# Patient Record
Sex: MALE | Race: WHITE | Employment: OTHER | URBAN - METROPOLITAN AREA
[De-identification: names, ages, dates, MRNs, and addresses within clinical notes are randomized per-mention and may not be internally consistent; named-entity substitution may affect disease eponyms.]

---

## 2018-05-08 ENCOUNTER — HOSPITAL ENCOUNTER (OUTPATIENT)
Dept: CT IMAGING | Age: 68
Discharge: HOME | End: 2018-05-08
Attending: INTERNAL MEDICINE
Payer: MEDICARE

## 2018-05-08 DIAGNOSIS — R04.2 HEMOPTYSIS: ICD-10-CM

## 2018-05-08 PROCEDURE — 74011000258 HC RX REV CODE- 258: Performed by: INTERNAL MEDICINE

## 2018-05-08 PROCEDURE — 74011636320 HC RX REV CODE- 636/320: Performed by: INTERNAL MEDICINE

## 2018-05-08 PROCEDURE — 71260 CT THORAX DX C+: CPT

## 2018-05-08 PROCEDURE — 82565 ASSAY OF CREATININE: CPT

## 2018-05-08 RX ORDER — SODIUM CHLORIDE 0.9 % (FLUSH) 0.9 %
10 SYRINGE (ML) INJECTION
Status: COMPLETED | OUTPATIENT
Start: 2018-05-08 | End: 2018-05-08

## 2018-05-08 RX ADMIN — Medication 10 ML: at 11:08

## 2018-05-08 RX ADMIN — IOPAMIDOL 100 ML: 612 INJECTION, SOLUTION INTRAVENOUS at 11:08

## 2018-05-08 RX ADMIN — SODIUM CHLORIDE 100 ML: 900 INJECTION, SOLUTION INTRAVENOUS at 11:08

## 2018-05-09 LAB — CREAT BLD-MCNC: 1.2 MG/DL (ref 0.6–1.3)

## 2018-09-20 ENCOUNTER — ANESTHESIA EVENT (OUTPATIENT)
Dept: ENDOSCOPY | Age: 68
End: 2018-09-20
Payer: MEDICARE

## 2018-09-20 RX ORDER — PRAVASTATIN SODIUM 40 MG/1
40 TABLET ORAL DAILY
COMMUNITY

## 2018-09-20 RX ORDER — ASPIRIN 81 MG/1
81 TABLET ORAL DAILY
COMMUNITY

## 2018-09-20 RX ORDER — CARVEDILOL 6.25 MG/1
6.25 TABLET ORAL 2 TIMES DAILY
Status: ON HOLD | COMMUNITY
End: 2021-10-09 | Stop reason: SDUPTHER

## 2018-09-20 RX ORDER — CLOPIDOGREL BISULFATE 75 MG/1
75 TABLET ORAL DAILY
COMMUNITY

## 2018-09-20 RX ORDER — FUROSEMIDE 80 MG/1
80 TABLET ORAL DAILY
Status: ON HOLD | COMMUNITY
End: 2021-08-24 | Stop reason: SDUPTHER

## 2018-09-20 RX ORDER — GABAPENTIN 300 MG/1
600 CAPSULE ORAL 2 TIMES DAILY
COMMUNITY

## 2018-09-20 RX ORDER — FLUTICASONE PROPIONATE AND SALMETEROL 250; 50 UG/1; UG/1
1 POWDER RESPIRATORY (INHALATION) 2 TIMES DAILY
COMMUNITY

## 2018-09-20 NOTE — ANESTHESIA PREPROCEDURE EVALUATION
Anesthetic History No history of anesthetic complications Review of Systems / Medical History Patient summary reviewed, nursing notes reviewed and pertinent labs reviewed Pulmonary COPD: mild Sleep apnea: CPAP Neuro/Psych Within defined limits Cardiovascular Hypertension: well controlled CHF Dysrhythmias : atrial fibrillation CAD 
 
 
  
GI/Hepatic/Renal 
  
GERD Endo/Other Obesity and cancer Other Findings Physical Exam 
 
Airway Mallampati: III 
TM Distance: > 6 cm Neck ROM: normal range of motion Mouth opening: Normal 
 
 Cardiovascular Regular rate and rhythm,  S1 and S2 normal,  no murmur, click, rub, or gallop Dental 
 
Dentition: Poor dentition Pulmonary Breath sounds clear to auscultation Abdominal 
GI exam deferred Other Findings Anesthetic Plan ASA: 3 Anesthesia type: MAC Induction: Intravenous Anesthetic plan and risks discussed with: Patient

## 2018-09-21 ENCOUNTER — APPOINTMENT (OUTPATIENT)
Dept: GENERAL RADIOLOGY | Age: 68
End: 2018-09-21
Attending: COLON & RECTAL SURGERY
Payer: MEDICARE

## 2018-09-21 ENCOUNTER — ANESTHESIA (OUTPATIENT)
Dept: ENDOSCOPY | Age: 68
End: 2018-09-21
Payer: MEDICARE

## 2018-09-21 ENCOUNTER — HOSPITAL ENCOUNTER (OUTPATIENT)
Age: 68
Setting detail: OUTPATIENT SURGERY
Discharge: HOME | End: 2018-09-21
Attending: COLON & RECTAL SURGERY | Admitting: COLON & RECTAL SURGERY
Payer: MEDICARE

## 2018-09-21 VITALS
RESPIRATION RATE: 15 BRPM | WEIGHT: 216 LBS | TEMPERATURE: 97.8 F | BODY MASS INDEX: 31.9 KG/M2 | HEART RATE: 70 BPM | DIASTOLIC BLOOD PRESSURE: 80 MMHG | SYSTOLIC BLOOD PRESSURE: 139 MMHG | OXYGEN SATURATION: 98 %

## 2018-09-21 PROCEDURE — 74270 X-RAY XM COLON 1CNTRST STD: CPT

## 2018-09-21 PROCEDURE — 76040000019: Performed by: COLON & RECTAL SURGERY

## 2018-09-21 PROCEDURE — 76060000031 HC ANESTHESIA FIRST 0.5 HR: Performed by: COLON & RECTAL SURGERY

## 2018-09-21 PROCEDURE — 77030027957 HC TBNG IRR ENDOGTR BUSS -B: Performed by: COLON & RECTAL SURGERY

## 2018-09-21 PROCEDURE — 74011636320 HC RX REV CODE- 636/320: Performed by: RADIOLOGY

## 2018-09-21 RX ORDER — SODIUM CHLORIDE 9 MG/ML
INJECTION, SOLUTION INTRAVENOUS
Status: DISCONTINUED | OUTPATIENT
Start: 2018-09-21 | End: 2018-09-21 | Stop reason: HOSPADM

## 2018-09-21 RX ORDER — SODIUM CHLORIDE 0.9 % (FLUSH) 0.9 %
5-10 SYRINGE (ML) INJECTION EVERY 8 HOURS
Status: DISCONTINUED | OUTPATIENT
Start: 2018-09-21 | End: 2018-09-21 | Stop reason: HOSPADM

## 2018-09-21 RX ORDER — NALOXONE HYDROCHLORIDE 0.4 MG/ML
0.4 INJECTION, SOLUTION INTRAMUSCULAR; INTRAVENOUS; SUBCUTANEOUS
Status: DISCONTINUED | OUTPATIENT
Start: 2018-09-21 | End: 2018-09-21 | Stop reason: HOSPADM

## 2018-09-21 RX ORDER — ATROPINE SULFATE 0.1 MG/ML
0.5 INJECTION INTRAVENOUS
Status: DISCONTINUED | OUTPATIENT
Start: 2018-09-21 | End: 2018-09-21 | Stop reason: HOSPADM

## 2018-09-21 RX ORDER — FLUMAZENIL 0.1 MG/ML
0.2 INJECTION INTRAVENOUS
Status: DISCONTINUED | OUTPATIENT
Start: 2018-09-21 | End: 2018-09-21 | Stop reason: HOSPADM

## 2018-09-21 RX ORDER — PROPOFOL 10 MG/ML
INJECTION, EMULSION INTRAVENOUS AS NEEDED
Status: DISCONTINUED | OUTPATIENT
Start: 2018-09-21 | End: 2018-09-21 | Stop reason: HOSPADM

## 2018-09-21 RX ORDER — SODIUM CHLORIDE 0.9 % (FLUSH) 0.9 %
5-10 SYRINGE (ML) INJECTION AS NEEDED
Status: DISCONTINUED | OUTPATIENT
Start: 2018-09-21 | End: 2018-09-21 | Stop reason: HOSPADM

## 2018-09-21 RX ORDER — EPINEPHRINE 0.1 MG/ML
1 INJECTION INTRACARDIAC; INTRAVENOUS
Status: DISCONTINUED | OUTPATIENT
Start: 2018-09-21 | End: 2018-09-21 | Stop reason: HOSPADM

## 2018-09-21 RX ORDER — LIDOCAINE HYDROCHLORIDE 20 MG/ML
INJECTION, SOLUTION EPIDURAL; INFILTRATION; INTRACAUDAL; PERINEURAL AS NEEDED
Status: DISCONTINUED | OUTPATIENT
Start: 2018-09-21 | End: 2018-09-21 | Stop reason: HOSPADM

## 2018-09-21 RX ORDER — DEXTROMETHORPHAN/PSEUDOEPHED 2.5-7.5/.8
1.2 DROPS ORAL
Status: DISCONTINUED | OUTPATIENT
Start: 2018-09-21 | End: 2018-09-21 | Stop reason: HOSPADM

## 2018-09-21 RX ORDER — SODIUM CHLORIDE 9 MG/ML
50 INJECTION, SOLUTION INTRAVENOUS CONTINUOUS
Status: DISCONTINUED | OUTPATIENT
Start: 2018-09-21 | End: 2018-09-21 | Stop reason: HOSPADM

## 2018-09-21 RX ADMIN — PROPOFOL 50 MG: 10 INJECTION, EMULSION INTRAVENOUS at 11:20

## 2018-09-21 RX ADMIN — LIDOCAINE HYDROCHLORIDE 40 MG: 20 INJECTION, SOLUTION EPIDURAL; INFILTRATION; INTRACAUDAL; PERINEURAL at 11:18

## 2018-09-21 RX ADMIN — PROPOFOL 50 MG: 10 INJECTION, EMULSION INTRAVENOUS at 11:18

## 2018-09-21 RX ADMIN — DIATRIZOATE MEGLUMINE AND DIATRIZOATE SODIUM 240 ML: 660; 100 LIQUID ORAL; RECTAL at 13:32

## 2018-09-21 RX ADMIN — SODIUM CHLORIDE: 9 INJECTION, SOLUTION INTRAVENOUS at 10:45

## 2018-09-21 RX ADMIN — PROPOFOL 50 MG: 10 INJECTION, EMULSION INTRAVENOUS at 11:19

## 2018-09-21 RX ADMIN — IOTHALAMATE MEGLUMINE 500 ML: 172 INJECTION URETERAL at 13:31

## 2018-09-21 RX ADMIN — PROPOFOL 50 MG: 10 INJECTION, EMULSION INTRAVENOUS at 11:22

## 2018-09-21 RX ADMIN — SODIUM CHLORIDE: 9 INJECTION, SOLUTION INTRAVENOUS at 11:22

## 2018-09-21 NOTE — BRIEF OP NOTE
BRIEF OPERATIVE NOTE    Date of Procedure: 9/21/2018   Preoperative Diagnosis: HISTORY OF POLYPS, ANAL SQUAMOUS CELL CARCINOMA  Postoperative Diagnosis: 1. incomplete, barium enema required    Procedure(s):  COLONOSCOPY  Surgeon(s) and Role:     * Paulo Hernandez MD - Primary         Surgical Assistant:     Surgical Staff:  Endoscopy Technician-1: Juan M Tomlinson  Endoscopy RN-1: Billie Madison RN  No case tracking events are documented in the log.   Anesthesia: MAC   Estimated Blood Loss: none  Specimens: * No specimens in log *   Findings: sigmoid stricture likely due to angulation/adhesions/scarring   Complications: none apparent  Implants: * No implants in log *

## 2018-09-21 NOTE — DISCHARGE INSTRUCTIONS
Kyler Buckner  [id number]  1950    COLON DISCHARGE INSTRUCTIONS  Discomfort:  Redness at IV site- apply warm compress to area; if redness or soreness persist- contact your physician  There may be a slight amount of blood passed from the rectum  Gaseous discomfort- walking, belching will help relieve any discomfort  You may not operate a vehicle for 12 hours  You may not engage in an occupation involving machinery or appliances for rest of today  You may not drink alcoholic beverages for at least 12 hours  Avoid making any critical decisions for at least 24 hour  DIET:   High fiber diet. - however -  remember your colon is empty and a heavy meal will produce gas. Avoid these foods:  vegetables, fried / greasy foods, carbonated drinks for today    MEDICATIONS:  resume       ACTIVITY:  You may resume your normal daily activities it is recommended that you spend the remainder of the day resting -  avoid any strenuous activity. CALL M.D. ANY SIGN OF:   Increasing pain, nausea, vomiting  Abdominal distension (swelling)  New increased bleeding (oral or rectal)  Fever (chills)  Pain in chest area  Bloody discharge from nose or mouth  Shortness of breath     Follow-up Instructions:   Call Ina Jimenez MD if any questions or problems. Telephone # [phone]  Biopsy results will be available in  7 to10 days  Should have a repeat colonoscopy in 5 years. COLONOSCOPY FINDINGS:  Your colonoscopy showed: sigmoid stricture making colonoscopy difficult.

## 2018-09-21 NOTE — PROGRESS NOTES

## 2018-09-21 NOTE — OP NOTES
Colonoscopy Procedure Note    Indications: Previous adenomatous polyp. Hx anal scc    Anesthesia/Sedation: MAC anesthesia Propofol    Pre-Procedure Exam:  Airway: clear   Heart: normal S1and S2    Lungs: clear bilateral  Abdomen: soft, nontender, bowel sounds present and normal in all quadrants   Mental Status: awake, alert, and oriented to person, place, and time      Procedure in Detail:  Informed consent was obtained for the procedure, including sedation. Risks of perforation, hemorrhage, adverse drug reaction, and aspiration were discussed. The patient was placed in the left lateral decubitus position. Based on the pre-procedure assessment, including review of the patient's medical history, medications, allergies, and review of systems, he had been deemed to be an appropriate candidate for moderate sedation; he was therefore sedated with the medications listed above. The patient was monitored continuously with ECG tracing, pulse oximetry, blood pressure monitoring, and direct observations. A rectal examination was performed. The [id number] was inserted into the rectum and advanced under direct vision to the sigmoid colon. The quality of the colonic preparation was excellent. A careful inspection was made as the colonoscope was withdrawn, including a retroflexed view of the rectum; findings and interventions are described below. Appropriate photodocumentation was obtained. Findings:   Rectum: Normal  Sigmoid:   - Excavated lesions:     - Diverticulosis   Stricture/scarring prevented passage of scope    Specimens: No specimens were collected. EBL: None    Complications: None; patient tolerated the procedure well. Attending Attestation: I performed the procedure. Recommendations:   - Repeat colonoscopy in 5 years.    Barium enema today    Signed By: Sunny Peralta MD                        September 21, 2018

## 2018-09-21 NOTE — IP AVS SNAPSHOT
[address] 
[phone] Patient: Yokasta Hou MRN: [id number] :1950 About your hospitalization You were admitted on:  2018 You last received care in the:  [facility] ENDOSCOPY You were discharged on:  2018 Why you were hospitalized Your primary diagnosis was:  Not on File Follow-up Information Follow up With Details Comments Contact Info Letha Boswell MD   [address] [place] [address] [address] [phone] Discharge Orders None A check alex indicates which time of day the medication should be taken. My Medications CONTINUE taking these medications Instructions Each Dose to Equal  
 Morning Noon Evening Bedtime ADVAIR DISKUS 250-50 mcg/dose diskus inhaler Generic drug:  fluticasone-salmeterol Your last dose was: Your next dose is: Take 1 Puff by inhalation two (2) times a day. 1 Puff ASPIRIN PO Your last dose was: Your next dose is: Take 81 mg by mouth daily. 81 mg  
    
   
   
   
  
 carvedilol 6.25 mg tablet Commonly known as:  Laura Olmosas Your last dose was: Your next dose is: Take 6.25 mg by mouth two (2) times a day. 6.25 mg  
    
   
   
   
  
 digoxin 0.125 mg tablet Commonly known as:  LANOXIN Your last dose was: Your next dose is: Take 0.125 mg by mouth daily. 0.125 mg  
    
   
   
   
  
 folic acid 1 mg tablet Commonly known as:  Robles Sender Your last dose was: Your next dose is: Take 1 Tab by mouth daily. 1 mg  
    
   
   
   
  
 furosemide 80 mg tablet Commonly known as:  LASIX Your last dose was: Your next dose is: Take 80 mg by mouth daily. 80 mg  
    
   
   
   
  
 gabapentin 300 mg capsule Commonly known as:  NEURONTIN Your last dose was: Your next dose is: Take 600 mg by mouth two (2) times a day. 600 mg PLAVIX 75 mg Tab Generic drug:  clopidogrel Your last dose was: Your next dose is: Take 75 mg by mouth daily. 75 mg  
    
   
   
   
  
 potassium chloride SR 10 mEq tablet Commonly known as:  KLOR-CON 10 Your last dose was: Your next dose is: Take 2 Tabs by mouth two (2) times a day. 20 mEq  
    
   
   
   
  
 pravastatin 40 mg tablet Commonly known as:  PRAVACHOL Your last dose was: Your next dose is: Take 40 mg by mouth daily. 40 mg  
    
   
   
   
  
 SPIRIVA WITH HANDIHALER 18 mcg inhalation capsule Generic drug:  tiotropium Your last dose was: Your next dose is: Take 1 Cap by inhalation daily. 1 Cap Discharge Instructions Francisco Javier Sharma [id number] 
1950 COLON DISCHARGE INSTRUCTIONS Discomfort: 
Redness at IV site- apply warm compress to area; if redness or soreness persist- contact your physician There may be a slight amount of blood passed from the rectum Gaseous discomfort- walking, belching will help relieve any discomfort You may not operate a vehicle for 12 hours You may not engage in an occupation involving machinery or appliances for rest of today You may not drink alcoholic beverages for at least 12 hours Avoid making any critical decisions for at least 24 hour DIET: 
 High fiber diet.  however -  remember your colon is empty and a heavy meal will produce gas. Avoid these foods:  vegetables, fried / greasy foods, carbonated drinks for today MEDICATIONS: 
resume ACTIVITY: 
You may resume your normal daily activities it is recommended that you spend the remainder of the day resting -  avoid any strenuous activity. CALL M.D. ANY SIGN OF: Increasing pain, nausea, vomiting Abdominal distension (swelling) New increased bleeding (oral or rectal) Fever (chills) Pain in chest area Bloody discharge from nose or mouth Shortness of breath Follow-up Instructions: 
 Call Naa Fink MD if any questions or problems. Telephone # [phone] Biopsy results will be available in  7 to10 days Should have a repeat colonoscopy in 5 years. COLONOSCOPY FINDINGS: 
Your colonoscopy showed: sigmoid stricture making colonoscopy difficult. Introducing [facility]! Cindy Tsang introduces [employer] patient portal. Now you can access parts of your medical record, email your doctor's office, and request medication refills online. 1. In your internet browser, go to [email or web address] 2. Click on the First Time User? Click Here link in the Sign In box. You will see the New Member Sign Up page. 3. Enter your [employer] Access Code exactly as it appears below. You will not need to use this code after youve completed the sign-up process. If you do not sign up before the expiration date, you must request a new code. · [employer] Access Code: [email or web address] Expires: 11/4/2018  1:48 PM 
 
4. Enter the last four digits of your Social Security Number (xxxx) and Date of Birth (mm/dd/yyyy) as indicated and click Submit. You will be taken to the next sign-up page. 5. Create a [employer] ID. This will be your [employer] login ID and cannot be changed, so think of one that is secure and easy to remember. 6. Create a [employer] password. You can change your password at any time. 7. Enter your Password Reset Question and Answer. This can be used at a later time if you forget your password. 8. Enter your e-mail address. You will receive e-mail notification when new information is available in [address] 9. Click Sign Up. You can now view and download portions of your medical record. 10. Click the Download Summary menu link to download a portable copy of your medical information. If you have questions, please visit the Frequently Asked Questions section of the [email or web address] website. Remember, [employer] is NOT to be used for urgent needs. For medical emergencies, dial 911. Now available from your iPhone and Android! Introducing Angel Rodriguez As a [facility] patient, I wanted to make you aware of our electronic visit tool called Angel Rodriguez. [employer] allows you to connect within minutes with a medical provider 24 hours a day, seven days a week via a mobile device or tablet or logging into a secure website from your computer. You can access Angel Rodriguez from anywhere in the [place]. A virtual visit might be right for you when you have a simple condition and feel like you just dont want to get out of bed, or cant get away from work for an appointment, when your regular [facility] provider is not available (evenings, weekends or holidays), or when youre out of town and need minor care. Electronic visits cost only $49 and if the [employer]Life[employer] provider determines a prescription is needed to treat your condition, one can be electronically transmitted to a nearby pharmacy*. Please take a moment to enroll today if you have not already done so. The enrollment process is free and takes just a few minutes. To enroll, please download the Bimb[employer] reza to your tablet or phone, or visit [email or web address] to enroll on your computer. And, as an [address] [place] [address] patient with a [employer] account, the results of your visits will be scanned into your electronic medical record and your primary care provider will be able to view the scanned results. We urge you to continue to see your regular [facility] provider for your ongoing medical care.   And while your primary care provider may not be the one available when you seek a Angel Rodriguez virtual visit, the peace of mind you get from getting a real diagnosis real time can be priceless. For more information on Angel Rodriguez, view our Frequently Asked Questions (FAQs) at [email or web address]. Sincerely, 
 
Cass Thomas MD 
Chief Medical Officer Odette Luz *:  certain medications cannot be prescribed via Angel Rodriguez Providers Seen During Your Hospitalization Provider Specialty Primary office phone Socorro Alvarado MD Colon and Rectal Surgery [phone] Your Primary Care Physician (PCP) Primary Care Physician Office Phone Office Fax Nain Bojorquez, [address] [phone] You are allergic to the following No active allergies Recent Documentation Weight BMI Smoking Status 98 kg 31.9 kg/m2 Former Smoker Emergency Contacts Name Discharge Info Relation Home Work Mobile Louann Smith DISCHARGE CAREGIVER [3] Mother [14] [phone] AdamJanice louie  Child [2] [phone] Patient Belongings The following personal items are in your possession at time of discharge: 
  Dental Appliances: None  Visual Aid: None Please provide this summary of care documentation to your next provider. Signatures-by signing, you are acknowledging that this After Visit Summary has been reviewed with you and you have received a copy. Patient Signature:  ____________________________________________________________ Date:  ____________________________________________________________  
  
Vero Fenton Provider Signature:  ____________________________________________________________ Date:  ____________________________________________________________

## 2018-09-21 NOTE — H&P
Colon and Rectal Surgery History and Physical    Subjective:      Chely Sagastume is a 75 y.o. male who has hx anal scc and hx colon jm    Patient Active Problem List    Diagnosis Date Noted    PAD (peripheral artery disease) ([facility] 10/29/2014    Acute on chronic systolic heart failure ([address] 01/06/2014    Atrial flutter ([address] 10/30/2010    COPD (chronic obstructive pulmonary disease) ([address] 10/30/2010    Dilated cardiomyopathy ([address] 10/30/2010    ETOH abuse 10/30/2010     Past Medical History:   Diagnosis Date    Atrial fibrillation ([address]     CAD (coronary artery disease)     Cancer ([facility] [address]     Colon CA 2014 - polyp removed/chemo/radiation    Chronic obstructive pulmonary disease (HCC)     GERD (gastroesophageal reflux disease)     Heart failure (HCC)     Hypertension     Sleep apnea     uses CPAP      Past Surgical History:   Procedure Laterality Date    HX AFIB ABLATION      HX GI      cancerous polyps removed  - pt usnure if done in OR or during colonoscopy    HX GI      colonoscopy x 2 - polyps both times    HX ORTHOPAEDIC      killed nerves in feet    VASCULAR SURGERY PROCEDURE UNLIST      stents legs bilaterally 2014/2015      Social History   Substance Use Topics    Smoking status: Former Smoker     Quit date: 9/28/2012    Smokeless tobacco: Never Used    Alcohol use No      Family History   Problem Relation Age of Onset    Alcohol abuse Father     Cancer Father      lung    Cancer Brother      throat    Stroke Brother       Prior to Admission medications    Medication Sig Start Date End Date Taking? Authorizing Provider   tiotropium (SPIRIVA WITH HANDIHALER) 18 mcg inhalation capsule Take 1 Cap by inhalation daily. Yes Historical Provider   fluticasone-salmeterol (ADVAIR DISKUS) 250-50 mcg/dose diskus inhaler Take 1 Puff by inhalation two (2) times a day. Yes Historical Provider   gabapentin (NEURONTIN) 300 mg capsule Take 600 mg by mouth two (2) times a day.    Yes Historical Provider ASPIRIN PO Take 81 mg by mouth daily. Yes Historical Provider   pravastatin (PRAVACHOL) 40 mg tablet Take 40 mg by mouth daily. Yes Historical Provider   furosemide (LASIX) 80 mg tablet Take 80 mg by mouth daily. Yes Historical Provider   carvedilol (COREG) 6.25 mg tablet Take 6.25 mg by mouth two (2) times a day. Yes Historical Provider   digoxin (LANOXIN) 0.125 mg tablet Take 0.125 mg by mouth daily. Yes Historical Provider   folic acid (FOLVITE) 1 mg tablet Take 1 Tab by mouth daily. 1/24/14  Yes Blayne Padilla MD   potassium chloride SR (KLOR-CON 10) 10 mEq tablet Take 2 Tabs by mouth two (2) times a day. 1/24/14  Yes Blayne Padilla MD   clopidogrel (PLAVIX) 75 mg tab Take 75 mg by mouth daily. Historical Provider     No Known Allergies     Review of Systems:    A comprehensive review of systems was negative except for that written in the History of Present Illness. Objective:     Visit Vitals    /54    Pulse 66    Temp 97.7 °F (36.5 °C)    Resp (!) 7    Wt 98 kg (216 lb)    SpO2 94%    BMI 31.9 kg/m2        Physical Exam:   nad  Chest clear  Heart reg  abd soft    Imaging:  images and reports reviewed    Lab Review:  No results found for this or any previous visit (from the past 24 hour(s)). Labs and radiology: images and reports reviewed      Assessment:   Anal scc and hx jm    Plan:     1. I recommend proceeding with colonoscopy. Treatment alternatives were discussed. 2. Discussed aspects of surgical intervention, methods, risks (including by not limited to infection, bleeding, hematoma, and perforation of the intestines or solid organs), and the risks of general anesthetic. The patient understands the risks; any and all questions were answered to the patient's satisfaction.     Signed By: Calvin Camargo MD     September 21, 2018

## 2018-09-21 NOTE — PROGRESS NOTES
Ana Goode  1950  [id number]    Situation:  Verbal report received from: Etiennejulianne Sanchez  Procedure: Procedure(s):  COLONOSCOPY    Background:    Preoperative diagnosis: HISTORY OF POLYPS, ANAL SQUAMOUS CELL CARCINOMA  Postoperative diagnosis: 1. incomplete, barium enema required    :  Dr. Shahram Salmeron  Assistant(s): Endoscopy Technician-1: Maria Alejandra Molina  Endoscopy RN-1: Tricia Dugan RN    Specimens: * No specimens in log *  H. Pylori  no    Assessment:  Intra-procedure medications     Anesthesia gave intra-procedure sedation and medications, see anesthesia flow sheet yes    Intravenous fluids: NS@ KVO     Vital signs stable     Abdominal assessment: round and soft     Recommendation:  Discharge patient per MD order  . Return to floor  Family or Friend   MD ordered [facility] radiology notified. Will pick patient up and transport to unit. Vital signs remain stable denies pain awaiting . Procedure explained to patient.

## 2019-03-01 ENCOUNTER — HOSPITAL ENCOUNTER (OUTPATIENT)
Dept: CARDIAC REHAB | Age: 69
End: 2019-03-01

## 2019-03-28 ENCOUNTER — HOSPITAL ENCOUNTER (OUTPATIENT)
Dept: CARDIAC REHAB | Age: 69
Discharge: HOME | End: 2019-03-28
Payer: MEDICARE

## 2019-03-28 VITALS — HEIGHT: 69 IN | WEIGHT: 232.4 LBS | BODY MASS INDEX: 34.42 KG/M2

## 2019-03-28 PROCEDURE — G0424 PULMONARY REHAB W EXER: HCPCS

## 2019-03-28 NOTE — CARDIO/PULMONARY
Cardiopulmonary Rehab Intake Note: 
Met with Mr. Jonna Godwin for initial pulmonary visit. Mr. Marsha Neville is a 70year old  patient of Dr. James Zapata who presents with COPD. History of A-fib/Flutter, cardiomyopathy, heart failure, PAD, alcohol abuse and rectal cancer. [id number]% 
  
Limitations to exercise are primarily related to deconditioning  
   
He currently is not exercising at home.   
   
He competed the 6 minute walk test on the track, walking 243 meters, mets 2.2, RPE 12 and RPD 3.  He did have some shortness of breath during his 6 min walk test but did not require oxygen. 
   
Psychosocial: Pt scored 1 on his PHQ9. He is retired and has two children that live near him. 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 He used to sing and play guitar but can not sing due to his breathing. He also can not play guitar due to arthritis in his hands. 
   
Patient was given an overview of cardiac rehab program, placement of electrode/leads, and rationale for program. 
   
Heart rhythm on the monitor was A fib. Oxygen saturation was 97% on room air. BMI 34.3 Patient's identified goals are 1. To lose 15 lbs 2. To gain strength in his legs.

## 2019-03-28 NOTE — CARDIO/PULMONARY
COPD Assessment Tool (CAT)     0-5 I never cough/I cough all the time       Score:2 I have no phlegm in my chest/ My chest is completely full of phlegm  Score:2 My chest does not feel tight at all/ My chest feels very tight   Score:0 When I walk up a hill or stairs I am bot breathless/ When I walk up a hill or stairs I am very breathless    Score3: 
 
 
I am not limited doing any activities at home/ 
 I am very limited doing activities at home     Score:1 I am confident leaving my home despite my lung condition/ 
I am not at all confident leaving my home because of my lung condition Score:0 I sleep soundly/ I don't sleep because of my lung condition   Score:1 I have lots of energy/ I have no energy at all     Score:3            Total:12

## 2019-04-02 ENCOUNTER — HOSPITAL ENCOUNTER (OUTPATIENT)
Dept: CARDIAC REHAB | Age: 69
Discharge: HOME | End: 2019-04-02
Payer: MEDICARE

## 2019-04-02 VITALS — WEIGHT: 231 LBS | BODY MASS INDEX: 34.11 KG/M2

## 2019-04-02 PROCEDURE — 93798 PHYS/QHP OP CAR RHAB W/ECG: CPT

## 2019-04-02 PROCEDURE — G0424 PULMONARY REHAB W EXER: HCPCS

## 2019-04-04 ENCOUNTER — HOSPITAL ENCOUNTER (OUTPATIENT)
Dept: CARDIAC REHAB | Age: 69
Discharge: HOME | End: 2019-04-04
Payer: MEDICARE

## 2019-04-04 VITALS — BODY MASS INDEX: 34.41 KG/M2 | WEIGHT: 233 LBS

## 2019-04-04 PROCEDURE — G0424 PULMONARY REHAB W EXER: HCPCS

## 2019-04-09 ENCOUNTER — HOSPITAL ENCOUNTER (OUTPATIENT)
Dept: CARDIAC REHAB | Age: 69
Discharge: HOME | End: 2019-04-09
Payer: MEDICARE

## 2019-04-09 VITALS — BODY MASS INDEX: 34.26 KG/M2 | WEIGHT: 232 LBS

## 2019-04-09 PROCEDURE — G0424 PULMONARY REHAB W EXER: HCPCS

## 2019-04-11 ENCOUNTER — HOSPITAL ENCOUNTER (OUTPATIENT)
Dept: CARDIAC REHAB | Age: 69
Discharge: HOME | End: 2019-04-11
Payer: MEDICARE

## 2019-04-11 VITALS — BODY MASS INDEX: 34.41 KG/M2 | WEIGHT: 233 LBS

## 2019-04-11 PROCEDURE — G0424 PULMONARY REHAB W EXER: HCPCS

## 2019-04-23 ENCOUNTER — HOSPITAL ENCOUNTER (OUTPATIENT)
Dept: CARDIAC REHAB | Age: 69
Discharge: HOME | End: 2019-04-23
Payer: MEDICARE

## 2019-04-23 VITALS — WEIGHT: 224 LBS | BODY MASS INDEX: 33.08 KG/M2

## 2019-04-23 PROCEDURE — G0424 PULMONARY REHAB W EXER: HCPCS

## 2019-04-25 ENCOUNTER — HOSPITAL ENCOUNTER (OUTPATIENT)
Dept: CARDIAC REHAB | Age: 69
Discharge: HOME | End: 2019-04-25
Payer: MEDICARE

## 2019-04-25 VITALS — BODY MASS INDEX: 33.52 KG/M2 | WEIGHT: 227 LBS

## 2019-04-25 PROCEDURE — G0424 PULMONARY REHAB W EXER: HCPCS

## 2019-05-02 ENCOUNTER — HOSPITAL ENCOUNTER (OUTPATIENT)
Dept: CARDIAC REHAB | Age: 69
Discharge: HOME | End: 2019-05-02
Payer: MEDICARE

## 2019-05-02 VITALS — WEIGHT: 224 LBS | BODY MASS INDEX: 33.08 KG/M2

## 2019-05-02 PROCEDURE — G0424 PULMONARY REHAB W EXER: HCPCS

## 2019-05-07 ENCOUNTER — HOSPITAL ENCOUNTER (OUTPATIENT)
Dept: CARDIAC REHAB | Age: 69
Discharge: HOME | End: 2019-05-07
Payer: MEDICARE

## 2019-05-07 VITALS — BODY MASS INDEX: 33.52 KG/M2 | WEIGHT: 227 LBS

## 2019-05-07 PROCEDURE — G0424 PULMONARY REHAB W EXER: HCPCS

## 2019-09-11 ENCOUNTER — HOSPITAL ENCOUNTER (OUTPATIENT)
Dept: GENERAL RADIOLOGY | Age: 69
Discharge: HOME | End: 2019-09-11
Payer: MEDICARE

## 2019-09-11 DIAGNOSIS — C02.2 MALIGNANT NEOPLASM OF VENTRAL SURFACE OF TONGUE (HCC): ICD-10-CM

## 2019-09-11 DIAGNOSIS — I48.91 ATRIAL FIBRILLATION (HCC): ICD-10-CM

## 2019-09-11 DIAGNOSIS — Z00.8 OTHER SPECIFIED GENERAL MEDICAL EXAMINATION: ICD-10-CM

## 2019-09-11 DIAGNOSIS — I73.9 PERIPHERAL VASCULAR DISEASE (HCC): ICD-10-CM

## 2019-09-11 DIAGNOSIS — Z78.9 NON-SMOKER: ICD-10-CM

## 2019-09-11 PROCEDURE — 71046 X-RAY EXAM CHEST 2 VIEWS: CPT

## 2019-09-13 ENCOUNTER — HOSPITAL ENCOUNTER (OUTPATIENT)
Dept: CT IMAGING | Age: 69
Discharge: HOME | End: 2019-09-13
Attending: OTOLARYNGOLOGY
Payer: MEDICARE

## 2019-09-13 DIAGNOSIS — I73.9 PERIPHERAL VASCULAR DISEASE (HCC): ICD-10-CM

## 2019-09-13 DIAGNOSIS — C02.2 MALIGNANT NEOPLASM OF VENTRAL SURFACE OF TONGUE (HCC): ICD-10-CM

## 2019-09-13 DIAGNOSIS — Z78.9 NON-SMOKER: ICD-10-CM

## 2019-09-13 DIAGNOSIS — I48.91 ATRIAL FIBRILLATION (HCC): ICD-10-CM

## 2019-09-13 DIAGNOSIS — Z00.8 OTHER SPECIFIED GENERAL MEDICAL EXAMINATION: ICD-10-CM

## 2019-09-13 LAB — CREAT BLD-MCNC: 1.1 MG/DL (ref 0.6–1.3)

## 2019-09-13 PROCEDURE — 82565 ASSAY OF CREATININE: CPT

## 2019-09-13 PROCEDURE — 74011636320 HC RX REV CODE- 636/320: Performed by: OTOLARYNGOLOGY

## 2019-09-13 PROCEDURE — 70491 CT SOFT TISSUE NECK W/DYE: CPT

## 2019-09-13 RX ORDER — SODIUM CHLORIDE 0.9 % (FLUSH) 0.9 %
10 SYRINGE (ML) INJECTION
Status: COMPLETED | OUTPATIENT
Start: 2019-09-13 | End: 2019-09-13

## 2019-09-13 RX ADMIN — Medication 10 ML: at 13:30

## 2019-09-13 RX ADMIN — IOPAMIDOL 100 ML: 755 INJECTION, SOLUTION INTRAVENOUS at 13:30

## 2019-12-10 ENCOUNTER — HOSPITAL ENCOUNTER (OUTPATIENT)
Dept: LAB | Age: 69
Discharge: HOME | End: 2019-12-10

## 2020-01-03 ENCOUNTER — ANESTHESIA EVENT (OUTPATIENT)
Dept: ENDOSCOPY | Age: 70
End: 2020-01-03
Payer: MEDICARE

## 2020-01-03 ENCOUNTER — ANESTHESIA (OUTPATIENT)
Dept: ENDOSCOPY | Age: 70
End: 2020-01-03
Payer: MEDICARE

## 2020-01-03 ENCOUNTER — HOSPITAL ENCOUNTER (OUTPATIENT)
Age: 70
Setting detail: OUTPATIENT SURGERY
Discharge: HOME | End: 2020-01-03
Attending: COLON & RECTAL SURGERY | Admitting: COLON & RECTAL SURGERY
Payer: MEDICARE

## 2020-01-03 VITALS
SYSTOLIC BLOOD PRESSURE: 146 MMHG | TEMPERATURE: 97.7 F | HEART RATE: 73 BPM | OXYGEN SATURATION: 96 % | DIASTOLIC BLOOD PRESSURE: 94 MMHG

## 2020-01-03 PROCEDURE — 77030037041 HC FCPS HOT BIOP ENDOSC RAD JAW DISP BSC -B: Performed by: COLON & RECTAL SURGERY

## 2020-01-03 PROCEDURE — 74011250636 HC RX REV CODE- 250/636: Performed by: NURSE ANESTHETIST, CERTIFIED REGISTERED

## 2020-01-03 PROCEDURE — 76040000019: Performed by: COLON & RECTAL SURGERY

## 2020-01-03 PROCEDURE — 88305 TISSUE EXAM BY PATHOLOGIST: CPT

## 2020-01-03 PROCEDURE — 76060000031 HC ANESTHESIA FIRST 0.5 HR: Performed by: COLON & RECTAL SURGERY

## 2020-01-03 PROCEDURE — 74011000250 HC RX REV CODE- 250: Performed by: NURSE ANESTHETIST, CERTIFIED REGISTERED

## 2020-01-03 RX ORDER — IBUPROFEN 200 MG
TABLET ORAL
COMMUNITY
End: 2021-08-19

## 2020-01-03 RX ORDER — SODIUM CHLORIDE 9 MG/ML
50 INJECTION, SOLUTION INTRAVENOUS CONTINUOUS
Status: DISCONTINUED | OUTPATIENT
Start: 2020-01-03 | End: 2020-01-03 | Stop reason: HOSPADM

## 2020-01-03 RX ORDER — LIDOCAINE HYDROCHLORIDE 20 MG/ML
INJECTION, SOLUTION EPIDURAL; INFILTRATION; INTRACAUDAL; PERINEURAL AS NEEDED
Status: DISCONTINUED | OUTPATIENT
Start: 2020-01-03 | End: 2020-01-03 | Stop reason: HOSPADM

## 2020-01-03 RX ORDER — PROPOFOL 10 MG/ML
INJECTION, EMULSION INTRAVENOUS AS NEEDED
Status: DISCONTINUED | OUTPATIENT
Start: 2020-01-03 | End: 2020-01-03 | Stop reason: HOSPADM

## 2020-01-03 RX ORDER — NALOXONE HYDROCHLORIDE 0.4 MG/ML
0.4 INJECTION, SOLUTION INTRAMUSCULAR; INTRAVENOUS; SUBCUTANEOUS
Status: DISCONTINUED | OUTPATIENT
Start: 2020-01-03 | End: 2020-01-03 | Stop reason: HOSPADM

## 2020-01-03 RX ORDER — SODIUM CHLORIDE 9 MG/ML
INJECTION, SOLUTION INTRAVENOUS
Status: DISCONTINUED | OUTPATIENT
Start: 2020-01-03 | End: 2020-01-03 | Stop reason: HOSPADM

## 2020-01-03 RX ORDER — SODIUM CHLORIDE 0.9 % (FLUSH) 0.9 %
5-40 SYRINGE (ML) INJECTION EVERY 8 HOURS
Status: DISCONTINUED | OUTPATIENT
Start: 2020-01-03 | End: 2020-01-03 | Stop reason: HOSPADM

## 2020-01-03 RX ORDER — FLUMAZENIL 0.1 MG/ML
0.2 INJECTION INTRAVENOUS
Status: DISCONTINUED | OUTPATIENT
Start: 2020-01-03 | End: 2020-01-03 | Stop reason: HOSPADM

## 2020-01-03 RX ORDER — DEXTROMETHORPHAN/PSEUDOEPHED 2.5-7.5/.8
1.2 DROPS ORAL
Status: DISCONTINUED | OUTPATIENT
Start: 2020-01-03 | End: 2020-01-03 | Stop reason: HOSPADM

## 2020-01-03 RX ORDER — ATROPINE SULFATE 0.1 MG/ML
0.5 INJECTION INTRAVENOUS
Status: DISCONTINUED | OUTPATIENT
Start: 2020-01-03 | End: 2020-01-03 | Stop reason: HOSPADM

## 2020-01-03 RX ORDER — EPINEPHRINE 0.1 MG/ML
1 INJECTION INTRACARDIAC; INTRAVENOUS
Status: DISCONTINUED | OUTPATIENT
Start: 2020-01-03 | End: 2020-01-03 | Stop reason: HOSPADM

## 2020-01-03 RX ORDER — SODIUM CHLORIDE 0.9 % (FLUSH) 0.9 %
5-40 SYRINGE (ML) INJECTION AS NEEDED
Status: DISCONTINUED | OUTPATIENT
Start: 2020-01-03 | End: 2020-01-03 | Stop reason: HOSPADM

## 2020-01-03 RX ADMIN — PROPOFOL 25 MG: 10 INJECTION, EMULSION INTRAVENOUS at 12:21

## 2020-01-03 RX ADMIN — SODIUM CHLORIDE: 900 INJECTION, SOLUTION INTRAVENOUS at 12:05

## 2020-01-03 RX ADMIN — PROPOFOL 25 MG: 10 INJECTION, EMULSION INTRAVENOUS at 12:15

## 2020-01-03 RX ADMIN — PROPOFOL 25 MG: 10 INJECTION, EMULSION INTRAVENOUS at 12:14

## 2020-01-03 RX ADMIN — PROPOFOL 25 MG: 10 INJECTION, EMULSION INTRAVENOUS at 12:22

## 2020-01-03 RX ADMIN — PROPOFOL 20 MG: 10 INJECTION, EMULSION INTRAVENOUS at 12:10

## 2020-01-03 RX ADMIN — PROPOFOL 25 MG: 10 INJECTION, EMULSION INTRAVENOUS at 12:19

## 2020-01-03 RX ADMIN — PROPOFOL 25 MG: 10 INJECTION, EMULSION INTRAVENOUS at 12:11

## 2020-01-03 RX ADMIN — LIDOCAINE HYDROCHLORIDE 100 MG: 20 INJECTION, SOLUTION EPIDURAL; INFILTRATION; INTRACAUDAL; PERINEURAL at 12:09

## 2020-01-03 RX ADMIN — PROPOFOL 30 MG: 10 INJECTION, EMULSION INTRAVENOUS at 12:09

## 2020-01-03 RX ADMIN — PROPOFOL 25 MG: 10 INJECTION, EMULSION INTRAVENOUS at 12:17

## 2020-01-03 NOTE — OP NOTES
Colonoscopy Procedure Note    Indications: Rectal bleeding    Anesthesia/Sedation: MAC anesthesia Propofol    Pre-Procedure Exam:  Airway: clear   Heart: normal S1and S2    Lungs: clear bilateral  Abdomen: soft, nontender, bowel sounds present and normal in all quadrants   Mental Status: awake, alert, and oriented to person, place, and time      Procedure in Detail:  Informed consent was obtained for the procedure, including sedation. Risks of perforation, hemorrhage, adverse drug reaction, and aspiration were discussed. The patient was placed in the left lateral decubitus position. Based on the pre-procedure assessment, including review of the patient's medical history, medications, allergies, and review of systems, he had been deemed to be an appropriate candidate for moderate sedation; he was therefore sedated with the medications listed above. The patient was monitored continuously with ECG tracing, pulse oximetry, blood pressure monitoring, and direct observations. A rectal examination was performed. The [id number] was inserted into the rectum and advanced under direct vision to the cecum, which was identified by the ileocecal valve and appendiceal orifice. The quality of the colonic preparation was excellent. A careful inspection was made as the colonoscope was withdrawn, including a retroflexed view of the rectum; findings and interventions are described below. Appropriate photodocumentation was obtained. 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        EBL: Minimal    Complications: None; patient tolerated the procedure well.    Attending Attestation: I performed the procedure.     Recommendations:    - repeat colonoscopy in 1-2 months if he can get off of anticoagulation to remove the polyp

## 2020-01-03 NOTE — H&P
Colon and Rectal Surgery History and Physical    Subjective:      Yokasta Hou is a 78 y.o. male who has hx rectal bleeding    Patient Active Problem List    Diagnosis Date Noted    PAD (peripheral artery disease) ([facility] 10/29/2014    Acute on chronic systolic heart failure ([address] 2014    Atrial flutter ([address] 10/30/2010    COPD (chronic obstructive pulmonary disease) ([address] 10/30/2010    Dilated cardiomyopathy ([address] 10/30/2010    ETOH abuse 10/30/2010     Past Medical History:   Diagnosis Date    Atrial fibrillation ([address]     CAD (coronary artery disease)     Cancer ([facility]     Colon CA  - polyp removed/chemo/radiation    Chronic obstructive pulmonary disease (HCC)     GERD (gastroesophageal reflux disease)     Heart failure ([address]     Hypertension     Sleep apnea     uses CPAP      Past Surgical History:   Procedure Laterality Date    COLONOSCOPY N/A 2018    COL[facility] performed by Calvin [facility]argo [facility] at [facility] ENDOSCOPY    HX AFIB ABLATION      HX GI      cancerous polyps removed  - pt usnure if done in OR or during colonoscopy    HX GI      colonoscopy x 2 - polyps both times    HX ORTHOPAEDIC      killed nerves in feet    VASCULAR SURGERY PROCEDURE UNLIST      stents legs bilaterally       Social History     Tobacco Use    Smoking status: Former Smoker     Last attempt to quit: 3/1/2011     Years since quittin.8    Smokeless tobacco: Never Used   Substance Use Topics    Alcohol use: No      Family History   Problem Relation Age of Onset    Alcohol abuse Father     Cancer Father         lung    Cancer Brother         throat    Stroke Brother       Prior to Admission medications    Medication Sig Start Date End Date Taking? Authorizing Provider   ibuprofen (ADVIL) 200 mg tablet Take  by mouth. Yes Provider, Historical   tiotropium (SPIRIVA WITH HANDIHALER) 18 mcg inhalation capsule Take 1 Cap by inhalation daily.    Yes Provider, Historical   fluticasone-salmeterol (ADVAIR DISKUS) 250-50 mcg/dose diskus inhaler Take 1 Puff by inhalation two (2) times a day. Yes Provider, Historical   gabapentin (NEURONTIN) 300 mg capsule Take 600 mg by mouth two (2) times a day. Yes Provider, Historical   ASPIRIN PO Take 81 mg by mouth daily. Yes Provider, Historical   clopidogrel (PLAVIX) 75 mg tab Take 75 mg by mouth daily. Yes Provider, Historical   pravastatin (PRAVACHOL) 40 mg tablet Take 40 mg by mouth daily. Yes Provider, Historical   furosemide (LASIX) 80 mg tablet Take 80 mg by mouth daily. Yes Provider, Historical   carvedilol (COREG) 6.25 mg tablet Take 6.25 mg by mouth two (2) times a day. Yes Provider, Historical   digoxin (LANOXIN) 0.125 mg tablet Take 0.125 mg by mouth daily. Yes Provider, Historical   folic acid (FOLVITE) 1 mg tablet Take 1 Tab by mouth daily. 1/24/14  Yes Emilie Curiel MD   potassium chloride SR (KLOR-CON 10) 10 mEq tablet Take 2 Tabs by mouth two (2) times a day. 1/24/14  Yes Emilie Curiel MD     No Known Allergies     Review of Systems:    A comprehensive review of systems was negative except for that written in the History of Present Illness. Objective:     Visit Vitals  /59   Pulse 72   Resp 18   SpO2 93%        Physical Exam:   General:  Alert, cooperative, no distress, appears stated age. Head:  Normocephalic, without obvious abnormality, atraumatic. Eyes:  Conjunctivae/corneas clear. PERRL, EOMs intact. Ears:  Normal external ear canals both ears. Nose: Nares normal. Septum midline. No drainage. Throat: Lips, mucosa, and tongue normal. Teeth and gums normal.   Neck: Supple, symmetrical, trachea midline, no adenopathy   Back:   Symmetric, no curvature. ROM normal.   Lungs:   Clear   Chest wall:  No tenderness or deformity. Heart:  Regular rate and rhythm       Abdomen:   Soft, non-tender. Bowel sounds normal. No masses,  No organomegaly.    Genitalia:  deferred       Extremities: Extremities normal, atraumatic, no cyanosis or edema. Skin: Skin color, texture, turgor normal. No rashes or lesions. Neurologic: CNII-XII intact. Normal strength, sensation and reflexes throughout. Imaging:  images and reports reviewed    Lab Review:  No results found for this or any previous visit (from the past 24 hour(s)). Labs and radiology: images and reports reviewed      Assessment:     Rectal bleeding    Plan:     1. I recommend proceeding with colonoscopy. Treatment alternatives were discussed. 2. Discussed aspects of surgical intervention, methods, risks (including by not limited to infection, bleeding, hematoma, and perforation of the intestines or solid organs), and the risks of general anesthetic. The patient understands the risks; any and all questions were answered to the patient's satisfaction.     Signed By: Ina Jimenez MD     January 3, 2020

## 2020-01-03 NOTE — ROUTINE PROCESS
Benito Shin  1950  [id number]    Situation:  Verbal report received from: Mary Becerra RN  Procedure: Procedure(s):  COLONOSCOPY  COLON BIOPSY    Background:    Preoperative diagnosis: RADIATION PROCTITIS  Postoperative diagnosis: sigmoid diverticulosis, sigmoid polyp, not removed, radiation proctitis    :  Dr. Meagan Oliveros  Assistant(s): Endoscopy Technician-1: Shanna Yeboah  Endoscopy Technician-Relief: Reji ROWELL  Endoscopy RN-1: Abida Chavez RN    Specimens:   ID Type Source Tests Collected by Time Destination   1 : rectal bx, please R/O radiation proctitis Preservative   Stephie Jose MD 1/3/2020 1223 Pathology     H. Pylori  no    Assessment:  Intra-procedure medications   Anesthesia gave intra-procedure sedation and medications, see anesthesia flow sheet yes    Intravenous fluids: NS@ KVO     Vital signs stable     Abdominal assessment: round and soft     Recommendation:  Discharge patient per MD order.   Family or Friend   Permission to share finding with family or friend yes

## 2020-01-03 NOTE — ANESTHESIA POSTPROCEDURE EVALUATION
Procedure(s):  COLONOSCOPY  COLON BIOPSY. MAC    Anesthesia Post Evaluation      Multimodal analgesia: multimodal analgesia used between 6 hours prior to anesthesia start to PACU discharge  Patient location during evaluation: bedside  Patient participation: waiting for patient participation  Level of consciousness: awake  Pain management: adequate  Airway patency: patent  Anesthetic complications: no  Cardiovascular status: acceptable  Respiratory status: unassisted  Hydration status: acceptable  Comments: Post-Anesthesia Evaluation and Assessment    I have evaluated the patient and they are ready for PACU discharge. Patient: Cecille Martínez MRN: [id number]  SSN: [id number]   YOB: 1950  Age: 78 y.o. Sex: male      Cardiovascular Function/Vital Signs  BP (!) 146/94   Pulse 73   Temp 36.5 °C (97.7 °F)   Resp (!) 0   SpO2 96%     Patient is status post MAC anesthesia for Procedure(s):  COLONOSCOPY  COLON BIOPSY. Nausea/Vomiting: None    Postoperative hydration reviewed and adequate. Pain:  Pain Scale 1: Numeric (0 - 10) (01/03/20 1256)  Pain Intensity 1: 0 (01/03/20 1256)   Managed    Neurological Status: At baseline    Mental Status, Level of Consciousness: Alert and  oriented to person, place, and time    Pulmonary Status:   O2 Device: Room air (01/03/20 1256)   Adequate oxygenation and airway patent    Complications related to anesthesia: None    Post-anesthesia assessment completed. No concerns    Signed By: Alec Joyce MD    January 3, 2020                   Vitals Value Taken Time   BP 0/0 1/3/2020  1:08 PM   Temp 36.5 °C (97.7 °F) 1/3/2020 12:36 PM   Pulse 0 1/3/2020  1:08 PM   Resp 0 1/3/2020  1:09 PM   SpO2 89 % 1/3/2020  1:05 PM   Vitals shown include unvalidated device data.

## 2020-01-03 NOTE — PERIOP NOTES
Patient has been evaluated by anesthesia pre-procedure. Patient alert and oriented. Vital signs will not be charted by the Endoscopy nurse. All vitals, airway, and loc are monitored by anesthesia staff throughout procedure.        .Endoscope was pre-cleaned at bedside immediately following procedure by TOAN

## 2020-01-03 NOTE — DISCHARGE INSTRUCTIONS
Farhana Age  [id number]  1950    COLON DISCHARGE INSTRUCTIONS  Discomfort:  Redness at IV site- apply warm compress to area; if redness or soreness persist- contact your physician  There may be a slight amount of blood passed from the rectum  Gaseous discomfort- walking, belching will help relieve any discomfort  You may not operate a vehicle for 12 hours  You may not engage in an occupation involving machinery or appliances for rest of today  You may not drink alcoholic beverages for at least 12 hours  Avoid making any critical decisions for at least 24 hour  DIET:   High fiber diet. - however -  remember your colon is empty and a heavy meal will produce gas. Avoid these foods:  vegetables, fried / greasy foods, carbonated drinks for today    MEDICATIONS:  resume       ACTIVITY:  You may resume your normal daily activities it is recommended that you spend the remainder of the day resting -  avoid any strenuous activity. CALL M.D. ANY SIGN OF:   Increasing pain, nausea, vomiting  Abdominal distension (swelling)  New increased bleeding (oral or rectal)  Fever (chills)  Pain in chest area  Bloody discharge from nose or mouth  Shortness of breath     Follow-up Instructions:   Call Hang Henry MD if any questions or problems. Telephone # [phone]  Biopsy results will be available in  7 to10 days  Should have a repeat colonoscopy in 1-2 months if we can get you off of blood thinners to go remove the sigmoid polyp. COLONOSCOPY FINDINGS:  Your colonoscopy showed: sigmoid colon polyp, sigmoid diverticulosis, radiation proctitis.

## 2020-01-03 NOTE — BRIEF OP NOTE
BRIEF OPERATIVE NOTE    Date of Procedure: 1/3/2020   Preoperative Diagnosis: RADIATION PROCTITIS  Postoperative Diagnosis: sigmoid diverticulosis, sigmoid polyp, not removed, radiation proctitis    Procedure(s):  COLONOSCOPY  COLON BIOPSY  Surgeon(s) and Role:     * Stephie Jose MD - Primary         Surgical Assistant:     Surgical Staff:  Endoscopy Technician-1: Shanna Yeboah  Endoscopy Technician-Relief: Reji ROWELL  Endoscopy RN-1: Abida Chavez RN  No case tracking events are documented in the log. Anesthesia: MAC   Estimated Blood Loss: 1cc  Specimens:   ID Type Source Tests Collected by Time Destination   1 : rectal bx, please R/O radiation proctitis Preservative   Stephie Jose MD 1/3/2020 1223 Pathology      Findings: sigmoid diverticulosis, small sigmoid polyp, radiation proctitis.  No evidence of recurrent anal scc  Complications: none apparent  Implants: * No implants in log *

## 2020-01-03 NOTE — ANESTHESIA PREPROCEDURE EVALUATION
Relevant Problems   No relevant active problems       Anesthetic History   No history of anesthetic complications            Review of Systems / Medical History  Patient summary reviewed, nursing notes reviewed and pertinent labs reviewed    Pulmonary    COPD    Sleep apnea           Neuro/Psych   Within defined limits           Cardiovascular    Hypertension        Dysrhythmias   CAD         GI/Hepatic/Renal     GERD           Endo/Other  Within defined limits           Other Findings              Physical Exam    Airway  Mallampati: II  TM Distance: > 6 cm  Neck ROM: normal range of motion   Mouth opening: Normal     Cardiovascular  Regular rate and rhythm,  S1 and S2 normal,  no murmur, click, rub, or gallop             Dental    Dentition: Poor dentition     Pulmonary  Breath sounds clear to auscultation               Abdominal  GI exam deferred       Other Findings            Anesthetic Plan    ASA: 3  Anesthesia type: MAC            Anesthetic plan and risks discussed with: Patient

## 2020-01-16 ENCOUNTER — HOSPITAL ENCOUNTER (OUTPATIENT)
Dept: CT IMAGING | Age: 70
Discharge: HOME | End: 2020-01-16
Attending: OTOLARYNGOLOGY
Payer: MEDICARE

## 2020-01-16 DIAGNOSIS — Z78.9 NON-SMOKER: ICD-10-CM

## 2020-01-16 DIAGNOSIS — C02.2 MALIGNANT NEOPLASM OF VENTRAL SURFACE OF TONGUE (HCC): ICD-10-CM

## 2020-01-16 DIAGNOSIS — Z00.8 OTHER SPECIFIED GENERAL MEDICAL EXAMINATION: ICD-10-CM

## 2020-01-16 LAB — CREAT BLD-MCNC: 1.2 MG/DL (ref 0.6–1.3)

## 2020-01-16 PROCEDURE — 70491 CT SOFT TISSUE NECK W/DYE: CPT

## 2020-01-16 PROCEDURE — 82565 ASSAY OF CREATININE: CPT

## 2020-01-16 PROCEDURE — 74011000258 HC RX REV CODE- 258: Performed by: OTOLARYNGOLOGY

## 2020-01-16 PROCEDURE — 74011636320 HC RX REV CODE- 636/320: Performed by: OTOLARYNGOLOGY

## 2020-01-16 RX ORDER — SODIUM CHLORIDE 0.9 % (FLUSH) 0.9 %
10 SYRINGE (ML) INJECTION
Status: COMPLETED | OUTPATIENT
Start: 2020-01-16 | End: 2020-01-16

## 2020-01-16 RX ADMIN — Medication 10 ML: at 12:15

## 2020-01-16 RX ADMIN — SODIUM CHLORIDE 100 ML: 900 INJECTION, SOLUTION INTRAVENOUS at 12:15

## 2020-01-16 RX ADMIN — IOPAMIDOL 100 ML: 612 INJECTION, SOLUTION INTRAVENOUS at 12:15

## 2020-02-07 ENCOUNTER — ANESTHESIA (OUTPATIENT)
Dept: ENDOSCOPY | Age: 70
End: 2020-02-07
Payer: MEDICARE

## 2020-02-07 ENCOUNTER — ANESTHESIA EVENT (OUTPATIENT)
Dept: ENDOSCOPY | Age: 70
End: 2020-02-07
Payer: MEDICARE

## 2020-02-07 ENCOUNTER — HOSPITAL ENCOUNTER (OUTPATIENT)
Age: 70
Setting detail: OUTPATIENT SURGERY
Discharge: HOME | End: 2020-02-07
Attending: COLON & RECTAL SURGERY | Admitting: COLON & RECTAL SURGERY
Payer: MEDICARE

## 2020-02-07 VITALS
DIASTOLIC BLOOD PRESSURE: 54 MMHG | BODY MASS INDEX: 31.16 KG/M2 | HEART RATE: 66 BPM | TEMPERATURE: 97.8 F | OXYGEN SATURATION: 91 % | RESPIRATION RATE: 10 BRPM | SYSTOLIC BLOOD PRESSURE: 121 MMHG | WEIGHT: 211 LBS

## 2020-02-07 PROCEDURE — 88305 TISSUE EXAM BY PATHOLOGIST: CPT

## 2020-02-07 PROCEDURE — 76040000007: Performed by: COLON & RECTAL SURGERY

## 2020-02-07 PROCEDURE — 76060000032 HC ANESTHESIA 0.5 TO 1 HR: Performed by: COLON & RECTAL SURGERY

## 2020-02-07 PROCEDURE — 74011250636 HC RX REV CODE- 250/636: Performed by: NURSE ANESTHETIST, CERTIFIED REGISTERED

## 2020-02-07 PROCEDURE — 77030009426 HC FCPS BIOP ENDOSC BSC -B: Performed by: COLON & RECTAL SURGERY

## 2020-02-07 RX ORDER — PROPOFOL 10 MG/ML
INJECTION, EMULSION INTRAVENOUS AS NEEDED
Status: DISCONTINUED | OUTPATIENT
Start: 2020-02-07 | End: 2020-02-07 | Stop reason: HOSPADM

## 2020-02-07 RX ORDER — SODIUM CHLORIDE 9 MG/ML
50 INJECTION, SOLUTION INTRAVENOUS CONTINUOUS
Status: DISCONTINUED | OUTPATIENT
Start: 2020-02-07 | End: 2020-02-07 | Stop reason: HOSPADM

## 2020-02-07 RX ORDER — EPINEPHRINE 0.1 MG/ML
1 INJECTION INTRACARDIAC; INTRAVENOUS
Status: DISCONTINUED | OUTPATIENT
Start: 2020-02-07 | End: 2020-02-07 | Stop reason: HOSPADM

## 2020-02-07 RX ORDER — DEXTROMETHORPHAN/PSEUDOEPHED 2.5-7.5/.8
1.2 DROPS ORAL
Status: DISCONTINUED | OUTPATIENT
Start: 2020-02-07 | End: 2020-02-07 | Stop reason: HOSPADM

## 2020-02-07 RX ORDER — SODIUM CHLORIDE 9 MG/ML
INJECTION, SOLUTION INTRAVENOUS
Status: DISCONTINUED | OUTPATIENT
Start: 2020-02-07 | End: 2020-02-07 | Stop reason: HOSPADM

## 2020-02-07 RX ORDER — ATROPINE SULFATE 0.1 MG/ML
0.5 INJECTION INTRAVENOUS
Status: DISCONTINUED | OUTPATIENT
Start: 2020-02-07 | End: 2020-02-07 | Stop reason: HOSPADM

## 2020-02-07 RX ORDER — FLUMAZENIL 0.1 MG/ML
0.2 INJECTION INTRAVENOUS
Status: DISCONTINUED | OUTPATIENT
Start: 2020-02-07 | End: 2020-02-07 | Stop reason: HOSPADM

## 2020-02-07 RX ORDER — SODIUM CHLORIDE 0.9 % (FLUSH) 0.9 %
5-40 SYRINGE (ML) INJECTION AS NEEDED
Status: DISCONTINUED | OUTPATIENT
Start: 2020-02-07 | End: 2020-02-07 | Stop reason: HOSPADM

## 2020-02-07 RX ORDER — SODIUM CHLORIDE 0.9 % (FLUSH) 0.9 %
5-40 SYRINGE (ML) INJECTION EVERY 8 HOURS
Status: DISCONTINUED | OUTPATIENT
Start: 2020-02-07 | End: 2020-02-07 | Stop reason: HOSPADM

## 2020-02-07 RX ORDER — NALOXONE HYDROCHLORIDE 0.4 MG/ML
0.4 INJECTION, SOLUTION INTRAMUSCULAR; INTRAVENOUS; SUBCUTANEOUS
Status: DISCONTINUED | OUTPATIENT
Start: 2020-02-07 | End: 2020-02-07 | Stop reason: HOSPADM

## 2020-02-07 RX ADMIN — PROPOFOL 50 MG: 10 INJECTION, EMULSION INTRAVENOUS at 10:03

## 2020-02-07 RX ADMIN — PROPOFOL 50 MG: 10 INJECTION, EMULSION INTRAVENOUS at 10:11

## 2020-02-07 RX ADMIN — SODIUM CHLORIDE: 900 INJECTION, SOLUTION INTRAVENOUS at 09:54

## 2020-02-07 RX ADMIN — PROPOFOL 50 MG: 10 INJECTION, EMULSION INTRAVENOUS at 10:07

## 2020-02-07 RX ADMIN — PROPOFOL 50 MG: 10 INJECTION, EMULSION INTRAVENOUS at 10:21

## 2020-02-07 RX ADMIN — PROPOFOL 50 MG: 10 INJECTION, EMULSION INTRAVENOUS at 10:00

## 2020-02-07 RX ADMIN — PROPOFOL 50 MG: 10 INJECTION, EMULSION INTRAVENOUS at 10:16

## 2020-02-07 RX ADMIN — PROPOFOL 100 MG: 10 INJECTION, EMULSION INTRAVENOUS at 09:58

## 2020-02-07 NOTE — DISCHARGE INSTRUCTIONS
Favian Saleh  [id number]  1950    COLON DISCHARGE INSTRUCTIONS  Discomfort:  Redness at IV site- apply warm compress to area; if redness or soreness persist- contact your physician  There may be a slight amount of blood passed from the rectum  Gaseous discomfort- walking, belching will help relieve any discomfort  You may not operate a vehicle for 12 hours  You may not engage in an occupation involving machinery or appliances for rest of today  You may not drink alcoholic beverages for at least 12 hours  Avoid making any critical decisions for at least 24 hour  DIET:   High fiber diet. - however -  remember your colon is empty and a heavy meal will produce gas. Avoid these foods:  vegetables, fried / greasy foods, carbonated drinks for today    MEDICATIONS:  Avoid blood thinners for one week       ACTIVITY:  You may resume your normal daily activities it is recommended that you spend the remainder of the day resting -  avoid any strenuous activity. CALL M.D. ANY SIGN OF:   Increasing pain, nausea, vomiting  Abdominal distension (swelling)  New increased bleeding (oral or rectal)  Fever (chills)  Pain in chest area  Bloody discharge from nose or mouth  Shortness of breath     Follow-up Instructions:   Call Terence Rowell MD if any questions or problems. Telephone # [phone]  Biopsy results will be available in  7 to10 days  Should have a repeat colonoscopy in 1 year. COLONOSCOPY FINDINGS:  Your colonoscopy showed: ascending colon polyp. Patient Education        Diverticulosis: Care Instructions  Your Care Instructions  In diverticulosis, pouches called diverticula form in the wall of the large intestine (colon). The pouches do not cause any pain or other symptoms. Most people who have diverticulosis do not know they have it. But the pouches sometimes bleed, and if they become infected, they can cause pain and other symptoms. When this happens, it is called diverticulitis.   Diverticula form when pressure pushes the wall of the colon outward at certain weak points. A diet that is too low in fiber can cause diverticula. Follow-up care is a key part of your treatment and safety. Be sure to make and go to all appointments, and call your doctor if you are having problems. It's also a good idea to know your test results and keep a list of the medicines you take. How can you care for yourself at home? · Include fruits, leafy green vegetables, beans, and whole grains in your diet each day. These foods are high in fiber. · Take a fiber supplement, such as Citrucel or Metamucil, every day if needed. Read and follow all instructions on the label. · Drink plenty of fluids, enough so that your urine is light yellow or clear like water. If you have kidney, heart, or liver disease and have to limit fluids, talk with your doctor before you increase the amount of fluids you drink. · Get at least 30 minutes of exercise on most days of the week. Walking is a good choice. You also may want to do other activities, such as running, swimming, cycling, or playing tennis or team sports. · Cut out foods that cause gas, pain, or other symptoms. When should you call for help? Call your doctor now or seek immediate medical care if:    · You have belly pain.     · You pass maroon or very bloody stools.     · You have a fever.     · You have nausea and vomiting.     · You have unusual changes in your bowel movements or abdominal swelling.     · You have burning pain when you urinate.     · You have abnormal vaginal discharge.     · You have shoulder pain.     · You have cramping pain that does not get better when you have a bowel movement or pass gas.     · You pass gas or stool from your urethra while urinating.    Watch closely for changes in your health, and be sure to contact your doctor if you have any problems. Where can you learn more? Go to http://gilma-mindi.info/.   Enter U536 in the search box to learn more about \"Diverticulosis: Care Instructions. \"  Current as of: November 7, 2018  Content Version: 12.2  © 5064-4535 [employer]. Care instructions adapted under license by "[employer] (which disclaims liability or warranty for this information). If you have questions about a medical condition or this instruction, always ask your healthcare professional. [address] any warranty or liability for your use of this information.

## 2020-02-07 NOTE — PROGRESS NOTES

## 2020-02-07 NOTE — OP NOTES
Colonoscopy Procedure Note    Indications: Previous adenomatous polyp    Anesthesia/Sedation: MAC anesthesia Propofol    Pre-Procedure Exam:  Airway: clear   Heart: normal S1and S2    Lungs: clear bilateral  Abdomen: soft, nontender, bowel sounds present and normal in all quadrants   Mental Status: awake, alert, and oriented to person, place, and time      Procedure in Detail:  Informed consent was obtained for the procedure, including sedation. Risks of perforation, hemorrhage, adverse drug reaction, and aspiration were discussed. The patient was placed in the left lateral decubitus position. Based on the pre-procedure assessment, including review of the patient's medical history, medications, allergies, and review of systems, he had been deemed to be an appropriate candidate for moderate sedation; he was therefore sedated with the medications listed above. The patient was monitored continuously with ECG tracing, pulse oximetry, blood pressure monitoring, and direct observations. A rectal examination was performed. The [id number] was inserted into the rectum and advanced under direct vision to the cecum, which was identified by the ileocecal valve and appendiceal orifice. The quality of the colonic preparation was excellent. A careful inspection was made as the colonoscope was withdrawn, including a retroflexed view of the rectum; findings and interventions are described below. Appropriate photodocumentation was obtained. Findings:   Rectum:   Normal  Sigmoid:     - Excavated lesions:     - Diverticulosis    - slight stricture requiring pediatric colonoscope   Exhaustive search for previous polyp was unsuccessful. Perhaps flattened out. Perhaps stuck behind a fold.    Descending Colon:   Normal  Transverse Colon:   Normal  Ascending Colon:     - Protruding lesions:     -Sessile Polyp(s) size 10 mm removed by polypectomy (hot biopsy) removed in piecemeal fashion  Cecum:   Normal          Specimens: Specimens were collected and sent to pathology. EBL: None    Complications: None; patient tolerated the procedure well. Attending Attestation: I performed the procedure.     Recommendations:    - repeat colonoscopy in 1 year

## 2020-02-07 NOTE — ANESTHESIA POSTPROCEDURE EVALUATION
Procedure(s):  COLONOSCOPY  ENDOSCOPIC POLYPECTOMY. MAC    Anesthesia Post Evaluation      Multimodal analgesia: multimodal analgesia used between 6 hours prior to anesthesia start to PACU discharge  Patient location during evaluation: bedside  Patient participation: waiting for patient participation  Level of consciousness: awake  Pain management: adequate  Airway patency: patent  Anesthetic complications: no  Cardiovascular status: acceptable  Respiratory status: unassisted  Hydration status: acceptable  Comments: Post-Anesthesia Evaluation and Assessment    I have evaluated the patient and they are ready for PACU discharge. Patient: Jennifer Lindo MRN: [id number]  SSN: [id number]   YOB: 1950  Age: 78 y.o. Sex: male      Cardiovascular Function/Vital Signs  /65   Pulse 88   Temp 36.5 °C (97.7 °F)   Resp (!) 69   Wt 95.7 kg (211 lb)   SpO2 95%   BMI 31.16 kg/m²     Patient is status post MAC anesthesia for Procedure(s):  COLONOSCOPY  ENDOSCOPIC POLYPECTOMY. Nausea/Vomiting: None    Postoperative hydration reviewed and adequate. Pain:  Pain Scale 1: Numeric (0 - 10) (02/07/20 0929)  Pain Intensity 1: 0 (02/07/20 0929)   Managed    Neurological Status: At baseline    Mental Status, Level of Consciousness: Alert and  oriented to person, place, and time    Pulmonary Status:   O2 Device: Nasal cannula (02/07/20 1040)   Adequate oxygenation and airway patent    Complications related to anesthesia: None    Post-anesthesia assessment completed. No concerns    Signed By: Manny Burden MD    February 7, 2020                   Vitals Value Taken Time   /51 2/7/2020 10:49 AM   Temp     Pulse 66 2/7/2020 10:51 AM   Resp 15 2/7/2020 10:51 AM   SpO2 94 % 2/7/2020 10:51 AM   Vitals shown include unvalidated device data.

## 2020-02-07 NOTE — BRIEF OP NOTE
BRIEF OPERATIVE NOTE    Date of Procedure: 2/7/2020   Preoperative Diagnosis: RECTAL BLEEDING  HISTORY OF COLON POLYPS  Postoperative Diagnosis: 1. - Ascending Colon Polyp  2. - Diverticulosis    Procedure(s):  COLONOSCOPY  ENDOSCOPIC POLYPECTOMY  Surgeon(s) and Role:     * Lotus Stock MD - Primary         Surgical Assistant:     Surgical Staff:  Endoscopy Technician-1: Gaby Cason  Endoscopy RN-1: Chemo Schmitt RN  No case tracking events are documented in the log.   Anesthesia: MAC   Estimated Blood Loss: none  Specimens:   ID Type Source Tests Collected by Time Destination   1 : Ascending Colon Polyp Preservative   Lotus Stock MD 2/7/2020 1016 Pathology      Findings: ascending colon polyp, severe diverticulosis   Complications: none apparent  Implants: * No implants in log *

## 2020-02-07 NOTE — ROUTINE PROCESS
Mike Estes 1950 
[id number] Situation: 
Verbal report received from: Charlie Kirkpatrick Procedure: Procedure(s): 
COLONOSCOPY 
ENDOSCOPIC POLYPECTOMY Background: 
 
Preoperative diagnosis: RECTAL BLEEDING 
HISTORY OF COLON POLYPS Postoperative diagnosis: 1. - Ascending Colon Polyp 2. - Diverticulosis :  Dr. Gayle Hernandez Assistant(s): Endoscopy Technician-1: Fredy Luis Endoscopy RN-1: Kodi Mosqueda RN Specimens:  
ID Type Source Tests Collected by Time Destination 1 : Ascending Colon Polyp Preservative   Louis Guthrie MD 2/7/2020 1016 Pathology H. Pylori Assessment: 
Intra-procedure medications Anesthesia gave intra-procedure sedation and medications, see anesthesia flow sheet yes Intravenous fluids: NS@ Merleen Innocent Vital signs stable yes Abdominal assessment: round and soft yes Recommendation: 
Discharge patient per MD order yes Return to floor Family or Friend yes Permission to share finding with family or friend yes

## 2020-02-07 NOTE — ANESTHESIA PREPROCEDURE EVALUATION
Relevant Problems   No relevant active problems       Anesthetic History   No history of anesthetic complications            Review of Systems / Medical History  Patient summary reviewed, nursing notes reviewed and pertinent labs reviewed    Pulmonary    COPD    Sleep apnea           Neuro/Psych   Within defined limits           Cardiovascular    Hypertension        Dysrhythmias   CAD         GI/Hepatic/Renal     GERD           Endo/Other  Within defined limits           Other Findings              Physical Exam    Airway  Mallampati: II  TM Distance: > 6 cm  Neck ROM: normal range of motion   Mouth opening: Normal     Cardiovascular  Regular rate and rhythm,  S1 and S2 normal,  no murmur, click, rub, or gallop             Dental  No notable dental hx       Pulmonary  Breath sounds clear to auscultation               Abdominal  GI exam deferred       Other Findings            Anesthetic Plan    ASA: 3  Anesthesia type: MAC            Anesthetic plan and risks discussed with: Patient

## 2020-02-07 NOTE — H&P
Colon and Rectal Surgery History and Physical    Subjective:      Katalina Crawford is a 78 y.o. male who has hx jm    Patient Active Problem List    Diagnosis Date Noted    PAD (peripheral artery disease) ([address] 10/29/2014    Acute on chronic systolic heart failure ([address] 2014    Atrial flutter ([address] 10/30/2010    COPD (chronic obstructive pulmonary disease) ([address] 10/30/2010    Dilated cardiomyopathy ([address] 10/30/2010    ETOH abuse 10/30/2010     Past Medical History:   Diagnosis Date    Atrial fibrillation ([address]     CAD (coronary artery disease)     Cancer ([address]     Colon CA  - polyp removed/chemo/radiation    Chronic obstructive pulmonary disease (HCC)     GERD (gastroesophageal reflux disease)     Heart failure (HCC)     Hypertension     Sleep apnea     not using machine because of surgery on tongue      Past Surgical History:   Procedure Laterality Date    COLONOSCOPY N/A 2018    COLONOSCOPY performed by Maria Elena Jeff MD at [facility] ENDOSCOPY    COLONOSCOPY N/A 1/3/2020    COLONOSCOPY performed by Mere Khalil MD at [facility] ENDOSCOPY    HX AFIB ABLATION      HX GI      cancerous polyps removed  - pt usnure if done in OR or during colonoscopy    HX GI      colonoscopy x 2 - polyps both times    HX ORTHOPAEDIC      killed nerves in feet    VASCULAR SURGERY PROCEDURE UNLIST      stents legs bilaterally       Social History     Tobacco Use    Smoking status: Former Smoker     Last attempt to quit: 3/1/2011     Years since quittin.9    Smokeless tobacco: Never Used   Substance Use Topics    Alcohol use: No      Family History   Problem Relation Age of Onset    Alcohol abuse Father     Cancer Father         lung    Cancer Brother         throat    Stroke Brother       Prior to Admission medications    Medication Sig Start Date End Date Taking? Authorizing Provider   ibuprofen (ADVIL) 200 mg tablet Take  by mouth.    Yes Provider, Historical   tiotropium (Aziza Jade WITH HANDIHALER) 18 mcg inhalation capsule Take 1 Cap by inhalation daily. Yes Provider, Historical   fluticasone-salmeterol (ADVAIR DISKUS) 250-50 mcg/dose diskus inhaler Take 1 Puff by inhalation two (2) times a day. Yes Provider, Historical   gabapentin (NEURONTIN) 300 mg capsule Take 600 mg by mouth two (2) times a day. Yes Provider, Historical   ASPIRIN PO Take 81 mg by mouth daily. Yes Provider, Historical   pravastatin (PRAVACHOL) 40 mg tablet Take 40 mg by mouth daily. Yes Provider, Historical   furosemide (LASIX) 80 mg tablet Take 80 mg by mouth daily. Yes Provider, Historical   carvedilol (COREG) 6.25 mg tablet Take 6.25 mg by mouth two (2) times a day. Yes Provider, Historical   digoxin (LANOXIN) 0.125 mg tablet Take 0.125 mg by mouth daily. Yes Provider, Historical   folic acid (FOLVITE) 1 mg tablet Take 1 Tab by mouth daily. 1/24/14  Yes Rigo Mooney MD   potassium chloride SR (KLOR-CON 10) 10 mEq tablet Take 2 Tabs by mouth two (2) times a day. 1/24/14  Yes Rigo Mooney MD   clopidogrel (PLAVIX) 75 mg tab Take 75 mg by mouth daily. Provider, Historical     No Known Allergies     Review of Systems:    A comprehensive review of systems was negative except for that written in the History of Present Illness. Objective:     Visit Vitals  /57   Pulse (!) 59   Temp 98.3 °F (36.8 °C)   Resp 17   Wt 95.7 kg (211 lb)   SpO2 92%   BMI 31.16 kg/m²        Physical Exam:   General:  Alert, cooperative, no distress, appears stated age. Head:  Normocephalic, without obvious abnormality, atraumatic. Eyes:  Conjunctivae/corneas clear. PERRL, EOMs intact. Ears:  Normal external ear canals both ears. Nose: Nares normal. Septum midline. No drainage. Throat: Lips, mucosa, and tongue normal. Teeth and gums normal.   Neck: Supple, symmetrical, trachea midline, no adenopathy   Back:   Symmetric, no curvature. ROM normal.   Lungs:   Clear   Chest wall:  No tenderness or deformity. Heart:  Regular rate and rhythm       Abdomen:   Soft, non-tender. Bowel sounds normal. No masses,  No organomegaly. Genitalia:  deferred       Extremities: Extremities normal, atraumatic, no cyanosis or edema. Skin: Skin color, texture, turgor normal. No rashes or lesions. Neurologic: CNII-XII intact. Normal strength, sensation and reflexes throughout. Imaging:  images and reports reviewed    Lab Review:  No results found for this or any previous visit (from the past 24 hour(s)). Labs and radiology: images and reports reviewed      Assessment:     Hx jm    Plan:     1. I recommend proceeding with colonoscopy. Treatment alternatives were discussed. 2. Discussed aspects of surgical intervention, methods, risks (including by not limited to infection, bleeding, hematoma, and perforation of the intestines or solid organs), and the risks of general anesthetic. The patient understands the risks; any and all questions were answered to the patient's satisfaction.     Signed By: Daryl Carrera MD     February 7, 2020

## 2020-04-01 ENCOUNTER — HOSPITAL ENCOUNTER (OUTPATIENT)
Dept: ULTRASOUND IMAGING | Age: 70
Discharge: HOME | End: 2020-04-01
Payer: MEDICARE

## 2020-04-01 ENCOUNTER — HOSPITAL ENCOUNTER (OUTPATIENT)
Dept: VASCULAR SURGERY | Age: 70
Discharge: HOME | End: 2020-04-01
Attending: FAMILY MEDICINE
Payer: MEDICARE

## 2020-04-01 DIAGNOSIS — M79.89 SWELLING OF LIMB: ICD-10-CM

## 2020-04-01 DIAGNOSIS — M79.89 LEG SWELLING: ICD-10-CM

## 2020-04-01 PROCEDURE — 93970 EXTREMITY STUDY: CPT

## 2020-05-29 ENCOUNTER — HOSPITAL ENCOUNTER (OUTPATIENT)
Age: 70
Setting detail: OUTPATIENT SURGERY
Discharge: HOME | End: 2020-05-29
Attending: COLON & RECTAL SURGERY | Admitting: COLON & RECTAL SURGERY
Payer: MEDICARE

## 2020-05-29 ENCOUNTER — ANESTHESIA (OUTPATIENT)
Dept: ENDOSCOPY | Age: 70
End: 2020-05-29
Payer: MEDICARE

## 2020-05-29 ENCOUNTER — ANESTHESIA EVENT (OUTPATIENT)
Dept: ENDOSCOPY | Age: 70
End: 2020-05-29
Payer: MEDICARE

## 2020-05-29 VITALS
BODY MASS INDEX: 31.1 KG/M2 | WEIGHT: 210 LBS | TEMPERATURE: 98.1 F | RESPIRATION RATE: 17 BRPM | DIASTOLIC BLOOD PRESSURE: 84 MMHG | HEART RATE: 88 BPM | OXYGEN SATURATION: 96 % | SYSTOLIC BLOOD PRESSURE: 140 MMHG | HEIGHT: 69 IN

## 2020-05-29 PROCEDURE — 88305 TISSUE EXAM BY PATHOLOGIST: CPT

## 2020-05-29 PROCEDURE — 76060000031 HC ANESTHESIA FIRST 0.5 HR: Performed by: COLON & RECTAL SURGERY

## 2020-05-29 PROCEDURE — 74011250636 HC RX REV CODE- 250/636: Performed by: NURSE ANESTHETIST, CERTIFIED REGISTERED

## 2020-05-29 PROCEDURE — 74011000250 HC RX REV CODE- 250: Performed by: NURSE ANESTHETIST, CERTIFIED REGISTERED

## 2020-05-29 PROCEDURE — 76040000019: Performed by: COLON & RECTAL SURGERY

## 2020-05-29 PROCEDURE — 77030013992 HC SNR POLYP ENDOSC BSC -B: Performed by: COLON & RECTAL SURGERY

## 2020-05-29 PROCEDURE — 74011250636 HC RX REV CODE- 250/636: Performed by: COLON & RECTAL SURGERY

## 2020-05-29 PROCEDURE — 77030035621 HC PRB COAG APC 360DEG ERBE -C: Performed by: COLON & RECTAL SURGERY

## 2020-05-29 PROCEDURE — 77030010936 HC CLP LIG BSC -C: Performed by: COLON & RECTAL SURGERY

## 2020-05-29 RX ORDER — EPINEPHRINE 0.1 MG/ML
1 INJECTION INTRACARDIAC; INTRAVENOUS
Status: DISCONTINUED | OUTPATIENT
Start: 2020-05-29 | End: 2020-05-29 | Stop reason: HOSPADM

## 2020-05-29 RX ORDER — LIDOCAINE HYDROCHLORIDE 20 MG/ML
INJECTION, SOLUTION EPIDURAL; INFILTRATION; INTRACAUDAL; PERINEURAL AS NEEDED
Status: DISCONTINUED | OUTPATIENT
Start: 2020-05-29 | End: 2020-05-29 | Stop reason: HOSPADM

## 2020-05-29 RX ORDER — NALOXONE HYDROCHLORIDE 0.4 MG/ML
0.4 INJECTION, SOLUTION INTRAMUSCULAR; INTRAVENOUS; SUBCUTANEOUS
Status: DISCONTINUED | OUTPATIENT
Start: 2020-05-29 | End: 2020-05-29 | Stop reason: HOSPADM

## 2020-05-29 RX ORDER — SODIUM CHLORIDE 9 MG/ML
50 INJECTION, SOLUTION INTRAVENOUS CONTINUOUS
Status: DISCONTINUED | OUTPATIENT
Start: 2020-05-29 | End: 2020-05-29 | Stop reason: HOSPADM

## 2020-05-29 RX ORDER — DEXTROMETHORPHAN/PSEUDOEPHED 2.5-7.5/.8
1.2 DROPS ORAL
Status: DISCONTINUED | OUTPATIENT
Start: 2020-05-29 | End: 2020-05-29 | Stop reason: HOSPADM

## 2020-05-29 RX ORDER — SODIUM CHLORIDE 0.9 % (FLUSH) 0.9 %
5-40 SYRINGE (ML) INJECTION AS NEEDED
Status: DISCONTINUED | OUTPATIENT
Start: 2020-05-29 | End: 2020-05-29 | Stop reason: HOSPADM

## 2020-05-29 RX ORDER — SODIUM CHLORIDE 0.9 % (FLUSH) 0.9 %
5-40 SYRINGE (ML) INJECTION EVERY 8 HOURS
Status: DISCONTINUED | OUTPATIENT
Start: 2020-05-29 | End: 2020-05-29 | Stop reason: HOSPADM

## 2020-05-29 RX ORDER — ATROPINE SULFATE 0.1 MG/ML
0.5 INJECTION INTRAVENOUS
Status: DISCONTINUED | OUTPATIENT
Start: 2020-05-29 | End: 2020-05-29 | Stop reason: HOSPADM

## 2020-05-29 RX ORDER — PROPOFOL 10 MG/ML
INJECTION, EMULSION INTRAVENOUS AS NEEDED
Status: DISCONTINUED | OUTPATIENT
Start: 2020-05-29 | End: 2020-05-29 | Stop reason: HOSPADM

## 2020-05-29 RX ORDER — FLUMAZENIL 0.1 MG/ML
0.2 INJECTION INTRAVENOUS
Status: DISCONTINUED | OUTPATIENT
Start: 2020-05-29 | End: 2020-05-29 | Stop reason: HOSPADM

## 2020-05-29 RX ORDER — SODIUM CHLORIDE 9 MG/ML
INJECTION, SOLUTION INTRAVENOUS
Status: DISCONTINUED | OUTPATIENT
Start: 2020-05-29 | End: 2020-05-29 | Stop reason: HOSPADM

## 2020-05-29 RX ADMIN — PROPOFOL 100 MG: 10 INJECTION, EMULSION INTRAVENOUS at 08:53

## 2020-05-29 RX ADMIN — PROPOFOL 30 MG: 10 INJECTION, EMULSION INTRAVENOUS at 09:16

## 2020-05-29 RX ADMIN — PROPOFOL 30 MG: 10 INJECTION, EMULSION INTRAVENOUS at 09:01

## 2020-05-29 RX ADMIN — SODIUM CHLORIDE: 900 INJECTION, SOLUTION INTRAVENOUS at 08:42

## 2020-05-29 RX ADMIN — PROPOFOL 50 MG: 10 INJECTION, EMULSION INTRAVENOUS at 09:08

## 2020-05-29 RX ADMIN — LIDOCAINE HYDROCHLORIDE 50 MG: 20 INJECTION, SOLUTION EPIDURAL; INFILTRATION; INTRACAUDAL; PERINEURAL at 08:53

## 2020-05-29 NOTE — OP NOTES
Colonoscopy Procedure Note    Indications: Previous adenomatous polyp, radiation proctitis    Anesthesia/Sedation: MAC anesthesia Propofol    Pre-Procedure Exam:  Airway: clear   Heart: normal S1and S2    Lungs: clear bilateral  Abdomen: soft, nontender, bowel sounds present and normal in all quadrants   Mental Status: awake, alert, and oriented to person, place, and time      Procedure in Detail:  Informed consent was obtained for the procedure, including sedation. Risks of perforation, hemorrhage, adverse drug reaction, and aspiration were discussed. The patient was placed in the left lateral decubitus position. Based on the pre-procedure assessment, including review of the patient's medical history, medications, allergies, and review of systems, he had been deemed to be an appropriate candidate for moderate sedation; he was therefore sedated with the medications listed above. The patient was monitored continuously with ECG tracing, pulse oximetry, blood pressure monitoring, and direct observations. A rectal examination was performed. The [id number] was inserted into the rectum and advanced under direct vision to the sigmoid colon. The quality of the colonic preparation was fair. A careful inspection was made as the colonoscope was withdrawn, including a retroflexed view of the rectum; findings and interventions are described below. Appropriate photodocumentation was obtained. Findings:   Rectum:   - friable mucosa with telangiectatic mucosa. Argon beam coagulation to burn the friable mucosa of the distal rectum. Sigmoid:   - Excavated lesions:     - Diverticulosis    - Protruding lesions:     -Sessile Polyp(s) size 10 mm removed by polypectomy (snare cautery) and placement of resolution clip    Specimens: Specimens were collected and sent to pathology. EBL: Minimal    Complications: None; patient tolerated the procedure well.     Attending Attestation: I performed the procedure.     Recommendations:    - repeat colonosocpy in 1 year

## 2020-05-29 NOTE — H&P
Colon and Rectal Surgery History and Physical    Subjective:      Zeke Correa is a 78 y.o. male who has a history of radiation proctitis    Patient Active Problem List    Diagnosis Date Noted    PAD (peripheral artery disease) ([address] 10/29/2014    Acute on chronic systolic heart failure ([address] 2014    Atrial flutter ([address] 10/30/2010    COPD (chronic obstructive pulmonary disease) ([address] 10/30/2010    Dilated cardiomyopathy ([address] 10/30/2010    ETOH abuse 10/30/2010     Past Medical History:   Diagnosis Date    Atrial fibrillation ([address]     CAD (coronary artery disease)     Cancer ([address]     Colon CA  - polyp removed/chemo/radiation    Chronic obstructive pulmonary disease (HCC)     GERD (gastroesophageal reflux disease)     Heart failure (HCC)     Hypertension     Sleep apnea     not using machine because of surgery on tongue      Past Surgical History:   Procedure Laterality Date    COLONOSCOPY N/A 2018    COLON[facility]ly Flores [facility] at [facility] ENDOSCOPY    COLONOSCOPY N/A 1/3/2020    COLONOSCOPY performed by Precious Goltz, MD at [facility] ENDOSCOPY    COLONOSCOPY N/A 2020    COLONOSCOPY performed by Precious Goltz, MD at [facility] ENDOSCOPY    HX AFIB ABLATION      HX GI      cancerous polyps removed  - pt usnure if done in OR or during colonoscopy    HX GI      colonoscopy x 2 - polyps both times    HX ORTHOPAEDIC      killed nerves in feet    VASCULAR SURGERY PROCEDURE UNLIST      stents legs bilaterally       Social History     Tobacco Use    Smoking status: Former Smoker     Last attempt to quit: 3/1/2011     Years since quittin.2    Smokeless tobacco: Never Used   Substance Use Topics    Alcohol use: No      Family History   Problem Relation Age of Onset    Alcohol abuse Father     Cancer Father         lung    Cancer Brother         throat    Stroke Brother       Prior to Admission medications    Medication Sig Start Date End Date Taking? Authorizing Provider   gabapentin (NEURONTIN) 300 mg capsule Take 600 mg by mouth two (2) times a day. Yes Provider, Historical   ASPIRIN PO Take 81 mg by mouth daily. Yes Provider, Historical   clopidogrel (PLAVIX) 75 mg tab Take 75 mg by mouth daily. Yes Provider, Historical   pravastatin (PRAVACHOL) 40 mg tablet Take 40 mg by mouth daily. Yes Provider, Historical   furosemide (LASIX) 80 mg tablet Take 80 mg by mouth daily. Yes Provider, Historical   carvedilol (COREG) 6.25 mg tablet Take 6.25 mg by mouth two (2) times a day. Yes Provider, Historical   digoxin (LANOXIN) 0.125 mg tablet Take 0.125 mg by mouth daily. Yes Provider, Historical   folic acid (FOLVITE) 1 mg tablet Take 1 Tab by mouth daily. 1/24/14  Yes Estefania Werner MD   potassium chloride SR (KLOR-CON 10) 10 mEq tablet Take 2 Tabs by mouth two (2) times a day. 1/24/14  Yes Estefania Werner MD   ibuprofen (ADVIL) 200 mg tablet Take  by mouth. Provider, Historical   tiotropium (SPIRIVA WITH HANDIHALER) 18 mcg inhalation capsule Take 1 Cap by inhalation daily. Provider, Historical   fluticasone-salmeterol (ADVAIR DISKUS) 250-50 mcg/dose diskus inhaler Take 1 Puff by inhalation two (2) times a day. Provider, Historical     No Known Allergies     Review of Systems:    A comprehensive review of systems was negative except for that written in the History of Present Illness. Objective:     Visit Vitals  /57   Pulse 80   Temp 98.1 °F (36.7 °C)   Resp 15   Ht 5' 9\" (1.753 m)   Wt 95.3 kg (210 lb)   SpO2 90%   BMI 31.01 kg/m²        Physical Exam:   General:  Alert, cooperative, no distress, appears stated age. Head:  Normocephalic, without obvious abnormality, atraumatic. Eyes:  Conjunctivae/corneas clear. PERRL, EOMs intact. Ears:  Normal external ear canals both ears. Nose: Nares normal. Septum midline. No drainage.    Throat: Lips, mucosa, and tongue normal. Teeth and gums normal.   Neck: Supple, symmetrical, trachea midline, no adenopathy   Back:   Symmetric, no curvature. ROM normal.   Lungs:   Clear   Chest wall:  No tenderness or deformity. Heart:  Regular rate and rhythm       Abdomen:   Soft, non-tender. Bowel sounds normal. No masses,  No organomegaly. Genitalia:  deferred       Extremities: Extremities normal, atraumatic, no cyanosis or edema. Skin: Skin color, texture, turgor normal. No rashes or lesions. Neurologic: CNII-XII intact. Normal strength, sensation and reflexes throughout. Imaging:  images and reports reviewed    Lab Review:  No results found for this or any previous visit (from the past 24 hour(s)). Labs and radiology: images and reports reviewed      Assessment:     Hx radiation proctitis    Plan:     1. I recommend proceeding with flex sig with argon beam coag. Treatment alternatives were discussed. 2. Discussed aspects of surgical intervention, methods, risks (including by not limited to infection, bleeding, hematoma, and perforation of the intestines or solid organs), and the risks of general anesthetic. The patient understands the risks; any and all questions were answered to the patient's satisfaction.     Signed By: Maryanne Monahan MD     May 29, 2020

## 2020-05-29 NOTE — ANESTHESIA PREPROCEDURE EVALUATION
Relevant Problems   No relevant active problems       Anesthetic History   No history of anesthetic complications            Review of Systems / Medical History  Patient summary reviewed, nursing notes reviewed and pertinent labs reviewed    Pulmonary    COPD    Sleep apnea           Neuro/Psych   Within defined limits           Cardiovascular    Hypertension        Dysrhythmias   CAD      Comments: --  Analysis of regional contractile function demonstrated moderate global  hypokinesis. --  Global left ventricular function was moderately  depressed. EF calculated by contrast ventriculography was 40 %. --  CORONARY CIRCULATION:  --  RCA: There was a diffuse 100 % stenosis in the proximal third of the  vessel segment. This lesion is a chronic total occlusion. DISPOSITION: The patient left the catheterization laboratory in stable  condition. There was written documentation of a referral for the patient  to an outpatient cardiac rehabilitation program. The patient has been  instructed to follow up with the procedural cardiologist in the near  future.    GI/Hepatic/Renal     GERD           Endo/Other  Within defined limits           Other Findings   Comments: Colon cancer         Physical Exam    Airway  Mallampati: II  TM Distance: 4 - 6 cm  Neck ROM: normal range of motion   Mouth opening: Normal     Cardiovascular  Regular rate and rhythm,  S1 and S2 normal,  no murmur, click, rub, or gallop             Dental  No notable dental hx       Pulmonary  Breath sounds clear to auscultation               Abdominal  GI exam deferred       Other Findings            Anesthetic Plan    ASA: 3  Anesthesia type: MAC            Anesthetic plan and risks discussed with: Patient

## 2020-05-29 NOTE — DISCHARGE INSTRUCTIONS
Ana Cristina Young  [id number]  1950    COLON DISCHARGE INSTRUCTIONS  Discomfort:  Redness at IV site- apply warm compress to area; if redness or soreness persist- contact your physician  There may be a slight amount of blood passed from the rectum  Gaseous discomfort- walking, belching will help relieve any discomfort  You may not operate a vehicle for 12 hours  You may not engage in an occupation involving machinery or appliances for rest of today  You may not drink alcoholic beverages for at least 12 hours  Avoid making any critical decisions for at least 24 hour  DIET:   High fiber diet. - however -  remember your colon is empty and a heavy meal will produce gas. Avoid these foods:  vegetables, fried / greasy foods, carbonated drinks for today    MEDICATIONS:  resume       ACTIVITY:  You may resume your normal daily activities it is recommended that you spend the remainder of the day resting -  avoid any strenuous activity. CALL M.D. ANY SIGN OF:   Increasing pain, nausea, vomiting  Abdominal distension (swelling)  New increased bleeding (oral or rectal)  Fever (chills)  Pain in chest area  Bloody discharge from nose or mouth  Shortness of breath     Follow-up Instructions:   Call Sarah Ybarra MD if any questions or problems. Telephone # [phone]  Biopsy results will be available in  7 to10 days  Should have a repeat colonoscopy in 1 year. COLONOSCOPY FINDINGS:  Your colonoscopy showed: 1 polyp, friable rectal mucosa.

## 2020-05-29 NOTE — PERIOP NOTES

## 2020-05-29 NOTE — ROUTINE PROCESS
[facility] 1950 
[id number] Situation: 
Verbal report received from: [facility] Procedure: Procedure(s): FLEXIBLE SIGMOIDOSCOPY WITH ARGON BEAM COAGULATION 
ENDOSCOPIC ARGON PLASMA COAGULATION 
ENDOSCOPIC POLYPECTOMY RESOLUTION CLIP Background: 
 
Preoperative diagnosis: RADIATION PROCTITIS Postoperative diagnosis: 1. Diverticulosis 2. Sigmoid Polyp with clip :  Dr. Peters Breaker Assistant(s): Endoscopy Technician-1: Pj Vincent Endoscopy RN-1: Michael Zhang Specimens:  
ID Type Source Tests Collected by Time Destination 1 : Sigmoid Polyp Preservative Sigmoid  Brenna Morin MD 5/29/2020 9531 Pathology Assessment: 
Intra-procedure medications Anesthesia gave intra-procedure sedation and medications, see anesthesia flow sheet Intravenous fluids: NS@ Luisa Asai Vital signs stable Abdominal assessment: round and soft Recommendation: 
Discharge patient per MD order Family-yes Permission to share finding with family -yes

## 2020-05-29 NOTE — BRIEF OP NOTE
Brief Postoperative Note    Patient: Gerhard Botello  YOB: 1950  MRN: [id number]    Date of Procedure: 5/29/2020     Pre-Op Diagnosis: RADIATION PROCTITIS    Post-Op Diagnosis: Same as preoperative diagnosis. and sigmoid colon polyp      Procedure(s): FLEXIBLE SIGMOIDOSCOPY WITH ARGON BEAM COAGULATION  ENDOSCOPIC ARGON PLASMA COAGULATION  ENDOSCOPIC POLYPECTOMY  RESOLUTION CLIP    Surgeon(s):  Dave Lema MD    Surgical Assistant: None    Anesthesia: MAC     Estimated Blood Loss (mL): Minimal    Complications: Bleeding    Specimens:   ID Type Source Tests Collected by Time Destination   1 : Sigmoid Polyp Preservative Sigmoid  Dave Lema MD 5/29/2020 8172 Pathology        Implants: * No implants in log *    Drains: * No LDAs found *    Findings: radiation proctitis with friable rectum. Sigmoid diverticulosis.  Sigmoid colon polyp removed with snare and placement of resolution clip    Electronically Signed by Carmela Medellin MD on 5/29/2020 at 9:20 AM

## 2020-05-29 NOTE — ANESTHESIA POSTPROCEDURE EVALUATION
Post-Anesthesia Evaluation and Assessment    Patient: Garth Harris MRN: [id number]  SSN: [id number]    YOB: 1950  Age: 78 y.o. Sex: male      I have evaluated the patient and they are stable and ready for discharge from the PACU. Cardiovascular Function/Vital Signs  Visit Vitals  /84   Pulse 88   Temp 36.7 °C (98.1 °F)   Resp 17   Ht 5' 9\" (1.753 m)   Wt 95.3 kg (210 lb)   SpO2 96%   BMI 31.01 kg/m²       Patient is status post MAC anesthesia for Procedure(s): FLEXIBLE SIGMOIDOSCOPY WITH ARGON BEAM COAGULATION  ENDOSCOPIC ARGON PLASMA COAGULATION  ENDOSCOPIC POLYPECTOMY  RESOLUTION CLIP. Nausea/Vomiting: None    Postoperative hydration reviewed and adequate. Pain:  Pain Scale 1: Numeric (0 - 10) (05/29/20 0952)  Pain Intensity 1: 0 (05/29/20 0947)   Managed    Neurological Status: At baseline    Mental Status, Level of Consciousness: Alert and  oriented to person, place, and time    Pulmonary Status:   O2 Device: Room air (05/29/20 0952)   Adequate oxygenation and airway patent    Complications related to anesthesia: None    Post-anesthesia assessment completed.  No concerns    Signed By: Marina Jackson MD     May 29, 2020

## 2021-02-24 ENCOUNTER — APPOINTMENT (OUTPATIENT)
Dept: GENERAL RADIOLOGY | Age: 71
End: 2021-02-24
Attending: EMERGENCY MEDICINE
Payer: MEDICARE

## 2021-02-24 ENCOUNTER — HOSPITAL ENCOUNTER (EMERGENCY)
Age: 71
Discharge: HOME | End: 2021-02-24
Attending: EMERGENCY MEDICINE
Payer: MEDICARE

## 2021-02-24 VITALS
OXYGEN SATURATION: 93 % | HEART RATE: 73 BPM | HEIGHT: 69 IN | TEMPERATURE: 98 F | SYSTOLIC BLOOD PRESSURE: 119 MMHG | RESPIRATION RATE: 18 BRPM | WEIGHT: 197.75 LBS | BODY MASS INDEX: 29.29 KG/M2 | DIASTOLIC BLOOD PRESSURE: 52 MMHG

## 2021-02-24 DIAGNOSIS — L03.113 CELLULITIS OF RIGHT UPPER EXTREMITY: ICD-10-CM

## 2021-02-24 DIAGNOSIS — L08.9 FINGER INFECTION: Primary | ICD-10-CM

## 2021-02-24 LAB
ALBUMIN SERPL-MCNC: 3.8 G/DL (ref 3.5–5)
ALBUMIN/GLOB SERPL: 1.1 {RATIO} (ref 1.1–2.2)
ALP SERPL-CCNC: 96 U/L (ref 45–117)
ALT SERPL-CCNC: 12 U/L (ref 12–78)
ANION GAP SERPL CALC-SCNC: 4 MMOL/L (ref 5–15)
AST SERPL-CCNC: 13 U/L (ref 15–37)
BASOPHILS # BLD: 0 K/UL (ref 0–0.1)
BASOPHILS NFR BLD: 1 % (ref 0–1)
BILIRUB SERPL-MCNC: 0.5 MG/DL (ref 0.2–1)
BUN SERPL-MCNC: 23 MG/DL (ref 6–20)
BUN/CREAT SERPL: 13 (ref 12–20)
CALCIUM SERPL-MCNC: 8.9 MG/DL (ref 8.5–10.1)
CHLORIDE SERPL-SCNC: 98 MMOL/L (ref 97–108)
CO2 SERPL-SCNC: 32 MMOL/L (ref 21–32)
CREAT SERPL-MCNC: 1.75 MG/DL (ref 0.7–1.3)
DIFFERENTIAL METHOD BLD: ABNORMAL
EOSINOPHIL # BLD: 0.3 K/UL (ref 0–0.4)
EOSINOPHIL NFR BLD: 6 % (ref 0–7)
ERYTHROCYTE [DISTWIDTH] IN BLOOD BY AUTOMATED COUNT: 19.8 % (ref 11.5–14.5)
GLOBULIN SER CALC-MCNC: 3.6 G/DL (ref 2–4)
GLUCOSE SERPL-MCNC: 83 MG/DL (ref 65–100)
HCT VFR BLD AUTO: 36.4 % (ref 36.6–50.3)
HGB BLD-MCNC: 10.7 G/DL (ref 12.1–17)
IMM GRANULOCYTES # BLD AUTO: 0 K/UL (ref 0–0.04)
IMM GRANULOCYTES NFR BLD AUTO: 0 % (ref 0–0.5)
LACTATE SERPL-SCNC: 1.1 MMOL/L (ref 0.4–2)
LYMPHOCYTES # BLD: 0.9 K/UL (ref 0.8–3.5)
LYMPHOCYTES NFR BLD: 16 % (ref 12–49)
MCH RBC QN AUTO: 21 PG (ref 26–34)
MCHC RBC AUTO-ENTMCNC: 29.4 G/DL (ref 30–36.5)
MCV RBC AUTO: 71.5 FL (ref 80–99)
MONOCYTES # BLD: 0.4 K/UL (ref 0–1)
MONOCYTES NFR BLD: 8 % (ref 5–13)
NEUTS SEG # BLD: 3.9 K/UL (ref 1.8–8)
NEUTS SEG NFR BLD: 69 % (ref 32–75)
NRBC # BLD: 0 K/UL (ref 0–0.01)
NRBC BLD-RTO: 0 PER 100 WBC
PLATELET # BLD AUTO: 186 K/UL (ref 150–400)
PMV BLD AUTO: 9.8 FL (ref 8.9–12.9)
POTASSIUM SERPL-SCNC: 4.5 MMOL/L (ref 3.5–5.1)
PROT SERPL-MCNC: 7.4 G/DL (ref 6.4–8.2)
RBC # BLD AUTO: 5.09 M/UL (ref 4.1–5.7)
SODIUM SERPL-SCNC: 134 MMOL/L (ref 136–145)
WBC # BLD AUTO: 5.6 K/UL (ref 4.1–11.1)

## 2021-02-24 PROCEDURE — 83605 ASSAY OF LACTIC ACID: CPT

## 2021-02-24 PROCEDURE — 75810000139 HC PUNCT ASPIRATION ABCESS

## 2021-02-24 PROCEDURE — 73130 X-RAY EXAM OF HAND: CPT

## 2021-02-24 PROCEDURE — 87040 BLOOD CULTURE FOR BACTERIA: CPT

## 2021-02-24 PROCEDURE — 74011000250 HC RX REV CODE- 250

## 2021-02-24 PROCEDURE — 80053 COMPREHEN METABOLIC PANEL: CPT

## 2021-02-24 PROCEDURE — 85025 COMPLETE CBC W/AUTO DIFF WBC: CPT

## 2021-02-24 PROCEDURE — 36415 COLL VENOUS BLD VENIPUNCTURE: CPT

## 2021-02-24 PROCEDURE — 99284 EMERGENCY DEPT VISIT MOD MDM: CPT

## 2021-02-24 RX ORDER — SULFAMETHOXAZOLE AND TRIMETHOPRIM 800; 160 MG/1; MG/1
1 TABLET ORAL 2 TIMES DAILY
Qty: 14 TAB | Refills: 0 | Status: SHIPPED | OUTPATIENT
Start: 2021-02-24 | End: 2021-03-03

## 2021-02-24 RX ORDER — LIDOCAINE HYDROCHLORIDE 10 MG/ML
INJECTION, SOLUTION EPIDURAL; INFILTRATION; INTRACAUDAL; PERINEURAL
Status: COMPLETED
Start: 2021-02-24 | End: 2021-02-24

## 2021-02-24 RX ORDER — LIDOCAINE HYDROCHLORIDE 10 MG/ML
10 INJECTION, SOLUTION EPIDURAL; INFILTRATION; INTRACAUDAL; PERINEURAL ONCE
Status: COMPLETED | OUTPATIENT
Start: 2021-02-24 | End: 2021-02-24

## 2021-02-24 RX ORDER — CEPHALEXIN 500 MG/1
500 CAPSULE ORAL 3 TIMES DAILY
Qty: 21 CAP | Refills: 0 | Status: SHIPPED | OUTPATIENT
Start: 2021-02-24 | End: 2021-03-03

## 2021-02-24 RX ADMIN — LIDOCAINE HYDROCHLORIDE 10 MG: 10 INJECTION, SOLUTION EPIDURAL; INFILTRATION; INTRACAUDAL; PERINEURAL at 16:27

## 2021-02-24 NOTE — ED NOTES
hypoxic; ranging low 80's-91% with deep breathing reminders; MD notified; pmh COPD but denies wearing O2 at home.

## 2021-02-24 NOTE — ED NOTES
Report given to [facility]. RN was informed of patient chief complaint, current status, orders completed (to include IV access/medications/radiology testing), outstanding orders that still need to be completed, and the treatment plan. Ensured no questions or concerns regarding the patient prior to departure.

## 2021-02-25 ENCOUNTER — PATIENT OUTREACH (OUTPATIENT)
Dept: CASE MANAGEMENT | Age: 71
End: 2021-02-25

## 2021-02-25 NOTE — PROGRESS NOTES
Patient contacted regarding recent discharge and COVID-19 risk. Discussed COVID-19 related testing which was not done at this time. Test results were not done. Patient informed of results, if available? NA    Outreach made within 2 business days of discharge: Yes    Care Transition Nurse/ Ambulatory Care Manager/ LPN Care Coordinator contacted the patient by telephone to perform post discharge assessment. Verified name and  with patient as identifiers. Patient has following risk factors of: heart failure, COPD and ED visit to [address] [facility] on 2021. CTN/ACM/LPN reviewed discharge instructions, medical action plan and red flags related to discharge diagnosis. Reviewed and educated them on any new and changed medications related to discharge diagnosis. Advised obtaining a 90-day supply of all daily and as-needed medications. Advance Care Planning:   Does patient have an Advance Directive: pt has ACP on file    Education provided regarding infection prevention, and signs and symptoms of COVID-19 and when to seek medical attention with patient who verbalized understanding. Discussed exposure protocols and quarantine from [address] [place] [address] you at higher risk for severe illness  and given an opportunity for questions and concerns. The patient agrees to contact the COVID-19 hotline [phone] or PCP office for questions related to their healthcare. CTN/ACM/LPN provided contact information for future reference. From CDC: Are you at higher risk for severe illness?  Wash your hands often.  Avoid close contact (6 feet, which is about two arm lengths) with people who are sick.  Put distance between yourself and other people if COVID-19 is spreading in your community.  Clean and disinfect frequently touched surfaces.  Avoid all cruise travel and non-essential air travel.  Call your healthcare professional if you have concerns about COVID-19 and your underlying condition or if you are sick.     For more information on steps you can take to protect yourself, see CDC's How to Protect Yourself      Patient/family/caregiver given information for [employer] and agrees to enroll not offered    Plan for follow-up call in 1-2 days based on severity of symptoms and risk factors. Pt verified that he has ED prescribed medications and has maintenance medications. Pt reports that PCP prescribed an ABX on 2/22/2021. Pt advised to call PCP concerning additional abx. Pt reports that he is unable to reapply bandage to his finger. Pt was [address] [facility] [employer] and [employer] telephone numbers. Pt reports that his truck is in the shop and he will call PCP concerning follow up. Will check on pt tomorrow concerning medications.

## 2021-02-25 NOTE — ED PROVIDER NOTES
EMERGENCY DEPARTMENT HISTORY AND PHYSICAL EXAM      Date: 2/24/2021  Patient Name: Lisseth Be    History of Presenting Illness     Chief Complaint   Patient presents with    Hand Pain     referred from the doctor to have his right hand swelling checked; he has had swelling four days and it is getting better; redness noted right hand especially the right second finger       History Provided By: Patient    HPI: Lisseth Be, 69 y.o. male presents to the ED with cc of hand pain. Pt to the ED by POV. He was referred to the ED by his PCP for evaluation. He states he was started on Bactrim for a presumed infxn of his right 2nd finger. He states he had noted improvement in swelling and erythema since starting the Ab. He states pain 9/10, throbbing ache worsened with palpation. The pain had been in the wrist as well, but this has improved as well. He states he is unaware of any recent injury or break in the skin. He denies any fever/chills. There has been no CP/SOA, abd pain, n/v/d. There are no other complaints, changes, or physical findings at this time. PCP: Gregorio Ramires MD    No current facility-administered medications on file prior to encounter. Current Outpatient Medications on File Prior to Encounter   Medication Sig Dispense Refill    ibuprofen (ADVIL) 200 mg tablet Take  by mouth.  tiotropium (SPIRIVA WITH HANDIHALER) 18 mcg inhalation capsule Take 1 Cap by inhalation daily.  fluticasone-salmeterol (ADVAIR DISKUS) 250-50 mcg/dose diskus inhaler Take 1 Puff by inhalation two (2) times a day.  gabapentin (NEURONTIN) 300 mg capsule Take 600 mg by mouth two (2) times a day.  ASPIRIN PO Take 81 mg by mouth daily.  clopidogrel (PLAVIX) 75 mg tab Take 75 mg by mouth daily.  pravastatin (PRAVACHOL) 40 mg tablet Take 40 mg by mouth daily.  furosemide (LASIX) 80 mg tablet Take 80 mg by mouth daily.       carvedilol (COREG) 6.25 mg tablet Take 6.25 mg by mouth two (2) times a day.  digoxin (LANOXIN) 0.125 mg tablet Take 0.125 mg by mouth daily.  folic acid (FOLVITE) 1 mg tablet Take 1 Tab by mouth daily. 30 Tab 11    potassium chloride SR (KLOR-CON 10) 10 mEq tablet Take 2 Tabs by mouth two (2) times a day.  61 Tab 11       Past History     Past Medical History:  Past Medical History:   Diagnosis Date    Atrial fibrillation ([facility]     CAD (coronary artery disease)     Cancer ([facility]     Colon CA  - polyp removed/chemo/radiation    Chronic obstructive pulmonary disease (HCC)     GERD (gastroesophageal reflux disease)     Heart failure (HCC)     Hypertension     Sleep apnea     not using machine because of surgery on tongue       Past Surgical History:  Past Surgical History:   Procedure Laterality Date    COLONOSCOPY N/A 2018    COLONOSCOPY performed by Valentin Chandler MD at [facility] ENDOSCOPY    COLONOSCOPY N/A 1/3/2020    COLONOSCOPY performed by Marquez Roman MD at [facility] ENDOSCOPY    COLONOSCOPY N/A 2020    COLONOSCOPY performed by Marquez Roman MD at [address] [place] [address] 2020    FLEXIBLE SIGMOIDOSCOPY WITH ARGON BEAM COAGULATION performed by Marquez Roman MD at [facility] ENDOSCOPY    HX AFIB ABLATION      HX GI      cancerous polyps removed  - pt usnure if done in OR or during colonoscopy    HX GI      colonoscopy x 2 - polyps both times    HX ORTHOPAEDIC      killed nerves in feet    VASCULAR SURGERY PROCEDURE UNLIST      stents legs bilaterally        Family History:  Family History   Problem Relation Age of Onset    Alcohol abuse Father     Cancer Father         lung    Cancer Brother         throat    Stroke Brother        Social History:  Social History     Tobacco Use    Smoking status: Former Smoker     Quit date: 3/1/2011     Years since quittin.9    Smokeless tobacco: Never Used   Substance Use Topics    Alcohol use: No    Drug use: No       Allergies:  No Known Allergies      Review of Systems   Review of Systems   Constitutional: Negative. Negative for appetite change, chills, fatigue and fever. HENT: Negative. Negative for congestion, rhinorrhea, sinus pressure and sore throat. Eyes: Negative. Respiratory: Negative. Negative for cough, choking, chest tightness, shortness of breath and wheezing. Cardiovascular: Negative. Negative for chest pain, palpitations and leg swelling. Gastrointestinal: Negative for abdominal pain, constipation, diarrhea, nausea and vomiting. Endocrine: Negative. Genitourinary: Negative. Negative for difficulty urinating, dysuria, flank pain and urgency. Musculoskeletal:        Right hand pain   Skin: Positive for color change (erythema and warmth of right hand). Neurological: Negative. Negative for dizziness, speech difficulty, weakness, light-headedness, numbness and headaches. Psychiatric/Behavioral: Negative. All other systems reviewed and are negative. Physical Exam   Physical Exam  Vitals signs and nursing note reviewed. Constitutional:       General: He is not in acute distress. Appearance: Normal appearance. He is not ill-appearing. HENT:      Head: Normocephalic and atraumatic. Mouth/Throat:      Mouth: Mucous membranes are moist.   Eyes:      Extraocular Movements: Extraocular movements intact. Conjunctiva/sclera: Conjunctivae normal.      Pupils: Pupils are equal, round, and reactive to light. Cardiovascular:      Rate and Rhythm: Normal rate and regular rhythm. Pulmonary:      Effort: Pulmonary effort is normal. No respiratory distress. Breath sounds: Normal breath sounds. No wheezing, rhonchi or rales. Musculoskeletal:        Hands:       Right lower leg: No edema. Left lower leg: No edema. Skin:     General: Skin is warm and dry. Capillary Refill: Capillary refill takes less than 2 seconds. Findings: Erythema (right hand ) present.    Neurological: Mental Status: He is alert and oriented to person, place, and time. Cranial Nerves: No cranial nerve deficit. Sensory: No sensory deficit. Motor: No weakness. Psychiatric:         Mood and Affect: Mood normal.         Behavior: Behavior normal.         Diagnostic Study Results     Labs -     Recent Results (from the past 12 hour(s))   CBC WITH AUTOMATED DIFF    Collection Time: 02/24/21  2:41 PM   Result Value Ref Range    WBC 5.6 4.1 - 11.1 K/uL    RBC 5.09 4. 10 - 5.70 M/uL    HGB 10.7 (L) 12.1 - 17.0 g/dL    HCT 36.4 (L) 36.6 - 50.3 %    MCV 71.5 (L) 80.0 - 99.0 FL    MCH 21.0 (L) 26.0 - 34.0 PG    MCHC 29.4 (L) 30.0 - 36.5 g/dL    RDW 19.8 (H) 11.5 - 14.5 %    PLATELET 232 305 - 121 K/uL    MPV 9.8 8.9 - 12.9 FL    NRBC 0.0 0  WBC    ABSOLUTE NRBC 0.00 0.00 - 0.01 K/uL    NEUTROPHILS 69 32 - 75 %    LYMPHOCYTES 16 12 - 49 %    MONOCYTES 8 5 - 13 %    EOSINOPHILS 6 0 - 7 %    BASOPHILS 1 0 - 1 %    IMMATURE GRANULOCYTES 0 0.0 - 0.5 %    ABS. NEUTROPHILS 3.9 1.8 - 8.0 K/UL    ABS. LYMPHOCYTES 0.9 0.8 - 3.5 K/UL    ABS. MONOCYTES 0.4 0.0 - 1.0 K/UL    ABS. EOSINOPHILS 0.3 0.0 - 0.4 K/UL    ABS. BASOPHILS 0.0 0.0 - 0.1 K/UL    ABS. IMM. GRANS. 0.0 0.00 - 0.04 K/UL    DF AUTOMATED     METABOLIC PANEL, COMPREHENSIVE    Collection Time: 02/24/21  2:41 PM   Result Value Ref Range    Sodium 134 (L) 136 - 145 mmol/L    Potassium 4.5 3.5 - 5.1 mmol/L    Chloride 98 97 - 108 mmol/L    CO2 32 21 - 32 mmol/L    Anion gap 4 (L) 5 - 15 mmol/L    Glucose 83 65 - 100 mg/dL    BUN 23 (H) 6 - 20 MG/DL    Creatinine 1.75 (H) 0.70 - 1.30 MG/DL    BUN/Creatinine ratio 13 12 - 20      GFR est AA 47 (L) >60 ml/min/1.73m2    GFR est non-AA 39 (L) >60 ml/min/1.73m2    Calcium 8.9 8.5 - 10.1 MG/DL    Bilirubin, total 0.5 0.2 - 1.0 MG/DL    ALT (SGPT) 12 12 - 78 U/L    AST (SGOT) 13 (L) 15 - 37 U/L    Alk.  phosphatase 96 45 - 117 U/L    Protein, total 7.4 6.4 - 8.2 g/dL    Albumin 3.8 3.5 - 5.0 g/dL    Globulin 3.6 2.0 - 4.0 g/dL    A-G Ratio 1.1 1.1 - 2.2     LACTIC ACID    Collection Time: 02/24/21  2:41 PM   Result Value Ref Range    Lactic acid 1.1 0.4 - 2.0 MMOL/L       Radiologic Studies -   XR HAND RT MIN 3 V   Final Result      Soft tissue swelling. No erosive change. CT Results  (Last 48 hours)    None        CXR Results  (Last 48 hours)    None          Medical Decision Making   I am the first provider for this patient. I reviewed the vital signs, available nursing notes, past medical history, past surgical history, family history and social history. Vital Signs-Reviewed the patient's vital signs. Patient Vitals for the past 12 hrs:   Pulse Resp BP SpO2   02/24/21 1552    93 %   02/24/21 1539 73 18 (!) 119/52 91 %     Records Reviewed: Nursing Notes, Old Medical Records, Previous Radiology Studies and Previous Laboratory Studies    Provider Notes (Medical Decision Making):   DDx- Abscess, Reactive arthritis, extensor tenosynovitis    ED Course:   Initial assessment performed. The patients presenting problems have been discussed, and they are in agreement with the care plan formulated and outlined with them. I have encouraged them to ask questions as they arise throughout their visit. Procedure Note  1700  Performed by me, Time, 5 minutes, <5ml blood. Area cleansed with Chlorprep. 0.5 ml Lidocaine 1% plain to radial side of finger at the base. #11 blade to make smal puncture, no pus drained but there was some blood drained and pt felt improvement. No packing place. Sterile dressing placed. Disposition:  DC, Rx for Keflex to start in addition to the Bactrim. DISCHARGE PLAN:  1. Discharge Medication List as of 2/24/2021  5:03 PM      START taking these medications    Details   trimethoprim-sulfamethoxazole (Bactrim DS) 160-800 mg per tablet Take 1 Tab by mouth two (2) times a day for 7 days. , Print, Disp-14 Tab, R-0      cephALEXin (Keflex) 500 mg capsule Take 1 Cap by mouth three (3) times daily for 7 days. , Normal, Disp-21 Cap, R-0         CONTINUE these medications which have NOT CHANGED    Details   ibuprofen (ADVIL) 200 mg tablet Take  by mouth., Historical Med      tiotropium (SPIRIVA WITH HANDIHALER) 18 mcg inhalation capsule Take 1 Cap by inhalation daily. , Historical Med      fluticasone-salmeterol (ADVAIR DISKUS) 250-50 mcg/dose diskus inhaler Take 1 Puff by inhalation two (2) times a day., Historical Med      gabapentin (NEURONTIN) 300 mg capsule Take 600 mg by mouth two (2) times a day., Historical Med      ASPIRIN PO Take 81 mg by mouth daily. , Historical Med      clopidogrel (PLAVIX) 75 mg tab Take 75 mg by mouth daily. , Historical Med      pravastatin (PRAVACHOL) 40 mg tablet Take 40 mg by mouth daily. , Historical Med      furosemide (LASIX) 80 mg tablet Take 80 mg by mouth daily. , Historical Med      carvedilol (COREG) 6.25 mg tablet Take 6.25 mg by mouth two (2) times a day., Historical Med      digoxin (LANOXIN) 0.125 mg tablet Take 0.125 mg by mouth daily. , Historical Med      folic acid (FOLVITE) 1 mg tablet Take 1 Tab by mouth daily. , Print, Disp-30 Tab, R-11      potassium chloride SR (KLOR-CON 10) 10 mEq tablet Take 2 Tabs by mouth two (2) times a day., Print, Disp-60 Tab, R-11           2. Follow-up Information     Follow up With Specialties Details Why Contact Info    Clair Estevez MD [facility]   [address]  [id number], [address] Nano Barber MD Hand Surgery   [address] [place] [address]  [address]  [address] [facility][1]  [phone]          3. Return to ED if worse     Diagnosis     Clinical Impression:   1. Finger infection    2. Cellulitis of right upper extremity        Attestations:    Claude Agee, DO    Please note that this dictation was completed with [facility], the [employer] voice recognition software.   Quite often unanticipated grammatical, syntax, homophones, and other interpretive errors are inadvertently transcribed by the computer software. Please disregard these errors. Please excuse any errors that have escaped final proofreading. Thank you.

## 2021-02-26 ENCOUNTER — PATIENT OUTREACH (OUTPATIENT)
Dept: CASE MANAGEMENT | Age: 71
End: 2021-02-26

## 2021-02-26 NOTE — PROGRESS NOTES
Called pt to follow up on ED visit to [address] LVM stating my name, CTN with [facility] and included directions from ED physician regarding Bactrim. Will call pt later today. Pt was to call PCP yesterday to follow up on abx.

## 2021-03-01 ENCOUNTER — PATIENT OUTREACH (OUTPATIENT)
Dept: CASE MANAGEMENT | Age: 71
End: 2021-03-01

## 2021-03-01 LAB
BACTERIA SPEC CULT: NORMAL
SERVICE CMNT-IMP: NORMAL

## 2021-03-01 NOTE — PROGRESS NOTES
Called pt to follow up on ED visit to [id number] [place] [address] on 2/24/2021. LVM stating my name, CTN calling on behalf of [employer], date and time of call and my return telephone number.

## 2021-03-11 ENCOUNTER — PATIENT OUTREACH (OUTPATIENT)
Dept: CASE MANAGEMENT | Age: 71
End: 2021-03-11

## 2021-03-11 NOTE — PROGRESS NOTES
Called pt to follow up on ED visit.  Pt verified name and .  Pt reports that finger is \"mostly\" healed and that he has completed abx.  Pt advised that he needs to follow up with PCP.  I offered to call PCP office for pt and pt states that he will do this.  Pt is agreeable to a follow up call next week to check on him.

## 2021-03-16 ENCOUNTER — PATIENT OUTREACH (OUTPATIENT)
Dept: CASE MANAGEMENT | Age: 71
End: 2021-03-16

## 2021-03-16 NOTE — PROGRESS NOTES
Patient resolved from [address] Transitions episode on 3/16/2021. Discussed COVID-19 related testing which was not done at this time. Test results were not done. Patient informed of results, if available? NA     Patient/family has been provided the following resources and education related to COVID-19:                         Signs, symptoms and red flags related to COVID-19            CDC exposure and quarantine guidelines            [employer] exposure contact - [phone]            Contact for their local Department of Health                 Patient currently reports that the following symptoms have improved:  no new symptoms and no worsening symptoms. Pt reports that he has called PCP concerning his infected finger. Pt reports that it is improving but infection is not totally gone. Pt reminded to continue to follow up with PCP so that infection does not spread. .    No further outreach scheduled with this CTN/ACM/LPN/HC/ MA. Episode of Care resolved. Patient has this CTN/ACM/LPN/HC/MA contact information if future needs arise.

## 2021-03-20 ENCOUNTER — PATIENT OUTREACH (OUTPATIENT)
Dept: CASE MANAGEMENT | Age: 71
End: 2021-03-20

## 2021-03-20 NOTE — PROGRESS NOTES
Pt called to ask if he was to get the covid19 vaccine today. Unable to locate information for pt. Pt verified name and . Pt advised that he can register through [facility] or call PCP on Monday. Pt reports that he called PCP office today and that he had lost his notebook with appts. Pt reports that he still has infection in his finger. Pt advised to check with PCP on Monday. Will follow up.

## 2021-03-24 ENCOUNTER — PATIENT OUTREACH (OUTPATIENT)
Dept: CASE MANAGEMENT | Age: 71
End: 2021-03-24

## 2021-03-24 NOTE — PROGRESS NOTES
Called pt to follow up on COVID19 vaccine. Pt verified name and  and reports that he has called PCP office and they are to call him back. Pt asked about long term effects of vaccine and pt was told to discuss this with PCP and [facility] web site might have information concerning long term effects of vaccine versus COVID19 infection. Pt states appreciation for the call.

## 2021-08-19 ENCOUNTER — APPOINTMENT (OUTPATIENT)
Dept: GENERAL RADIOLOGY | Age: 71
DRG: 193 | End: 2021-08-19
Attending: EMERGENCY MEDICINE
Payer: MEDICARE

## 2021-08-19 ENCOUNTER — APPOINTMENT (OUTPATIENT)
Dept: CT IMAGING | Age: 71
DRG: 193 | End: 2021-08-19
Attending: EMERGENCY MEDICINE
Payer: MEDICARE

## 2021-08-19 ENCOUNTER — HOSPITAL ENCOUNTER (INPATIENT)
Age: 71
LOS: 5 days | Discharge: HOME HEALTH | DRG: 193 | End: 2021-08-24
Attending: EMERGENCY MEDICINE | Admitting: HOSPITALIST
Payer: MEDICARE

## 2021-08-19 DIAGNOSIS — L03.114 CELLULITIS OF LEFT HAND: ICD-10-CM

## 2021-08-19 DIAGNOSIS — J18.9 COMMUNITY ACQUIRED PNEUMONIA OF RIGHT MIDDLE LOBE OF LUNG: ICD-10-CM

## 2021-08-19 DIAGNOSIS — R19.7 DIARRHEA, UNSPECIFIED TYPE: ICD-10-CM

## 2021-08-19 DIAGNOSIS — J96.01 ACUTE RESPIRATORY FAILURE WITH HYPOXIA (HCC): Primary | ICD-10-CM

## 2021-08-19 LAB
ALBUMIN SERPL-MCNC: 3.2 G/DL (ref 3.5–5)
ALBUMIN/GLOB SERPL: 0.8 {RATIO} (ref 1.1–2.2)
ALP SERPL-CCNC: 60 U/L (ref 45–117)
ALT SERPL-CCNC: 11 U/L (ref 12–78)
ANION GAP SERPL CALC-SCNC: 4 MMOL/L (ref 5–15)
APPEARANCE UR: CLEAR
AST SERPL-CCNC: 22 U/L (ref 15–37)
ATRIAL RATE: 82 BPM
BACTERIA URNS QL MICRO: NEGATIVE /HPF
BASE EXCESS BLD CALC-SCNC: 1.3 MMOL/L
BASOPHILS # BLD: 0.1 K/UL (ref 0–0.1)
BASOPHILS NFR BLD: 1 % (ref 0–1)
BILIRUB SERPL-MCNC: 1.2 MG/DL (ref 0.2–1)
BILIRUB UR QL: NEGATIVE
BNP SERPL-MCNC: 6499 PG/ML
BUN SERPL-MCNC: 18 MG/DL (ref 6–20)
BUN/CREAT SERPL: 14 (ref 12–20)
CA-I BLD-MCNC: 1.14 MMOL/L (ref 1.12–1.32)
CALCIUM SERPL-MCNC: 8.8 MG/DL (ref 8.5–10.1)
CALCULATED R AXIS, ECG10: 114 DEGREES
CALCULATED T AXIS, ECG11: -27 DEGREES
CHLORIDE BLD-SCNC: 100 MMOL/L (ref 100–108)
CHLORIDE SERPL-SCNC: 104 MMOL/L (ref 97–108)
CO2 BLD-SCNC: 27 MMOL/L (ref 19–24)
CO2 SERPL-SCNC: 28 MMOL/L (ref 21–32)
COLOR UR: ABNORMAL
COMMENT, HOLDF: NORMAL
COVID-19 RAPID TEST, COVR: NOT DETECTED
CREAT SERPL-MCNC: 1.3 MG/DL (ref 0.7–1.3)
CREAT UR-MCNC: 1.2 MG/DL (ref 0.6–1.3)
D DIMER PPP FEU-MCNC: 1.58 MG/L FEU (ref 0–0.65)
DIAGNOSIS, 93000: NORMAL
DIFFERENTIAL METHOD BLD: ABNORMAL
DIGOXIN SERPL-MCNC: 0.6 NG/ML (ref 0.9–2)
EOSINOPHIL # BLD: 0.2 K/UL (ref 0–0.4)
EOSINOPHIL NFR BLD: 4 % (ref 0–7)
EPITH CASTS URNS QL MICRO: ABNORMAL /LPF
ERYTHROCYTE [DISTWIDTH] IN BLOOD BY AUTOMATED COUNT: 22.5 % (ref 11.5–14.5)
ERYTHROCYTE [SEDIMENTATION RATE] IN BLOOD: 17 MM/HR (ref 0–20)
GLOBULIN SER CALC-MCNC: 3.8 G/DL (ref 2–4)
GLUCOSE BLD STRIP.AUTO-MCNC: 99 MG/DL (ref 74–106)
GLUCOSE SERPL-MCNC: 94 MG/DL (ref 65–100)
GLUCOSE UR STRIP.AUTO-MCNC: NEGATIVE MG/DL
HCO3 BLDA-SCNC: 27 MMOL/L
HCT VFR BLD AUTO: 34.3 % (ref 36.6–50.3)
HGB BLD-MCNC: 10 G/DL (ref 12.1–17)
HGB UR QL STRIP: NEGATIVE
HYALINE CASTS URNS QL MICRO: ABNORMAL /LPF (ref 0–5)
IMM GRANULOCYTES # BLD AUTO: 0 K/UL (ref 0–0.04)
IMM GRANULOCYTES NFR BLD AUTO: 0 % (ref 0–0.5)
KETONES UR QL STRIP.AUTO: NEGATIVE MG/DL
LACTATE BLD-SCNC: 1.78 MMOL/L (ref 0.4–2)
LEUKOCYTE ESTERASE UR QL STRIP.AUTO: NEGATIVE
LYMPHOCYTES # BLD: 0.8 K/UL (ref 0.8–3.5)
LYMPHOCYTES NFR BLD: 16 % (ref 12–49)
MCH RBC QN AUTO: 20.8 PG (ref 26–34)
MCHC RBC AUTO-ENTMCNC: 29.2 G/DL (ref 30–36.5)
MCV RBC AUTO: 71.3 FL (ref 80–99)
MONOCYTES # BLD: 0.6 K/UL (ref 0–1)
MONOCYTES NFR BLD: 12 % (ref 5–13)
NEUTS SEG # BLD: 3.2 K/UL (ref 1.8–8)
NEUTS SEG NFR BLD: 67 % (ref 32–75)
NITRITE UR QL STRIP.AUTO: NEGATIVE
NRBC # BLD: 0 K/UL (ref 0–0.01)
NRBC BLD-RTO: 0 PER 100 WBC
PCO2 BLDV: 45.5 MMHG (ref 41–51)
PH BLDV: 7.38 [PH] (ref 7.32–7.42)
PH UR STRIP: 5.5 [PH] (ref 5–8)
PLATELET # BLD AUTO: 223 K/UL (ref 150–400)
PMV BLD AUTO: 10.6 FL (ref 8.9–12.9)
PO2 BLDV: 29 MMHG (ref 25–40)
POTASSIUM BLD-SCNC: 3.8 MMOL/L (ref 3.5–5.5)
POTASSIUM SERPL-SCNC: 3.9 MMOL/L (ref 3.5–5.1)
PROT SERPL-MCNC: 7 G/DL (ref 6.4–8.2)
PROT UR STRIP-MCNC: ABNORMAL MG/DL
Q-T INTERVAL, ECG07: 384 MS
QRS DURATION, ECG06: 122 MS
QTC CALCULATION (BEZET), ECG08: 442 MS
RBC # BLD AUTO: 4.81 M/UL (ref 4.1–5.7)
RBC #/AREA URNS HPF: ABNORMAL /HPF (ref 0–5)
RBC MORPH BLD: ABNORMAL
SAMPLES BEING HELD,HOLD: NORMAL
SARS-COV-2, COV2: NORMAL
SERVICE CMNT-IMP: ABNORMAL
SODIUM BLD-SCNC: 137 MMOL/L (ref 136–145)
SODIUM SERPL-SCNC: 136 MMOL/L (ref 136–145)
SOURCE, COVRS: NORMAL
SP GR UR REFRACTOMETRY: 1.01 (ref 1–1.03)
SPECIMEN SITE: ABNORMAL
TROPONIN I SERPL-MCNC: <0.05 NG/ML
UA: UC IF INDICATED,UAUC: ABNORMAL
URATE SERPL-MCNC: 9.1 MG/DL (ref 3.5–7.2)
UROBILINOGEN UR QL STRIP.AUTO: 1 EU/DL (ref 0.2–1)
VENTRICULAR RATE, ECG03: 80 BPM
WBC # BLD AUTO: 4.9 K/UL (ref 4.1–11.1)
WBC URNS QL MICRO: ABNORMAL /HPF (ref 0–4)

## 2021-08-19 PROCEDURE — 85379 FIBRIN DEGRADATION QUANT: CPT

## 2021-08-19 PROCEDURE — 74011000258 HC RX REV CODE- 258: Performed by: HOSPITALIST

## 2021-08-19 PROCEDURE — 87040 BLOOD CULTURE FOR BACTERIA: CPT

## 2021-08-19 PROCEDURE — 80053 COMPREHEN METABOLIC PANEL: CPT

## 2021-08-19 PROCEDURE — 87506 IADNA-DNA/RNA PROBE TQ 6-11: CPT

## 2021-08-19 PROCEDURE — 87635 SARS-COV-2 COVID-19 AMP PRB: CPT

## 2021-08-19 PROCEDURE — 83880 ASSAY OF NATRIURETIC PEPTIDE: CPT

## 2021-08-19 PROCEDURE — 89055 LEUKOCYTE ASSESSMENT FECAL: CPT

## 2021-08-19 PROCEDURE — 81001 URINALYSIS AUTO W/SCOPE: CPT

## 2021-08-19 PROCEDURE — 74011250636 HC RX REV CODE- 250/636: Performed by: HOSPITALIST

## 2021-08-19 PROCEDURE — 73130 X-RAY EXAM OF HAND: CPT

## 2021-08-19 PROCEDURE — 99285 EMERGENCY DEPT VISIT HI MDM: CPT

## 2021-08-19 PROCEDURE — 74177 CT ABD & PELVIS W/CONTRAST: CPT

## 2021-08-19 PROCEDURE — 87324 CLOSTRIDIUM AG IA: CPT

## 2021-08-19 PROCEDURE — 84295 ASSAY OF SERUM SODIUM: CPT

## 2021-08-19 PROCEDURE — 71045 X-RAY EXAM CHEST 1 VIEW: CPT

## 2021-08-19 PROCEDURE — U0005 INFEC AGEN DETEC AMPLI PROBE: HCPCS

## 2021-08-19 PROCEDURE — 77010033678 HC OXYGEN DAILY

## 2021-08-19 PROCEDURE — 36415 COLL VENOUS BLD VENIPUNCTURE: CPT

## 2021-08-19 PROCEDURE — 84484 ASSAY OF TROPONIN QUANT: CPT

## 2021-08-19 PROCEDURE — 84550 ASSAY OF BLOOD/URIC ACID: CPT

## 2021-08-19 PROCEDURE — 74011250637 HC RX REV CODE- 250/637: Performed by: EMERGENCY MEDICINE

## 2021-08-19 PROCEDURE — 96365 THER/PROPH/DIAG IV INF INIT: CPT

## 2021-08-19 PROCEDURE — 65660000000 HC RM CCU STEPDOWN

## 2021-08-19 PROCEDURE — 74011000258 HC RX REV CODE- 258: Performed by: EMERGENCY MEDICINE

## 2021-08-19 PROCEDURE — 74011250636 HC RX REV CODE- 250/636: Performed by: EMERGENCY MEDICINE

## 2021-08-19 PROCEDURE — 74011250637 HC RX REV CODE- 250/637: Performed by: HOSPITALIST

## 2021-08-19 PROCEDURE — 96375 TX/PRO/DX INJ NEW DRUG ADDON: CPT

## 2021-08-19 PROCEDURE — 86140 C-REACTIVE PROTEIN: CPT

## 2021-08-19 PROCEDURE — 93005 ELECTROCARDIOGRAM TRACING: CPT

## 2021-08-19 PROCEDURE — 85652 RBC SED RATE AUTOMATED: CPT

## 2021-08-19 PROCEDURE — 74011000636 HC RX REV CODE- 636: Performed by: EMERGENCY MEDICINE

## 2021-08-19 PROCEDURE — 80162 ASSAY OF DIGOXIN TOTAL: CPT

## 2021-08-19 PROCEDURE — 71275 CT ANGIOGRAPHY CHEST: CPT

## 2021-08-19 PROCEDURE — 85025 COMPLETE CBC W/AUTO DIFF WBC: CPT

## 2021-08-19 RX ORDER — POTASSIUM CHLORIDE 750 MG/1
20 TABLET, FILM COATED, EXTENDED RELEASE ORAL 2 TIMES DAILY
Status: DISCONTINUED | OUTPATIENT
Start: 2021-08-19 | End: 2021-08-24 | Stop reason: HOSPADM

## 2021-08-19 RX ORDER — FOLIC ACID 1 MG/1
1 TABLET ORAL DAILY
Status: DISCONTINUED | OUTPATIENT
Start: 2021-08-20 | End: 2021-08-24 | Stop reason: HOSPADM

## 2021-08-19 RX ORDER — GABAPENTIN 300 MG/1
600 CAPSULE ORAL 2 TIMES DAILY
Status: DISCONTINUED | OUTPATIENT
Start: 2021-08-19 | End: 2021-08-24 | Stop reason: HOSPADM

## 2021-08-19 RX ORDER — LEVOFLOXACIN 5 MG/ML
750 INJECTION, SOLUTION INTRAVENOUS
Status: COMPLETED | OUTPATIENT
Start: 2021-08-19 | End: 2021-08-19

## 2021-08-19 RX ORDER — CLOPIDOGREL BISULFATE 75 MG/1
75 TABLET ORAL DAILY
Status: DISCONTINUED | OUTPATIENT
Start: 2021-08-19 | End: 2021-08-24 | Stop reason: HOSPADM

## 2021-08-19 RX ORDER — DOXYCYCLINE HYCLATE 100 MG
100 TABLET ORAL
Status: COMPLETED | OUTPATIENT
Start: 2021-08-19 | End: 2021-08-19

## 2021-08-19 RX ORDER — FLUTICASONE FUROATE AND VILANTEROL 200; 25 UG/1; UG/1
1 POWDER RESPIRATORY (INHALATION) DAILY
Status: DISCONTINUED | OUTPATIENT
Start: 2021-08-20 | End: 2021-08-24 | Stop reason: HOSPADM

## 2021-08-19 RX ORDER — ONDANSETRON 4 MG/1
4 TABLET, ORALLY DISINTEGRATING ORAL
Status: DISCONTINUED | OUTPATIENT
Start: 2021-08-19 | End: 2021-08-24 | Stop reason: HOSPADM

## 2021-08-19 RX ORDER — ONDANSETRON 2 MG/ML
4 INJECTION INTRAMUSCULAR; INTRAVENOUS
Status: DISCONTINUED | OUTPATIENT
Start: 2021-08-19 | End: 2021-08-24 | Stop reason: HOSPADM

## 2021-08-19 RX ORDER — FUROSEMIDE 40 MG/1
80 TABLET ORAL DAILY
Status: DISCONTINUED | OUTPATIENT
Start: 2021-08-19 | End: 2021-08-22

## 2021-08-19 RX ORDER — POLYETHYLENE GLYCOL 3350 17 G/17G
17 POWDER, FOR SOLUTION ORAL DAILY PRN
Status: DISCONTINUED | OUTPATIENT
Start: 2021-08-19 | End: 2021-08-24 | Stop reason: HOSPADM

## 2021-08-19 RX ORDER — ACETAMINOPHEN 325 MG/1
650 TABLET ORAL
Status: DISCONTINUED | OUTPATIENT
Start: 2021-08-19 | End: 2021-08-24 | Stop reason: HOSPADM

## 2021-08-19 RX ORDER — CARVEDILOL 3.12 MG/1
6.25 TABLET ORAL 2 TIMES DAILY
Status: DISCONTINUED | OUTPATIENT
Start: 2021-08-19 | End: 2021-08-24 | Stop reason: HOSPADM

## 2021-08-19 RX ORDER — SODIUM CHLORIDE 0.9 % (FLUSH) 0.9 %
5-40 SYRINGE (ML) INJECTION EVERY 8 HOURS
Status: DISCONTINUED | OUTPATIENT
Start: 2021-08-19 | End: 2021-08-24 | Stop reason: HOSPADM

## 2021-08-19 RX ORDER — PRAVASTATIN SODIUM 40 MG/1
40 TABLET ORAL DAILY
Status: DISCONTINUED | OUTPATIENT
Start: 2021-08-20 | End: 2021-08-24 | Stop reason: HOSPADM

## 2021-08-19 RX ORDER — ENOXAPARIN SODIUM 100 MG/ML
40 INJECTION SUBCUTANEOUS DAILY
Status: DISCONTINUED | OUTPATIENT
Start: 2021-08-20 | End: 2021-08-24 | Stop reason: HOSPADM

## 2021-08-19 RX ORDER — DIGOXIN 125 MCG
0.12 TABLET ORAL DAILY
Status: DISCONTINUED | OUTPATIENT
Start: 2021-08-19 | End: 2021-08-24 | Stop reason: HOSPADM

## 2021-08-19 RX ORDER — DIPHENHYDRAMINE HYDROCHLORIDE 50 MG/ML
25 INJECTION, SOLUTION INTRAMUSCULAR; INTRAVENOUS
Status: COMPLETED | OUTPATIENT
Start: 2021-08-19 | End: 2021-08-19

## 2021-08-19 RX ORDER — SODIUM CHLORIDE 0.9 % (FLUSH) 0.9 %
5-40 SYRINGE (ML) INJECTION AS NEEDED
Status: DISCONTINUED | OUTPATIENT
Start: 2021-08-19 | End: 2021-08-24 | Stop reason: HOSPADM

## 2021-08-19 RX ORDER — ASPIRIN 81 MG/1
81 TABLET ORAL DAILY
Status: DISCONTINUED | OUTPATIENT
Start: 2021-08-19 | End: 2021-08-24 | Stop reason: HOSPADM

## 2021-08-19 RX ORDER — ACETAMINOPHEN 650 MG/1
660 SUPPOSITORY RECTAL
Status: DISCONTINUED | OUTPATIENT
Start: 2021-08-19 | End: 2021-08-24 | Stop reason: HOSPADM

## 2021-08-19 RX ORDER — BETAMETHASONE DIPROPIONATE 0.5 MG/G
CREAM TOPICAL
COMMUNITY

## 2021-08-19 RX ADMIN — LEVOFLOXACIN 750 MG: 5 INJECTION, SOLUTION INTRAVENOUS at 11:39

## 2021-08-19 RX ADMIN — DOXYCYCLINE HYCLATE 100 MG: 100 TABLET, COATED ORAL at 12:57

## 2021-08-19 RX ADMIN — GABAPENTIN 600 MG: 300 CAPSULE ORAL at 17:27

## 2021-08-19 RX ADMIN — IOPAMIDOL 100 ML: 755 INJECTION, SOLUTION INTRAVENOUS at 12:23

## 2021-08-19 RX ADMIN — ASPIRIN 81 MG: 81 TABLET, COATED ORAL at 17:26

## 2021-08-19 RX ADMIN — DIPHENHYDRAMINE HYDROCHLORIDE 25 MG: 50 INJECTION, SOLUTION INTRAMUSCULAR; INTRAVENOUS at 11:51

## 2021-08-19 RX ADMIN — CEFTRIAXONE SODIUM 2 G: 2 INJECTION, POWDER, FOR SOLUTION INTRAMUSCULAR; INTRAVENOUS at 12:57

## 2021-08-19 RX ADMIN — DIGOXIN 0.12 MG: 125 TABLET ORAL at 17:28

## 2021-08-19 RX ADMIN — POTASSIUM CHLORIDE 20 MEQ: 750 TABLET, FILM COATED, EXTENDED RELEASE ORAL at 17:25

## 2021-08-19 RX ADMIN — Medication 10 ML: at 21:07

## 2021-08-19 RX ADMIN — ACETAMINOPHEN 650 MG: 325 TABLET ORAL at 17:55

## 2021-08-19 RX ADMIN — Medication 10 ML: at 16:00

## 2021-08-19 RX ADMIN — DOXYCYCLINE 100 MG: 100 INJECTION, POWDER, LYOPHILIZED, FOR SOLUTION INTRAVENOUS at 21:08

## 2021-08-19 RX ADMIN — CLOPIDOGREL BISULFATE 75 MG: 75 TABLET ORAL at 17:30

## 2021-08-19 RX ADMIN — FUROSEMIDE 80 MG: 40 TABLET ORAL at 17:26

## 2021-08-19 RX ADMIN — CARVEDILOL 6.25 MG: 6.25 TABLET, FILM COATED ORAL at 17:24

## 2021-08-19 NOTE — ED NOTES
Pt 82% on room air upon arrival. Pt placed on 2L NC and now 92%. Pt has hx of COPD. Denies any o2 at home.

## 2021-08-19 NOTE — PROGRESS NOTES
Problem: Falls - Risk of  Goal: *Absence of Falls  Description: Document Carole Dutta Fall Risk and appropriate interventions in the flowsheet. 8/19/2021 1553 by Christel Cuevas  Outcome: Progressing Towards Goal  Note: Fall Risk Interventions:                             8/19/2021 1516 by Christel Cuevas  Outcome: Progressing Towards Goal  Note: Fall Risk Interventions:                                Problem: Risk for Spread of Infection  Goal: Prevent transmission of infectious organism to others  Description: Prevent the transmission of infectious organisms to other patients, staff members, and visitors.   Outcome: Progressing Towards Goal

## 2021-08-19 NOTE — CONSULTS
admitted to medicine with Pneumonia and AMS. Left elbow and thumb pain noted on admit. injury   Noted. Thumb c/o swelling and redness over mp. No streaking. .  Denies fevers. Wbc normal    Patient Vitals for the past 12 hrs:   Temp Pulse Resp BP SpO2   08/19/21 1549  74      08/19/21 1520     96 %   08/19/21 1512 98.4 °F (36.9 °C) 74 20 126/63 96 %   08/19/21 1430 98.1 °F (36.7 °C) 81 18 (!) 141/68 92 %   08/19/21 1345  97 19 (!) 113/59 92 %   08/19/21 1250  82 28 127/60 94 %   08/19/21 1102  76 24  95 %   08/19/21 1100  81 19 (!) 165/73    08/19/21 1045  72 13 (!) 147/63    08/19/21 1016  78 22 (!) 174/69 95 %   08/19/21 0957  94 22  92 %   08/19/21 0954    123/64    08/19/21 0950 98.8 °F (37.1 °C) 86 22 123/64 (!) 82 %     Left thumb with erythema over 1st mp. rom 0.10 active with pain. Pain with palpation. All compartments soft. No pain or redness along tendons flexor or ext. Elbow rom short by 10 deg. 10- 90  TTP over radial head. No erythema or swelling. No signs of septic arthritis or tenosynovitis    MRI asap.  Needs to wait for covid PCR  Cold compress and strict elevation for now  Case discussed with DR Dane Lundberg with above

## 2021-08-19 NOTE — ED NOTES
After abx infusion started pt developed hives going up right arm. IV infusion stopped. MD notified and orders received.  Please see MAR

## 2021-08-19 NOTE — PROGRESS NOTES
Primary Nurse Dana Nogueira, LISETTE and Piedad Mckeon RN performed a dual skin assessment on this patient No impairment noted  Ignacio score is 20

## 2021-08-19 NOTE — H&P
Hospitalist Admission Note    NAME: Yumiko Salas   :  1950   MRN:  [id number]     Date/Time:  2021 12:02 PM    Patient PCP: Trey Pettit MD    Please note that this dictation was completed with REPUCOM, the computer voice recognition software. Quite often unanticipated grammatical, syntax, homophones, and other interpretive errors are inadvertently transcribed by the computer software. Please disregard these errors. Please excuse any errors that have escaped final proofreading  ______________________________________________________________________   Assessment & Plan:  Acute hypoxic respiratory failure, POA due to  Right sided community acquired pneumonia, POA  --rapid covid negative; covid-19 pcr pending. Has been vaccinated with Moderna vaccine  --continue doxycycline and rocephin  --supplemental O2 2L  --developed hives to levaquin in ER, treated with Benadryl  CTA chest negative PE, +RML consolidation and patchy opacification in RUL. Left thumb cellulitis, POA with suspected tenosynovitis  Left olecranon/elbow swelling and pain with decreased ROM  --xray left hand with soft tissue swelling; denies any injury  --IV abx, check uric acid, CRP, ESR, orthopedic consult. Patient discussed with Brian Leach and requested MRI left upper extremity from hand to elbow. --order MRI once patient off droplet plus isolation. Diarrhea, POA  Rule out C. diff  --recently on z-pack on  by pcp  --ordered C. Diff, stool culture. Had 2 diarrhea in ER by patient report.  --CT abdomen negative    Copd, without exac  --continue advair and spiriva    Chronic systolic chf EF 17-46%   Trace b/l leg edema, R calf larger than left  HTN  CAD  --continue lasix, potassium, digoxin  --continue aspirin, plavix, statin, coreg,    PAD hx right SFA stent 2014    Persistent afib  --rate controlled.   Continue aspirin/plavix  --continue digoxin, check level    CKD stage 3  --Cr 1.3, was 1.75 in 2/21.  Baseline unkown, prior Cr 1-1.1 in 2014  --monitor. Received IV contrast today  --check UA    Neuropathy  --continue gabapentin    Colon cancer 2014  --follow with Dr. 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 Code:  full  DVT prophylaxis: lovenox  Surrogate decision maker:  daughterJanice Benson          Subjective:   CHIEF COMPLAINT:  Disorientation, left arm swelling, left thumb swelling and pain    HISTORY OF PRESENT ILLNESS:     Renee Lr is a 69 y.o. male sent from PCP's office with concern for sepsis. He reports feeling delirious and out of it for 2 days, had fever 100.7 at home, swelling redness and pain in left thumb for 3 days, denies any injury; denies any hx of gout. He reports nonproductive cough and some SOB. Also been having pain in ankles and feet. Bilateral leg edema is chronic and unchanged; says right leg always more swollen than left. Denies abdominal pain, n/v.  +diarrhea for 2 days (he could not remember frequency; told ER 30-40 episodes). Has lost 100 lbs over past year; low appetite. Reports he's had 2 diarrhea in ER today. We were asked to admit for work up and evaluation of the above problems.      Past Medical History:   Diagnosis Date    [facility] fibrillation [facility]     CAD (coronary artery disease)     Cancer ([facility] [facility]     Colon CA 2014 - polyp removed/chemo/radiation    Chronic obstructive pulmonary disease (HCC)     GERD (gastroesophageal reflux disease)     Heart failure (HCC)     Hypertension     Sleep apnea     not using machine because of surgery on tongue      Past Surgical History:   Procedure Laterality Date    COLONOSCOPY N/A 9/21/2018    COLONOSCOPY performed by Lilian Sims MD at [address] COLONOSCOPY N/A 1/3/2020    COLONOSCOPY performed by Kevin Brown MD at [address] COLONOSCOPY N/A 2/7/2020    COLONOSCOPY performed by Brooke Andrade MD at [address] Left 5/29/2020    FLEXIBLE SIGMOIDOSCOPY WITH ARGON BEAM COAGULATION performed by Brooke Andrade MD at [facility]    HX AFIB ABLATION      HX GI      cancerous polyps removed  - pt usnure if done in OR or during colonoscopy    HX GI      colonoscopy x 2 - polyps both times    HX ORTHOPAEDIC      killed nerves in feet    VASCULAR SURGERY PROCEDURE UNLIST      stents legs bilaterally 2014/2015     Social History     Tobacco Use    Smoking status: Former Smoker     Quit date: 3/1/2011     Years since quitting: 10.4    Smokeless tobacco: Never Used   Substance Use Topics    Alcohol use: No      Lives alone, uses cane. Denies drug use    Family History   Problem Relation Age of Onset    Alcohol abuse Father     Cancer Father         lung    Cancer Brother         throat    Stroke Brother      Allergies   Allergen Reactions    Levaquin [Levofloxacin] Hives        Prior to Admission medications    Medication Sig Start Date End Date Taking? Authorizing Provider   ibuprofen (ADVIL) 200 mg tablet Take  by mouth. Provider, Historical   tiotropium (SPIRIVA WITH HANDIHALER) 18 mcg inhalation capsule Take 1 Cap by inhalation daily. Provider, Historical   fluticasone-salmeterol (ADVAIR DISKUS) 250-50 mcg/dose diskus inhaler Take 1 Puff by inhalation two (2) times a day. Provider, Historical   gabapentin (NEURONTIN) 300 mg capsule Take 600 mg by mouth two (2) times a day. Provider, Historical   ASPIRIN PO Take 81 mg by mouth daily. Provider, Historical   clopidogrel (PLAVIX) 75 mg tab Take 75 mg by mouth daily. Provider, Historical   pravastatin (PRAVACHOL) 40 mg tablet Take 40 mg by mouth daily. Provider, Historical   furosemide (LASIX) 80 mg tablet Take 80 mg by mouth daily. Provider, Historical   carvedilol (COREG) 6.25 mg tablet Take 6.25 mg by mouth two (2) times a day.     Provider, Historical   digoxin (LANOXIN) 0.125 mg tablet Take 0.125 mg by mouth daily. Provider, Historical   folic acid (FOLVITE) 1 mg tablet Take 1 Tab by mouth daily. 14   Marissa Taveras MD   potassium chloride SR (KLOR-CON 10) 10 mEq tablet Take 2 Tabs by mouth two (2) times a day. 14   Marissa Taveras MD     REVIEW OF SYSTEMS:  POSITIVE= Bold. Negative = normal text  General:  fever, chills, sweats, generalized weakness, weight loss/gain, loss of appetite  Eyes:  blurred vision, eye pain, loss of vision, diplopia  Ear Nose and Throat:  rhinorrhea, pharyngitis  Respiratory:   cough, sputum production, SOB, wheezing, RILEY, pleuritic pain  Cardiology:  chest pain, palpitations, orthopnea, PND, edema, syncope   Gastrointestinal:  abdominal pain, N/V, dysphagia, diarrhea, constipation, bleeding  Genitourinary:  frequency, urgency, dysuria, hematuria, incontinence  Muskuloskeletal :  arthralgia, myalgia  Hematology:  easy bruising, bleeding, lymphadenopathy  Dermatological:  rash, ulceration, pruritis  Endocrine:  hot flashes or polydipsia  Neurological:  headache, dizziness, confusion, focal weakness, paresthesia, memory loss, gait disturbance  Psychological: anxiety, depression, agitation      Objective:   VITALS:    Visit Vitals  BP (!) 165/73   Pulse 76   Temp 98.8 °F (37.1 °C)   Resp 24   Ht 5' 9\" (1.753 m)   Wt 87.1 kg (192 lb)   SpO2 95%   BMI 28.35 kg/m²     Temp (24hrs), Av.8 °F (37.1 °C), Min:98.8 °F (37.1 °C), Max:98.8 °F (37.1 °C)    Body mass index is 28.35 kg/m². PHYSICAL EXAM:    General:    Alert, cooperative, no distress, appears stated age. HEENT: Atraumatic, anicteric sclerae, pink conjunctivae     No oral ulcers, mucosa moist, throat clear. Hearing intact. Neck:  Supple, symmetrical,  thyroid: non tender  Lungs:   Coarse BS b/l. No Wheezing or Rhonchi. No rales. Chest wall:  No tenderness  No Accessory muscle use. Heart:   irregular  rhythm,  No  murmur   No gallop.   Trace edema on left; 1+ edema on right.  Abdomen:   Soft, non-tender. Not distended. Bowel sounds normal. No masses  Extremities: No cyanosis. No clubbing. Right calf larger than left. Left metacarpal joint dull red, swollen, very painful to touch, not hot; limited ROM left thumb;   with soft tissue swelling of hand. Left olecranon process swollen, painful to touch, not hot. Left forearm soft tissue swelling without pain. .   Skin:     Pale Not Jaundiced  Pink venous stasis changes in b/l lower legs without increased warmth. Psych:  Good insight. Not depressed. Not anxious or agitated. Neurologic: EOMs intact. No facial asymmetry. No aphasia or slurred speech. Symmetrical strength, Alert and oriented X 3. IMAGING RESULTS:   []       I have personally reviewed the actual   []     CXR  []     CT scan  CXR:    Impression:    Right basilar patchy airspace disease. Narrative:    INDICATION: Shortness of breath. Portable AP view of the chest.     Direct comparison made to prior chest x-ray dated 9/2019. Cardiomediastinal silhouette is stable. There is right basilar lung patchy air   space disease. The lung is clear. No pleural fluid is visualized. There is no   pneumothorax. CTA chest  CT abdomen/pelvis:  CT PULMONARY ARTERIOGRAM:     No pulmonary embolism is demonstrated. There is a small right pleural effusion. Small pericardial effusion is also shown. Cardiac size is upper limits of  normal. Small prevascular, aorticopulmonary, right paratracheal and subcarinal  lymph nodes appear similar to prior examination. No chest wall mass is  demonstrated. There is patchy consolidation in the right middle lobe. There is  also ill-defined groundglass opacification in the posterior segment of the right  upper lobe.     ABDOMEN AND PELVIS:     LIVER: No mass or intrahepatic bile duct dilation. BILIARY TREE: Gallbladder is within normal limits. CBD is not dilated. SPLEEN: within normal limits.   PANCREAS: No mass or ductal dilatation. ADRENALS: Unremarkable. KIDNEYS: No mass, calculus, or hydronephrosis. STOMACH: Unremarkable. SMALL BOWEL: No dilatation or wall thickening. COLON: No dilatation or wall thickening. APPENDIX: Not shown. PERITONEUM: There is trace free peritoneal fluid shown in the pelvis. RETROPERITONEUM: Abundant atherosclerotic calcifications. No aneurysm. No  retroperitoneal mass or adenopathy. URINARY BLADDER: No mass or calculus. BONES: 744  ABDOMINAL WALL: No mass or hernia. ADDITIONAL COMMENTS: N/A     IMPRESSION     1. No evidence for pulmonary embolism. 2. Small right pleural effusion. Small pericardial effusion. 3. Right middle lobe consolidation. Patchy groundglass opacification right upper  lobe. Infectious process is suspected. 4. Trace free peritoneal fluid. No other acute finding shown in abdomen and  pelvis.     EKG: afib   ________________________________________________________________________  Care Plan discussed with:    Comments   Patient y    Family      RN     Care Manager                    Consultant:  jacy Granados   ________________________________________________________________________  Prophylaxis:  GI none   DVT lovenox   ________________________________________________________________________  Recommended Disposition:   Home with Family y   HH/PT/OT/RN y   SNF/LTC    RON    ________________________________________________________________________  Code Status:  Full Code y   DNR/DNI    ________________________________________________________________________  TOTAL TIME:  50 minutes      Comments     Reviewed previous records   >50% of visit spent in counseling and coordination of care  Discussion with patient and/or family and questions answered         ______________________________________________________________________  Jarrod Ramirez MD      Procedures: see electronic medical records for all procedures/Xrays and details which were not copied into this note but were reviewed prior to creation of Plan. LAB DATA REVIEWED:    Recent Results (from the past 24 hour(s))   EKG, 12 LEAD, INITIAL    Collection Time: 08/19/21  9:58 AM   Result Value Ref Range    Ventricular Rate 80 BPM    Atrial Rate 82 BPM    QRS Duration 122 ms    Q-T Interval 384 ms    QTC Calculation (Bezet) 442 ms    Calculated R Axis 114 degrees    Calculated T Axis -27 degrees    Diagnosis       Atrial fibrillation with premature ventricular or aberrantly conducted   complexes  Right bundle branch block  Septal infarct , age undetermined  T wave abnormality, consider lateral ischemia  When compared with ECG of 19-DEC-2014 06:13,  premature ventricular complexes are no longer present  Right bundle branch block has replaced Incomplete right bundle branch block     TROPONIN I    Collection Time: 08/19/21 10:10 AM   Result Value Ref Range    Troponin-I, Qt. <0.05 <0.05 ng/mL   CBC WITH AUTOMATED DIFF    Collection Time: 08/19/21 10:10 AM   Result Value Ref Range    WBC 4.9 4.1 - 11.1 K/uL    RBC 4.81 4.10 - 5.70 M/uL    HGB 10.0 (L) 12.1 - 17.0 g/dL    HCT 34.3 (L) 36.6 - 50.3 %    MCV 71.3 (L) 80.0 - 99.0 FL    MCH 20.8 (L) 26.0 - 34.0 PG    MCHC 29.2 (L) 30.0 - 36.5 g/dL    RDW 22.5 (H) 11.5 - 14.5 %    PLATELET 551 057 - 177 K/uL    MPV 10.6 8.9 - 12.9 FL    NRBC 0.0 0  WBC    ABSOLUTE NRBC 0.00 0.00 - 0.01 K/uL    NEUTROPHILS 67 32 - 75 %    LYMPHOCYTES 16 12 - 49 %    MONOCYTES 12 5 - 13 %    EOSINOPHILS 4 0 - 7 %    BASOPHILS 1 0 - 1 %    IMMATURE GRANULOCYTES 0 0.0 - 0.5 %    ABS. NEUTROPHILS 3.2 1.8 - 8.0 K/UL    ABS. LYMPHOCYTES 0.8 0.8 - 3.5 K/UL    ABS. MONOCYTES 0.6 0.0 - 1.0 K/UL    ABS. EOSINOPHILS 0.2 0.0 - 0.4 K/UL    ABS. BASOPHILS 0.1 0.0 - 0.1 K/UL    ABS. IMM.  GRANS. 0.0 0.00 - 0.04 K/UL    DF SMEAR SCANNED      RBC COMMENTS MICROCYTOSIS  1+        RBC COMMENTS HYPOCHROMIA  1+        RBC COMMENTS POLYCHROMASIA  1+        RBC COMMENTS ANISOCYTOSIS  2+       METABOLIC PANEL, COMPREHENSIVE    Collection Time: 08/19/21 10:10 AM   Result Value Ref Range    Sodium 136 136 - 145 mmol/L    Potassium 3.9 3.5 - 5.1 mmol/L    Chloride 104 97 - 108 mmol/L    CO2 28 21 - 32 mmol/L    Anion gap 4 (L) 5 - 15 mmol/L    Glucose 94 65 - 100 mg/dL    BUN 18 6 - 20 MG/DL    Creatinine 1.30 0.70 - 1.30 MG/DL    BUN/Creatinine ratio 14 12 - 20      GFR est AA >60 >60 ml/min/1.73m2    GFR est non-AA 54 (L) >60 ml/min/1.73m2    Calcium 8.8 8.5 - 10.1 MG/DL    Bilirubin, total 1.2 (H) 0.2 - 1.0 MG/DL    ALT (SGPT) 11 (L) 12 - 78 U/L    AST (SGOT) 22 15 - 37 U/L    Alk. phosphatase 60 45 - 117 U/L    Protein, total 7.0 6.4 - 8.2 g/dL    Albumin 3.2 (L) 3.5 - 5.0 g/dL    Globulin 3.8 2.0 - 4.0 g/dL    A-G Ratio 0.8 (L) 1.1 - 2.2     NT-PRO BNP    Collection Time: 08/19/21 10:10 AM   Result Value Ref Range    NT pro-BNP 6,499 (H) <125 PG/ML   D DIMER    Collection Time: 08/19/21 10:12 AM   Result Value Ref Range    D-dimer 1.58 (H) 0.00 - 0.65 mg/L FEU   SAMPLES BEING HELD    Collection Time: 08/19/21 10:12 AM   Result Value Ref Range    SAMPLES BEING HELD RED,SST     COMMENT        Add-on orders for these samples will be processed based on acceptable specimen integrity and analyte stability, which may vary by analyte.    BLOOD GAS,CHEM8,LACTIC ACID POC    Collection Time: 08/19/21 10:18 AM   Result Value Ref Range    Calcium, ionized (POC) 1.14 1.12 - 1.32 mmol/L    BICARBONATE 27 mmol/L    Base excess (POC) 1.3 mmol/L    Sample source VENOUS BLOOD      CO2, POC 27 (H) 19 - 24 MMOL/L    Sodium,  136 - 145 MMOL/L    Potassium, POC 3.8 3.5 - 5.5 MMOL/L    Chloride,  100 - 108 MMOL/L    Glucose, POC 99 74 - 106 MG/DL    Creatinine, POC 1.2 0.6 - 1.3 MG/DL    Lactic Acid (POC) 1.78 0.40 - 2.00 mmol/L    Critical value read back RAFI DUKE     pH, venous (POC) 7.38 7.32 - 7.42      pCO2, venous (POC) 45.5 41 - 51 MMHG    pO2, venous (POC) 29 25 - 40 mmHg   COVID-19 RAPID TEST    Collection Time: 08/19/21 11:00 AM   Result Value Ref Range    Specimen source Nasopharyngeal      COVID-19 rapid test Not detected NOTD

## 2021-08-19 NOTE — ED NOTES
Patient is being transferred to [address], Room # [phone]. Report given to Tai Britton RN on Yolette Carlos for routine progression of care. Report consisted of the following information SBAR, Kardex, ED Summary, MAR and Recent Results. Patient transferred to receiving unit by: transport (RN or tech name). Outstanding consults needed: No     Next labs due: No     The following personal items will be sent with the patient during transfer to the floor:     All valuables:    Cardiac monitoring ordered: Yes    The following CURRENT information was reported to the receiving RN:    Code status: Prior at time of transfer    Last set of vital signs:  Vital Signs  Level of Consciousness: Alert (0) (08/19/21 1430)  Temp: 98.1 °F (36.7 °C) (08/19/21 1430)  Temp Source: Oral (08/19/21 1430)  Pulse (Heart Rate): 81 (08/19/21 1430)  Heart Rate Source: Monitor (08/19/21 1430)  Cardiac Rhythm: Atrial Fib (08/19/21 1430)  Resp Rate: 18 (08/19/21 1430)  BP: (!) 141/68 (08/19/21 1430)  MAP (Monitor): 88 (08/19/21 1430)  MAP (Calculated): 92 (08/19/21 1430)  BP 1 Location: Right arm (08/19/21 1430)  BP 1 Method: Automatic (08/19/21 1430)  BP Patient Position: At rest (08/19/21 1430)  MEWS Score: 1 (08/19/21 1430)         Oxygen Therapy  O2 Sat (%): 92 % (08/19/21 1430)  Pulse via Oximetry: 86 beats per minute (08/19/21 1430)  O2 Device: Nasal cannula (08/19/21 1430)  O2 Flow Rate (L/min): 2 l/min (08/19/21 1430)      Last pain assessment:  Pain 1  Pain Scale 1: Numeric (0 - 10)  Pain Intensity 1: 6  Patient Stated Pain Goal: 2  Pain Location 1: Arm  Pain Orientation 1: Left  Pain Description 1: Constant      Wounds: No     Urinary catheter: voiding  Is there a salomon order: No     LDAs:       Peripheral IV 08/19/21 Right Hand (Active)       Peripheral IV 08/19/21 Left Antecubital (Active)   Site Assessment Clean, dry, & intact 08/19/21 1146   Phlebitis Assessment 0 08/19/21 1146   Infiltration Assessment 0 08/19/21 1146 Dressing Status Clean, dry, & intact 08/19/21 1146         Opportunity for questions and clarification was provided.     Mansi Vasquez

## 2021-08-19 NOTE — PROGRESS NOTES
1634: TRANSFER - IN REPORT:    Verbal report received from [facility] RN(name) on Svetlana Lynch  being received from ED(unit) for routine progression of care      Report consisted of patients Situation, Background, Assessment and   Recommendations(SBAR). Information from the following report(s) SBAR, Kardex, Intake/Output, MAR and Recent Results was reviewed with the receiving nurse. Opportunity for questions and clarification was provided. Assessment completed upon patients arrival to unit and care assumed. 1512: Patient arrived on floor, patient was able to stand and pivot to bed with no problems, currently on 2L NC, VSS, urine sample and labs sent at this time. 1600: Ortho at bedside assessing patient. Wants to elevate and ice the affected area for now. Will plan for MRI when PCR results. 1900: I have reviewed and agree with LISETTE Wall documentation    1900: End of Shift Note    Bedside shift change report given to oncoming RN (oncoming nurse) by Brian Louis (offgoing nurse). Report included the following information SBAR, Kardex, Intake/Output, MAR and Recent Results    Shift worked:  [id number]     Shift summary and any significant changes:     new admission, COVID r/o, left thumb swelling- ortho seen      Concerns for physician to address:  none     Zone phone for oncoming shift:   XXX       Activity:  Activity Level:  Up with Assistance  Number times ambulated in hallways past shift: 0  Number of times OOB to chair past shift: 0    Cardiac:   Cardiac Monitoring: Yes      Cardiac Rhythm: Atrial Fib    Access:   Current line(s): PIV     Genitourinary:   Urinary status: voiding    Respiratory:   O2 Device: Nasal cannula  Chronic home O2 use?: NO  Incentive spirometer at bedside: NO     GI:  Last Bowel Movement Date: 08/19/21  Current diet:  ADULT DIET Regular; Low Fat/Low Chol/High Fiber/2 gm Na  Passing flatus: YES  Tolerating current diet: YES       Pain Management:   Patient states pain is manageable on current regimen: YES    Skin:  Ignacio Score: 20  Interventions: turn team, float heels, increase time out of bed and PT/OT consult    Patient Safety:  Fall Score:  Total Score: 2  Interventions: bed/chair alarm, gripper socks and pt to call before getting OOB       Length of Stay:  Expected LOS: - - -  Actual LOS: [address]

## 2021-08-19 NOTE — ED NOTES
Pt provided with urinal and notified need for urine and stool specimen. Pt verbalized understanding.

## 2021-08-19 NOTE — PROGRESS NOTES
Pharmacy Clarification of the Prior to Admission Medication Regimen Retrospective to the Admission Medication Reconciliation    The patient was not interviewed regarding clarification of the prior to admission medication regimen. Spoke with daughter by phone to verify current medications. She was questioned regarding use of any other inhalers, topical products, over the counter medications, herbal medications, vitamin products or ophthalmic/nasal/otic medication use. Information Obtained From: query, daughter      Recommendations/Findings: The following amendments were made to the patient's active medication list on file at [facility]:     1) Additions:   Betameth/dip 0.05%      2) Removals:   IBU      3) Changes:      4) Pertinent Pharmacy Findings:  Updated patients preferred outpatient pharmacy to: [facility] medication list was corrected to the following:     Prior to Admission Medications   Prescriptions Last Dose Informant Taking?   aspirin delayed-release 81 mg tablet 8/18/2021 at Unknown time Child Yes   Sig: Take 81 mg by mouth daily. betamethasone dipropionate (DIPROSONE) 0.05 % topical cream  Other Yes   Sig: Apply  to affected area two (2) times daily as needed for Skin Irritation. carvedilol (COREG) 6.25 mg tablet 8/18/2021 at Unknown time Child Yes   Sig: Take 6.25 mg by mouth two (2) times a day. clopidogrel (PLAVIX) 75 mg tab 8/18/2021 at Unknown time Child Yes   Sig: Take 75 mg by mouth daily. digoxin (LANOXIN) 0.125 mg tablet 8/18/2021 at Unknown time Child Yes   Sig: Take 0.125 mg by mouth daily. fluticasone-salmeterol (ADVAIR DISKUS) 250-50 mcg/dose diskus inhaler 8/18/2021 at Unknown time Child Yes   Sig: Take 1 Puff by inhalation two (2) times a day. folic acid (FOLVITE) 1 mg tablet 8/18/2021 at Unknown time Child Yes   Sig: Take 1 Tab by mouth daily. furosemide (LASIX) 80 mg tablet 8/18/2021 at Unknown time Child Yes   Sig: Take 80 mg by mouth daily.    gabapentin (NEURONTIN) 300 mg capsule 8/18/2021 at Unknown time Child Yes   Sig: Take 600 mg by mouth two (2) times a day. potassium chloride SR (KLOR-CON 10) 10 mEq tablet 8/18/2021 at Unknown time Child Yes   Sig: Take 2 Tabs by mouth two (2) times a day. pravastatin (PRAVACHOL) 40 mg tablet 8/18/2021 at Unknown time Child Yes   Sig: Take 40 mg by mouth daily. tiotropium (SPIRIVA WITH HANDIHALER) 18 mcg inhalation capsule 8/18/2021 at Unknown time Child Yes   Sig: Take 1 Cap by inhalation daily.       Facility-Administered Medications: None        Thank you,  Jesika Hauser CPHT  Medication History Pharmacy Technician

## 2021-08-19 NOTE — PROGRESS NOTES
Transition of Care Plan:    RUR: Not available at the time of assessment   Disposition: Home  Follow up appointments: PCP  DME needed: TBD  Transportation at Discharge: Patient daughter Silvia Brittle to transport   [address] or means to access home:    Yes    IM Medicare Letter: 2nd IM letter before discharge   Is patient a BCPI-A Bundle: N/A          If yes, was Bundle Letter given?: N/A     Caregiver Contact: Daughter Silvia Brittle [phone]  Discharge Caregiver contacted prior to discharge? Unit CM to follow up before discharge    Reason for Admission:  Pneumonia                      RUR Score:   Not available at the time of assessment                     Plan for utilizing home health:      TBD    PCP: First and Last name:  Earl Fermin MD     Name of Practice: [address]   Are you a current patient: Yes/No: Yes   Approximate date of last visit: 21   Can you participate in a virtual visit with your PCP: Yes                    Current Advanced Directive/Advance Care Plan: Prior  Anthony Gonzalez (ACP) Conversation    Date of Conversation: 2021  Conducted with: Patient with Decision Making Capacity    Healthcare Decision Maker:     Primary Decision Maker: Josefina Chavira - Child - [phone]  Click here to complete [address] including selection of the Healthcare Decision Maker Relationship (ie \"Primary\")        Content/Action Overview: Has ACP document(s) on file - reflects the patient's care preferences  Reviewed DNR/DNI and patient elects Full Code (Attempt Resuscitation)    Length of Voluntary ACP Conversation in minutes:  <16 minutes (Non-Billable)    Noé Julio RN                        Transition of Care Plan:                    CM met with patient at the bedside to discuss discharge planning. Patient name, , and demographics all verified in chart. Patient lives alone in a one story home.  Patient reports being independent of his ADLS/IDLS including driving. Patient reports that he uses a cane sometimes. Patient's preferred pharmacy is [facility]. Patient has no SNF, HH, or IPR history. Patient has a AMD on file. Patient's daughter will provide transport at discharge. Care Management Interventions  PCP Verified by CM: Yes  Mode of Transport at Discharge: Other (see comment) (Daughter to transport at discharge )  Transition of Care Consult (CM Consult): Discharge Planning  Discharge Durable Medical Equipment: No  Physical Therapy Consult: No  Occupational Therapy Consult: No  Speech Therapy Consult: No  Current Support Network: Own Home, Lives Alone  Confirm Follow Up Transport: Family  Discharge Location  Discharge Placement: Home    Unit CM to continue to follow for discharge needs and planning.     Lyudmila Rincon, HAYDERN, RN, BSW   RN Care Manager

## 2021-08-20 ENCOUNTER — APPOINTMENT (OUTPATIENT)
Dept: MRI IMAGING | Age: 71
DRG: 193 | End: 2021-08-20
Attending: STUDENT IN AN ORGANIZED HEALTH CARE EDUCATION/TRAINING PROGRAM
Payer: MEDICARE

## 2021-08-20 LAB
ANION GAP SERPL CALC-SCNC: 5 MMOL/L (ref 5–15)
BASOPHILS # BLD: 0 K/UL (ref 0–0.1)
BASOPHILS NFR BLD: 1 % (ref 0–1)
BUN SERPL-MCNC: 17 MG/DL (ref 6–20)
BUN/CREAT SERPL: 14 (ref 12–20)
C DIFF GDH STL QL: NEGATIVE
C DIFF TOX A+B STL QL IA: NEGATIVE
CALCIUM SERPL-MCNC: 8.5 MG/DL (ref 8.5–10.1)
CHLORIDE SERPL-SCNC: 104 MMOL/L (ref 97–108)
CO2 SERPL-SCNC: 27 MMOL/L (ref 21–32)
CREAT SERPL-MCNC: 1.18 MG/DL (ref 0.7–1.3)
CRP SERPL-MCNC: 12.9 MG/DL (ref 0–0.6)
DIFFERENTIAL METHOD BLD: ABNORMAL
EOSINOPHIL # BLD: 0.3 K/UL (ref 0–0.4)
EOSINOPHIL NFR BLD: 8 % (ref 0–7)
ERYTHROCYTE [DISTWIDTH] IN BLOOD BY AUTOMATED COUNT: 23.3 % (ref 11.5–14.5)
GLUCOSE SERPL-MCNC: 117 MG/DL (ref 65–100)
HCT VFR BLD AUTO: 38.5 % (ref 36.6–50.3)
HGB BLD-MCNC: 10.8 G/DL (ref 12.1–17)
IMM GRANULOCYTES # BLD AUTO: 0 K/UL (ref 0–0.04)
IMM GRANULOCYTES NFR BLD AUTO: 0 % (ref 0–0.5)
INTERPRETATION: NORMAL
LYMPHOCYTES # BLD: 0.7 K/UL (ref 0.8–3.5)
LYMPHOCYTES NFR BLD: 19 % (ref 12–49)
MCH RBC QN AUTO: 20.4 PG (ref 26–34)
MCHC RBC AUTO-ENTMCNC: 28.1 G/DL (ref 30–36.5)
MCV RBC AUTO: 72.8 FL (ref 80–99)
MONOCYTES # BLD: 0.4 K/UL (ref 0–1)
MONOCYTES NFR BLD: 11 % (ref 5–13)
NEUTS SEG # BLD: 2.3 K/UL (ref 1.8–8)
NEUTS SEG NFR BLD: 61 % (ref 32–75)
NRBC # BLD: 0 K/UL (ref 0–0.01)
NRBC BLD-RTO: 0 PER 100 WBC
PLATELET # BLD AUTO: 193 K/UL (ref 150–400)
PMV BLD AUTO: 10.3 FL (ref 8.9–12.9)
POTASSIUM SERPL-SCNC: 4.5 MMOL/L (ref 3.5–5.1)
RBC # BLD AUTO: 5.29 M/UL (ref 4.1–5.7)
RBC MORPH BLD: ABNORMAL
SARS-COV-2, XPLCVT: NOT DETECTED
SODIUM SERPL-SCNC: 136 MMOL/L (ref 136–145)
SOURCE, COVRS: NORMAL
WBC # BLD AUTO: 3.7 K/UL (ref 4.1–11.1)
WBC #/AREA STL HPF: NORMAL /HPF (ref 0–4)

## 2021-08-20 PROCEDURE — 73221 MRI JOINT UPR EXTREM W/O DYE: CPT

## 2021-08-20 PROCEDURE — 74011250637 HC RX REV CODE- 250/637: Performed by: HOSPITALIST

## 2021-08-20 PROCEDURE — 74011000258 HC RX REV CODE- 258: Performed by: HOSPITALIST

## 2021-08-20 PROCEDURE — 80048 BASIC METABOLIC PNL TOTAL CA: CPT

## 2021-08-20 PROCEDURE — 97165 OT EVAL LOW COMPLEX 30 MIN: CPT | Performed by: OCCUPATIONAL THERAPIST

## 2021-08-20 PROCEDURE — 2709999900 HC NON-CHARGEABLE SUPPLY

## 2021-08-20 PROCEDURE — 77010033678 HC OXYGEN DAILY

## 2021-08-20 PROCEDURE — 65660000000 HC RM CCU STEPDOWN

## 2021-08-20 PROCEDURE — 73218 MRI UPPER EXTREMITY W/O DYE: CPT

## 2021-08-20 PROCEDURE — 94640 AIRWAY INHALATION TREATMENT: CPT

## 2021-08-20 PROCEDURE — 36415 COLL VENOUS BLD VENIPUNCTURE: CPT

## 2021-08-20 PROCEDURE — 85025 COMPLETE CBC W/AUTO DIFF WBC: CPT

## 2021-08-20 PROCEDURE — 97530 THERAPEUTIC ACTIVITIES: CPT | Performed by: OCCUPATIONAL THERAPIST

## 2021-08-20 PROCEDURE — 97530 THERAPEUTIC ACTIVITIES: CPT

## 2021-08-20 PROCEDURE — 74011250636 HC RX REV CODE- 250/636: Performed by: HOSPITALIST

## 2021-08-20 PROCEDURE — 97162 PT EVAL MOD COMPLEX 30 MIN: CPT

## 2021-08-20 RX ADMIN — ASPIRIN 81 MG: 81 TABLET, COATED ORAL at 09:09

## 2021-08-20 RX ADMIN — FLUTICASONE FUROATE AND VILANTEROL TRIFENATATE 1 PUFF: 200; 25 POWDER RESPIRATORY (INHALATION) at 09:15

## 2021-08-20 RX ADMIN — Medication 10 ML: at 14:00

## 2021-08-20 RX ADMIN — POTASSIUM CHLORIDE 20 MEQ: 750 TABLET, FILM COATED, EXTENDED RELEASE ORAL at 09:09

## 2021-08-20 RX ADMIN — FOLIC ACID 1 MG: 1 TABLET ORAL at 09:09

## 2021-08-20 RX ADMIN — CLOPIDOGREL BISULFATE 75 MG: 75 TABLET ORAL at 09:09

## 2021-08-20 RX ADMIN — TIOTROPIUM BROMIDE INHALATION SPRAY 2 PUFF: 3.12 SPRAY, METERED RESPIRATORY (INHALATION) at 09:15

## 2021-08-20 RX ADMIN — DOXYCYCLINE 100 MG: 100 INJECTION, POWDER, LYOPHILIZED, FOR SOLUTION INTRAVENOUS at 09:09

## 2021-08-20 RX ADMIN — ENOXAPARIN SODIUM 40 MG: 40 INJECTION SUBCUTANEOUS at 09:08

## 2021-08-20 RX ADMIN — CARVEDILOL 6.25 MG: 6.25 TABLET, FILM COATED ORAL at 17:24

## 2021-08-20 RX ADMIN — DIGOXIN 0.12 MG: 125 TABLET ORAL at 09:09

## 2021-08-20 RX ADMIN — POTASSIUM CHLORIDE 20 MEQ: 750 TABLET, FILM COATED, EXTENDED RELEASE ORAL at 17:24

## 2021-08-20 RX ADMIN — DOXYCYCLINE 100 MG: 100 INJECTION, POWDER, LYOPHILIZED, FOR SOLUTION INTRAVENOUS at 20:42

## 2021-08-20 RX ADMIN — GABAPENTIN 600 MG: 300 CAPSULE ORAL at 09:09

## 2021-08-20 RX ADMIN — FUROSEMIDE 80 MG: 40 TABLET ORAL at 09:09

## 2021-08-20 RX ADMIN — PRAVASTATIN SODIUM 40 MG: 40 TABLET ORAL at 09:09

## 2021-08-20 RX ADMIN — CARVEDILOL 6.25 MG: 6.25 TABLET, FILM COATED ORAL at 09:09

## 2021-08-20 RX ADMIN — CEFTRIAXONE 1 G: 1 INJECTION, POWDER, FOR SOLUTION INTRAMUSCULAR; INTRAVENOUS at 11:36

## 2021-08-20 RX ADMIN — Medication 10 ML: at 23:31

## 2021-08-20 RX ADMIN — GABAPENTIN 600 MG: 300 CAPSULE ORAL at 17:24

## 2021-08-20 NOTE — PROGRESS NOTES
TRANSFER - IN REPORT:    Verbal report received from [address] [facility] [address] (name) on Kosta Saunders  being received from PCU (unit) for routine progression of care      Report consisted of patients Situation, Background, Assessment and   Recommendations(SBAR). Information from the following report(s) SBAR, Kardex, Intake/Output and MAR was reviewed with the receiving nurse. Opportunity for questions and clarification was provided. Assessment completed upon patients arrival to unit and care assumed. End of Shift Note    Bedside shift change report given to [address] [place] [address] (oncoming nurse) by Anita Salcido RN (offgoing nurse). Report included the following information SBAR, Kardex, Intake/Output and MAR    Shift worked:  7a-7p     Shift summary and any significant changes:     Patient arrived to unit after shift change.  Patient was down at MRI     Concerns for physician to address:  n/a     Zone phone for oncoming shift:   044 Kendall Mckeon RN not motivated to quit

## 2021-08-20 NOTE — PROGRESS NOTES
0700: Bedside shift change report given to Kingston Gross RN (oncoming nurse) by Precious Mtz RN (offgoing nurse). Report included the following information SBAR, Kardex, Intake/Output, MAR and Recent Results. 0745: Droplet Plus precautions d/c per protocol d/t negative COVID PCR.     5604: PT/OT working with patient. Patient able to stand, but orthostatic hypotensive. Patient back in bed, bed alarm on, call bell in reach. 1255: MRI screening form faxed to [facility].     1305: Spoke with Dr. Razia Pack, will continue current treatment and verbal orders to place TX orders to tele. 1511: MRI called and stated if we want a view up to the elbow, we have to change the order to full Left upper extremity. Dr. Emery Sensor to clarify order. 1515: Dr. Razia Pack responded, he does want MRI of the hand and the upper arm, order placed. 1744: Patient off the floor for MRI. 1847: Spoke with transport, [facility] and [facility], Transport will take patient straight to [facility]. [phone]). Belongings and patient chart sent to [facility] as well.     1804: TRANSFER - OUT REPORT:    Verbal report given to [facility], RN(name) on Alesia Hands  being transferred to [facility](unit) for routine progression of care       Report consisted of patients Situation, Background, Assessment and   Recommendations(SBAR). Information from the following report(s) SBAR, Kardex, Intake/Output, MAR and Recent Results was reviewed with the receiving nurse.     Lines:   Peripheral IV 08/19/21 Right Hand (Active)   Site Assessment Clean, dry, & intact 08/20/21 1452   Phlebitis Assessment 0 08/20/21 1452   Infiltration Assessment 0 08/20/21 1452   Dressing Status Clean, dry, & intact 08/20/21 1452   Dressing Type Tape;Transparent 08/20/21 1452   Hub Color/Line Status Pink;Capped;Flushed 08/20/21 1452   Action Taken Open ports on tubing capped 08/20/21 1452   Alcohol Cap Used Yes 08/20/21 1452       Peripheral IV 08/19/21 Left Antecubital (Active)   Site Assessment Clean, dry, & intact 08/20/21 1452   Phlebitis Assessment 0 08/20/21 1452   Infiltration Assessment 0 08/20/21 1452   Dressing Status Clean, dry, & intact 08/20/21 1452   Dressing Type Tape;Transparent 08/20/21 1452   Hub Color/Line Status Pink;Capped;Flushed 08/20/21 1452   Action Taken Open ports on tubing capped 08/20/21 1452   Alcohol Cap Used Yes 08/20/21 1452        Opportunity for questions and clarification was provided. Patient transported with:   Payam Patch: Room Is still being cleaned, RN will call me when room is clean.     1900: End of Shift Note    Bedside shift change report given to Linda Velasquez RN (oncoming nurse) by Jack Anne (offgoing nurse). Report included the following information SBAR, Kardex, Intake/Output, MAR and Recent Results    Shift worked:  [id number]     Shift summary and any significant changes:     MRI of left upper extrem done, PT/OT worked with patient. COVID negative. C diff still pending TX to tele     Concerns for physician to address:  none     Zone phone for oncoming shift:   XXX       Activity:  Activity Level: Up with Assistance  Number times ambulated in hallways past shift: 0  Number of times OOB to chair past shift: 0    Cardiac:   Cardiac Monitoring: Yes      Cardiac Rhythm: Atrial Fib    Access:   Current line(s): PIV     Genitourinary:   Urinary status: voiding    Respiratory:   O2 Device: Nasal cannula  Chronic home O2 use?: NO  Incentive spirometer at bedside: N/A     GI:  Last Bowel Movement Date: 08/19/21  Current diet:  ADULT DIET Regular; Low Fat/Low Chol/High Fiber/2 gm Na  Passing flatus: YES  Tolerating current diet: YES       Pain Management:   Patient states pain is manageable on current regimen: YES    Skin:  Ignacio Score: 19  Interventions: float heels, increase time out of bed and PT/OT consult    Patient Safety:  Fall Score:  Total Score: 4  Interventions: bed/chair alarm, gripper socks, pt to call before getting OOB and stay with me (per policy)  High Fall Risk: Yes    Length of Stay:  Expected LOS: 4d 2h  Actual LOS: [facility]

## 2021-08-20 NOTE — PROGRESS NOTES
Hospitalist Progress Note    NAME: Jimbo Borrero   :  1950   MRN:  [id number]       Assessment / Plan:  Acute hypoxic respiratory failure, POA due to  Right sided community acquired pneumonia, POA  COVID pcr negative Has been vaccinated with Moderna vaccine  Continue doxycycline and rocephin  Developed hives to levaquin in ER, treated with Benadryl  CTA chest negative PE, +RML consolidation and patchy opacification in RUL.    Left thumb cellulitis, POA with suspected tenosynovitis  Left olecranon/elbow swelling and pain with decreased ROM  --xray left hand with soft tissue swelling; denies any injury  --IV abx, check uric acid, CRP, ESR, orthopedic consulted  MRI ordered.     Diarrhea, POA  Rule out C. diff  --recently on z-pack on  by pcp  C diff pending. Copd, without exac  --continue advair and spiriva     Chronic systolic chf EF 89-98%   Trace b/l leg edema, R calf larger than left  HTN  CAD  --continue lasix, potassium, digoxin  --continue aspirin, plavix, statin, coreg,     PAD hx right SFA stent 2014     Persistent afib  --rate controlled. Continue aspirin/plavix  --continue digoxin, check level     CKD stage 3  --Cr 1.3, was 1.75 in .   Baseline unkown, prior Cr 1-1.1 in   Will monitor    Neuropathy  --continue gabapentin     Colon cancer   --follow with Dr. Blake Galarza; colonoscopy      Hx of tongue cancer s/p resection, XRT and chemo 1 year ago per patient     Body mass index is 28.35 kg/m².     Code:  full  DVT prophylaxis: lovenox  Surrogate decision maker:  Denisse Giordano     Estimated discharge date:   Barriers:CLiniclal improvement     Subjective:     Chief Complaint / Reason for Physician Visit  Follow up for right pneumonia  He has left thumb and elbow pain    Review of Systems:  Symptom Y/N Comments  Symptom Y/N Comments   Fever/Chills n   Chest Pain n    Poor Appetite n   Edema     Cough    Abdominal Pain     Sputum    Joint Pain     SOB/RILEY Pruritis/Rash     Nausea/vomit    Tolerating PT/OT     Diarrhea    Tolerating Diet     Constipation    Other       Could NOT obtain due to:      Objective:     VITALS:   Last 24hrs VS reviewed since prior progress note. Most recent are:  Patient Vitals for the past 24 hrs:   Temp Pulse Resp BP SpO2   08/20/21 1451 98 °F (36.7 °C) 67 17 (!) 108/56 95 %   08/20/21 1238  66  (!) 110/48 97 %   08/20/21 1235  72  (!) 99/45 (!) 87 %   08/20/21 1227  (!) 59  112/78 98 %   08/20/21 1149  64      08/20/21 1048 98.3 °F (36.8 °C) 74 21 126/61 96 %   08/20/21 0915     95 %   08/20/21 0757  81      08/20/21 0754 98.3 °F (36.8 °C) 95 21 (!) 123/96 92 %   08/20/21 0400  67      08/20/21 0315 98.4 °F (36.9 °C) 67 13 (!) 114/51 95 %   08/20/21 0000  67      08/19/21 2320 98.1 °F (36.7 °C) 68 11 (!) 100/49 96 %   08/19/21 1948 98.5 °F (36.9 °C) 63 16 (!) 103/50 96 %       Intake/Output Summary (Last 24 hours) at 8/20/2021 1559  Last data filed at 8/20/2021 1452  Gross per 24 hour   Intake 540 ml   Output 200 ml   Net 340 ml        I had a face to face encounter and independently examined this patient on 8/20/2021, as outlined below:  PHYSICAL EXAM:  General: WD, WN. Alert, cooperative, no acute distress    EENT:  EOMI. Anicteric sclerae. MMM  Resp:  CTA bilaterally, no wheezing or rales. No accessory muscle use  CV:  Regular  rhythm,  No edema  GI:  Soft, Non distended, Non tender. +Bowel sounds  Neurologic:  Alert and oriented X 2, normal speech,   Psych:   Good insight. Not anxious nor agitated  Skin:  No rashes.   No jaundice    Reviewed most current lab test results and cultures  YES  Reviewed most current radiology test results   YES  Review and summation of old records today    NO  Reviewed patient's current orders and MAR    YES  PMH/SH reviewed - no change compared to H&P  ________________________________________________________________________  Care Plan discussed with:    Comments   Patient y Family      RN y    Care Manager     Consultant                        Multidiciplinary team rounds were held today with , nursing, pharmacist and clinical coordinator. Patient's plan of care was discussed; medications were reviewed and discharge planning was addressed. ________________________________________________________________________  Total NON critical care TIME: 35 Minutes    Total CRITICAL CARE TIME Spent:   Minutes non procedure based      Comments   >50% of visit spent in counseling and coordination of care     ________________________________________________________________________  Magdalene Guzmán MD     Procedures: see electronic medical records for all procedures/Xrays and details which were not copied into this note but were reviewed prior to creation of Plan. LABS:  I reviewed today's most current labs and imaging studies.   Pertinent labs include:  Recent Labs     08/20/21  0159 08/19/21  1010   WBC 3.7* 4.9   HGB 10.8* 10.0*   HCT 38.5 34.3*    223     Recent Labs     08/20/21  0159 08/19/21  1010    136   K 4.5 3.9    104   CO2 27 28   * 94   BUN 17 18   CREA 1.18 1.30   CA 8.5 8.8   ALB  --  3.2*   TBILI  --  1.2*   ALT  --  11*       Signed: Magdalene Guzmán MD

## 2021-08-20 NOTE — PROGRESS NOTES
Problem: Mobility Impaired (Adult and Pediatric)  Goal: *Acute Goals and Plan of Care (Insert Text)  Description: FUNCTIONAL STATUS PRIOR TO ADMISSION: Patient was modified independent using a cane for functional mobility. HOME SUPPORT PRIOR TO ADMISSION: The patient lived alone with daughter to provide assistance. Physical Therapy Goals  Initiated 8/20/2021  1. Patient will move from supine to sit and sit to supine , scoot up and down, and roll side to side in bed with modified independence within 7 day(s). 2.  Patient will transfer from bed to chair and chair to bed with modified independence using the least restrictive device within 7 day(s). 3.  Patient will perform sit to stand with modified independence within 7 day(s). 4.  Patient will ambulate with modified independence for 150 feet with the least restrictive device within 7 day(s). 5.  Patient will ascend/descend 3 stairs with handrail(s) with supervision/set-up within 7 day(s). Outcome: Not Met   PHYSICAL THERAPY EVALUATION  Patient: Geremias Mcneil (1 y.o. male)  Date: 8/20/2021  Primary Diagnosis: Acute respiratory failure with hypoxia ([facility] [J96.01]  Pneumonia [J18.9]  Cellulitis of left hand [L03.114]  Diarrhea [R19.7]        Precautions:   Contact, Fall    ASSESSMENT  Based on the objective data described below, the patient presents with decreased activity tolerance with desaturation during activity, orthostatic hypotension during standing, increased risk for falls, LUE swelling/redness, and decreased overall independence following admission for ARF, PNA, and L hand swelling. Pending MRI LUE, COVID neg. Patient on 4L NC at start of session, SpO2 remaining >95% at rest. Patient is A&O x4 though with garbled speech pattern and tangential conversation. Patient able to complete bed mobility supervision and sit<>stand with supervision. Upon standing, patient becoming SOB with orthostatic hypotension and SpO2 dropping to 87% on RA.  Returned to supine position and on 4L NC with vitals normalizing. Functional mobility limited by unstable vitals this session. Currently, recommend SNF level rehab at discharge. If he makes significant progress, may be able to discharge home with [facility] services. 08/20/21 1227 08/20/21 1235 08/20/21 1238   Vital Signs   Pulse (Heart Rate) (!) 59 72 66   Heart Rate Source Monitor Monitor Monitor   Level of Consciousness Alert (0)  --   --    /78 (!) 99/45 (!) 110/48   MAP (Calculated) 89 (!) 63 69   BP 1 Location Right upper arm Right upper arm Right upper arm   BP 1 Method Automatic Automatic Automatic   BP Patient Position Sitting Standing At rest   O2 Sat (%) 98 % (!) 87 % 97 %   O2 Device None (Room air) None (Room air) Nasal cannula   O2 Flow Rate (L/min)  --   --  4 l/min     Current Level of Function Impacting Discharge (mobility/balance): Supervision bed mobility and transfers    Functional Outcome Measure: The patient scored 35/100 on the Barthel Index outcome measure which is indicative of moderately impaired functional ability. Other factors to consider for discharge: unstable vitals     Patient will benefit from skilled therapy intervention to address the above noted impairments. PLAN :  Recommendations and Planned Interventions: bed mobility training, transfer training, gait training, therapeutic exercises, neuromuscular re-education, patient and family training/education and therapeutic activities      Frequency/Duration: Patient will be followed by physical therapy:  4 times a week to address goals.     Recommendation for discharge: (in order for the patient to meet his/her long term goals)  Therapy up to 5 days/week in SNF setting or home health therapy program    This discharge recommendation:  Has not yet been discussed the attending provider and/or case management    IF patient discharges home will need the following DME: to be determined (TBD)     SUBJECTIVE:   Patient stated My hand just zoraida.    OBJECTIVE DATA SUMMARY:   HISTORY:    Past Medical History:   Diagnosis Date    Atrial fibrillation ([facility]     CAD (coronary artery disease)     Cancer ([facility]     Colon CA 2014 - polyp removed/chemo/radiation    Chronic obstructive pulmonary disease (HCC)     GERD (gastroesophageal reflux disease)     Heart failure (HCC)     Hypertension     Sleep apnea     not using machine because of surgery on tongue     Past Surgical History:   Procedure Laterality Date    COLONOSCOPY N/A 9/21/2018    COLONOSCOPY performed by Chava Jimenez MD at [address] [place] [address] ENDOSCOPY    COLONOSCOPY N/A 1/3/2020    COLONOSCOPY performed by Mayelin Beebe MD at [facility] COLONOSCOPY N/A 2/7/2020    COLONOSCOPY performed by Mayelin Beebe MD at \[facility]"" [facility] Left 5/29/2020    FLEXIBLE SIGMOIDOSCOPY WITH ARGON BEAM COAGULATION performed by Mayelin Beebe MD at [address] [place] [address] ENDOSCOPY    HX AFIB ABLATION      HX GI      cancerous polyps removed  - pt usnure if done in OR or during colonoscopy    HX GI      colonoscopy x 2 - polyps both times    HX ORTHOPAEDIC      killed nerves in feet    VASCULAR SURGERY PROCEDURE UNLIST      stents legs bilaterally 2014/2015     Personal factors and/or comorbidities impacting plan of care: Hx of cancer, neuropathy, CHF, EF 25%    Home Situation  Home Environment: Private residence  [address] [place] [address] St: One story  Living Alone: Yes  Support Systems: Child(shirin)  Patient Expects to be Discharged to[de-identified] House  Current DME Used/Available at Home: Cane, straight    EXAMINATION/PRESENTATION/DECISION MAKING:   Critical Behavior:  Neurologic State: Alert, Drowsy  Orientation Level: Oriented X4  Cognition: Follows commands, Impaired decision making     Hearing:   Auditory  Auditory Impairment: Hard of hearing, bilateral  Range Of Motion:  AROM: Generally decreased, functional (Limited L hand)  PROM: Generally decreased, functional  Strength:    Strength: Generally decreased, functional  Tone & Sensation:   Tone: Normal    Coordination:  Coordination: Generally decreased, functional  Vision:      Functional Mobility:  Bed Mobility:  Rolling: Supervision  Supine to Sit: Supervision  Sit to Supine: Supervision  Scooting: Supervision  Transfers:  Sit to Stand: Contact guard assistance  Stand to Sit: Contact guard assistance  Balance:   Sitting: Intact  Standing: Impaired  Standing - Static: Good;Constant support  Standing - Dynamic : Fair;Constant support    Functional Measure:  Barthel Index:    Bathin  Bladder: 5  Bowels: 5  Groomin  Dressin  Feeding: 10  Mobility: 0  Stairs: 0  Toilet Use: 0  Transfer (Bed to Chair and Back): 10  Total: 35/100       The Barthel ADL Index: Guidelines  1. The index should be used as a record of what a patient does, not as a record of what a patient could do. 2. The main aim is to establish degree of independence from any help, physical or verbal, however minor and for whatever reason. 3. The need for supervision renders the patient not independent. 4. A patient's performance should be established using the best available evidence. Asking the patient, friends/relatives and nurses are the usual sources, but direct observation and common sense are also important. However direct testing is not needed. 5. Usually the patient's performance over the preceding 24-48 hours is important, but occasionally longer periods will be relevant. 6. Middle categories imply that the patient supplies over 50 per cent of the effort. 7. Use of aids to be independent is allowed. Shelbie Jordan., Barthel, D.W. (1167). Functional evaluation: the Barthel Index. [facility] (14)2. YVON Hamm, Trace Gil., John Dominguez., Cristian, [address] [place] [address] Ave (). Measuring the change indisability after inpatient rehabilitation; comparison of the responsiveness of the Barthel Index and Functional [place] Measure.  Journal of Neurology, Neurosurgery, and Psychiatry, 664), 705-295. PIEDAD Marcus, LEO Gomez, & Nikos Zuniga M.A. (2004.) Assessment of post-stroke quality of life in cost-effectiveness studies: The usefulness of the Barthel Index and the EuroQoL-5D. Quality of Life Research, 15, 867-50     Physical Therapy Evaluation Charge Determination   History Examination Presentation Decision-Making   HIGH Complexity :3+ comorbidities / personal factors will impact the outcome/ POC  MEDIUM Complexity : 3 Standardized tests and measures addressing body structure, function, activity limitation and / or participation in recreation  MEDIUM Complexity : Evolving with changing characteristics  Other outcome measures Barthel Index 35/100  MEDIUM      Based on the above components, the patient evaluation is determined to be of the following complexity level: MEDIUM    Pain Rating:  Patient c/o pain in L hand but unable to quantify    Activity Tolerance:   Fair, Poor, desaturates with exertion and requires oxygen, observed SOB with activity and signs and symptoms of orthostatic hypotension    After treatment patient left in no apparent distress:   Supine in bed, Call bell within reach, Bed / chair alarm activated and Side rails x 3    COMMUNICATION/EDUCATION:   The patients plan of care was discussed with: Occupational therapist and Registered nurse. Fall prevention education was provided and the patient/caregiver indicated understanding., Patient/family have participated as able in goal setting and plan of care. and Patient/family agree to work toward stated goals and plan of care.     Thank you for this referral.  Elisabet Weldon, PT   Time Calculation: 26 mins

## 2021-08-20 NOTE — PROGRESS NOTES
MRI Pending:    Need completed online Kardex MRI screening form. Sign and fax to [phone]. Call [phone] once faxed.

## 2021-08-20 NOTE — PROGRESS NOTES
Problem: Self Care Deficits Care Plan (Adult)  Goal: *Acute Goals and Plan of Care (Insert Text)  Description: FUNCTIONAL STATUS PRIOR TO ADMISSION: Patient reports being independent with basic self-care and light IADL    HOME SUPPORT: The patient lived alone with a daughter to provide assistance. Occupational Therapy Goals  Initiated 8/20/2021  1. Patient will perform grooming standing at the sink with supervision/SBA within 7 day(s). 2.  Patient will perform bathing with minimal assistance/contact guard assist within 7 day(s). 3.  Patient will perform lower body dressing with minimal assistance/contact guard assist within 7 day(s). 4.  Patient will perform toilet transfers with supervision/SBA within 7 day(s). 5.  Patient will perform all aspects of toileting with SBA within 7 day(s). 6.  Patient will participate in upper extremity therapeutic exercises for 10 minutes with Min cues within 7 day(s). Outcome: Not Met    OCCUPATIONAL THERAPY EVALUATION  Patient: Bernie Cui (63 y.o. male)  Date: 8/20/2021  Primary Diagnosis: Acute respiratory failure with hypoxia ([facility] [address] [J96.01]  Pneumonia [J18.9]  Cellulitis of left hand [L03.114]  Diarrhea [R19.7]        Precautions:  Contact, Fall    ASSESSMENT  Based on the objective data described below, the patient presents with deficits in self-care, primarily due to decreased acctivity tolerance, decreased standing tolerance and balance, general weakness, decreased safety and insight into deficits, L UE pain, and edema of left thumb and left elbow. He is oriented but confused. He follows simple commands. Some dizziness with standing and BP noted to drop; discussed with RN. SpO2 was reading intermittently in the upper 80's despite supplemental oxygen, however unsure of the accuracy of the reading. Patient reports living alone. He will benefit from skilled OT treatment to maximize safety and independence in basic self-care tasks.     Current Level of Function Impacting Discharge (ADLs/self-care): up to Max A    Functional Outcome Measure: The patient scored 35/100 on the Barthel Index. Other factors to consider for discharge: lives alone     Patient will benefit from skilled therapy intervention to address the above noted impairments. PLAN :  Recommendations and Planned Interventions: self care training, functional mobility training, therapeutic exercise, balance training, therapeutic activities, cognitive retraining, endurance activities, neuromuscular re-education, patient education, home safety training, and family training/education    Frequency/Duration: Patient will be followed by occupational therapy 4 times a week to address goals. Recommendation for discharge: (in order for the patient to meet his/her long term goals)  SNF for rehab vs Home with home health depending on progress    This discharge recommendation:  A follow-up discussion with the attending provider and/or case management is planned    IF patient discharges home will need the following DME: TBD       SUBJECTIVE:   Patient stated I can't see anything with them.  (Readers on bedside table - Noted to have large, clear stickers on both lenses with writing on them)    OBJECTIVE DATA SUMMARY:   HISTORY:   Past Medical History:   Diagnosis Date    Atrial fibrillation ([facility] [address]     CAD (coronary artery disease)     Cancer ([address]     Colon CA 2014 - polyp removed/chemo/radiation    Chronic obstructive pulmonary disease (HCC)     GERD (gastroesophageal reflux disease)     Heart failure (HCC)     Hypertension     Sleep apnea     not using machine because of surgery on tongue     Past Surgical History:   Procedure Laterality Date    COLONOSCOPY N/A 9/21/2018    COLONOSCOPY performed by Radha Tsang MD at [facility] ENDOSCOPY    COLONOSCOPY N/A 1/3/2020    COLONOSCOPY performed by Feliberto Hobbs MD at [facility] ENDOSCOPY    COLONOSCOPY N/A 2/7/2020    COLONOSCOPY performed by Feliberto Hobbs MD at [facility] Left 5/29/2020    FLEXIBLE SIGMOIDOSCOPY WITH ARGON BEAM COAGULATION performed by Shawn Cook MD at [facility] ENDOSCOPY    HX AFIB ABLATION      HX GI      cancerous polyps removed  - pt usnure if done in OR or during colonoscopy    HX GI      colonoscopy x 2 - polyps both times    HX ORTHOPAEDIC      killed nerves in feet    VASCULAR SURGERY PROCEDURE UNLIST      stents legs bilaterally 2014/2015       Expanded or extensive additional review of patient history:     Home Situation  Home Environment: Private residence  One/Two Story Residence: One story  Living Alone: Yes  Support Systems: Child(shiirn)  Patient Expects to be Discharged to[de-identified] House  Current DME Used/Available at Home: Cane, straight    Hand dominance: Right    EXAMINATION OF PERFORMANCE DEFICITS:  Cognitive/Behavioral Status:  Neurologic State: Alert;Confused  Orientation Level: Oriented X4  Cognition: Follows commands; Impaired decision making  Perception: Appears intact  Perseveration: No perseveration noted  Safety/Judgement: Decreased awareness of need for safety;Decreased insight into deficits    Hearing:   Auditory  Auditory Impairment: Hard of hearing, bilateral    Vision/Perceptual:    Not formally assessed; will continue to monitor as indicated                                Range of Motion:  AROM: Generally decreased, functional (Limited L hand)  PROM: Generally decreased, functional                      Strength:  Strength: Generally decreased, functional                Coordination:  Coordination: Generally decreased, functional  Fine Motor Skills-Upper: Left Impaired;Right Intact    Gross Motor Skills-Upper: Left Impaired;Right Intact    Tone & Sensation:  Tone: Normal                         Balance:  Sitting: Intact  Standing: Impaired  Standing - Static: Good;Constant support  Standing - Dynamic : Fair;Constant support    Functional Mobility and Transfers for ADLs:  Bed Mobility:  Rolling: Supervision  Supine to Sit: Supervision  Sit to Supine: Supervision  Scooting: Supervision    Transfers:  Sit to Stand: Contact guard assistance  Stand to Sit: Contact guard assistance  Toilet Transfer : Contact guard assistance (to stand to use urinal; recommend [facility])    ADL Assessment:  Feeding: Supervision;Setup    Oral Facial Hygiene/Grooming: Minimum assistance    Bathing: Moderate assistance    Upper Body Dressing: Minimum assistance    Lower Body Dressing: Maximum assistance    Toileting: Moderate assistance                  Functional Measure:  Barthel Index:    Bathin  Bladder: 5  Bowels: 5  Groomin  Dressin  Feeding: 10  Mobility: 0  Stairs: 0  Toilet Use: 0  Transfer (Bed to Chair and Back): 10  Total: 35/100        The Barthel ADL Index: Guidelines  1. The index should be used as a record of what a patient does, not as a record of what a patient could do. 2. The main aim is to establish degree of independence from any help, physical or verbal, however minor and for whatever reason. 3. The need for supervision renders the patient not independent. 4. A patient's performance should be established using the best available evidence. Asking the patient, friends/relatives and nurses are the usual sources, but direct observation and common sense are also important. However direct testing is not needed. 5. Usually the patient's performance over the preceding 24-48 hours is important, but occasionally longer periods will be relevant. 6. Middle categories imply that the patient supplies over 50 per cent of the effort. 7. Use of aids to be independent is allowed. Devendra Santana., Barthel, D.W. (2931). Functional evaluation: the Barthel Index. [facility] (14)2. YVON Maldonado, Deborah Montano.[place]., Cristian, [address] [place] [address] Ave (). Measuring the change indisability after inpatient rehabilitation; comparison of the responsiveness of the Barthel Index and Functional Brule Measure. Journal of Neurology, Neurosurgery, and Psychiatry, 66(4), 784-642. PIEDAD Zavala, LEO Gomez, & Logan Roberto M.A. (2004.) Assessment of post-stroke quality of life in cost-effectiveness studies: The usefulness of the Barthel Index and the EuroQoL-5D. Quality of Life Research, 15, 219-62        Occupational Therapy Evaluation Charge Determination   History Examination Decision-Making   LOW Complexity : Brief history review  MEDIUM Complexity : 3-5 performance deficits relating to physical, cognitive , or psychosocial skils that result in activity limitations and / or participation restrictions LOW Complexity : No comorbidities that affect functional and no verbal or physical assistance needed to complete eval tasks       Based on the above components, the patient evaluation is determined to be of the following complexity level: LOW   Pain Rating:  Patient c/o L UE pain; did not rate, but pain did not interfere with therapy. Activity Tolerance:   Poor, requires frequent rest breaks, and signs and symptoms of orthostatic hypotension    After treatment patient left in no apparent distress:    Supine in bed and Call bell within reach    COMMUNICATION/EDUCATION:   The patients plan of care was discussed with: Physical therapist and Registered nurse. Patient/family agree to work toward stated goals and plan of care. This patients plan of care is appropriate for delegation to [facility].     Thank you for this referral.  Nicole Carr OTR/L  Time Calculation: 26 mins

## 2021-08-20 NOTE — PROGRESS NOTES
08/20/21 1227 08/20/21 1235 08/20/21 1238   Vital Signs   Pulse (Heart Rate) (!) 59 72 66   Heart Rate Source Monitor Monitor Monitor   Level of Consciousness Alert (0)  --   --    /78 (!) 99/45 (!) 110/48   MAP (Calculated) 89 (!) 63 69   BP 1 Location Right upper arm Right upper arm Right upper arm   BP 1 Method Automatic Automatic Automatic   BP Patient Position Sitting Standing At rest   O2 Sat (%) 98 % (!) 87 % 97 %   O2 Device None (Room air) None (Room air) Nasal cannula   O2 Flow Rate (L/min)  --   --  4 l/min

## 2021-08-20 NOTE — PROGRESS NOTES
End of Shift Note    Bedside shift change report given to [address] [facility] Bernabe (oncoming nurse) by Marina Padilla (offgoing nurse). Report included the following information SBAR, Kardex, ED Summary, Intake/Output, MAR and Recent Results    Shift worked:  [id number]     Shift summary and any significant changes:    Stool sample send, PCR negative     Concerns for physician to address:       Zone phone for oncoming shift:          Activity:  Activity Level: Up with Assistance  Number times ambulated in hallways past shift: 0  Number of times OOB to chair past shift: 0    Cardiac:   Cardiac Monitoring: Yes      Cardiac Rhythm: Atrial Fib    Access:   Current line(s): PIV     Genitourinary:   Urinary status: voiding    Respiratory:   O2 Device: Nasal cannula  Chronic home O2 use?: NO  Incentive spirometer at bedside: NO     GI:  Last Bowel Movement Date: 08/19/21  Current diet:  ADULT DIET Regular; Low Fat/Low Chol/High Fiber/2 gm Na  Passing flatus: YES  Tolerating current diet: YES       Pain Management:   Patient states pain is manageable on current regimen: YES    Skin:  Ignacio Score: 20  Interventions: float heels, increase time out of bed and PT/OT consult    Patient Safety:  Fall Score:  Total Score: 2  Interventions: bed/chair alarm, gripper socks, pt to call before getting OOB and stay with me (per policy)       Length of Stay:  Expected LOS: - - -  Actual LOS: [address]

## 2021-08-20 NOTE — PROGRESS NOTES
Problem: Falls - Risk of  Goal: *Absence of Falls  Description: Document Cadence Candelario Fall Risk and appropriate interventions in the flowsheet.   Outcome: Progressing Towards Goal  Note: Fall Risk Interventions:  Mobility Interventions: Assess mobility with egress test, Bed/chair exit alarm, OT consult for ADLs, Patient to call before getting OOB, PT Consult for mobility concerns, PT Consult for assist device competence         Medication Interventions: Bed/chair exit alarm, Patient to call before getting OOB, Teach patient to arise slowly    Elimination Interventions: Call light in reach, Patient to call for help with toileting needs, Toileting schedule/hourly rounds, Urinal in reach    History of Falls Interventions: Bed/chair exit alarm, Consult care management for discharge planning, Door open when patient unattended

## 2021-08-21 LAB
ALBUMIN SERPL-MCNC: 2.8 G/DL (ref 3.5–5)
ALBUMIN/GLOB SERPL: 0.6 {RATIO} (ref 1.1–2.2)
ALP SERPL-CCNC: 70 U/L (ref 45–117)
ALT SERPL-CCNC: 10 U/L (ref 12–78)
ANION GAP SERPL CALC-SCNC: 3 MMOL/L (ref 5–15)
AST SERPL-CCNC: 18 U/L (ref 15–37)
BILIRUB SERPL-MCNC: 0.5 MG/DL (ref 0.2–1)
BUN SERPL-MCNC: 16 MG/DL (ref 6–20)
BUN/CREAT SERPL: 13 (ref 12–20)
CALCIUM SERPL-MCNC: 8.5 MG/DL (ref 8.5–10.1)
CAMPYLOBACTER SPECIES, DNA: NEGATIVE
CHLORIDE SERPL-SCNC: 104 MMOL/L (ref 97–108)
CO2 SERPL-SCNC: 29 MMOL/L (ref 21–32)
CREAT SERPL-MCNC: 1.21 MG/DL (ref 0.7–1.3)
ENTEROTOXIGEN E COLI, DNA: NEGATIVE
ERYTHROCYTE [DISTWIDTH] IN BLOOD BY AUTOMATED COUNT: 23.6 % (ref 11.5–14.5)
GLOBULIN SER CALC-MCNC: 4.4 G/DL (ref 2–4)
GLUCOSE SERPL-MCNC: 97 MG/DL (ref 65–100)
HCT VFR BLD AUTO: 42.1 % (ref 36.6–50.3)
HGB BLD-MCNC: 11.6 G/DL (ref 12.1–17)
MCH RBC QN AUTO: 20.5 PG (ref 26–34)
MCHC RBC AUTO-ENTMCNC: 27.6 G/DL (ref 30–36.5)
MCV RBC AUTO: 74.5 FL (ref 80–99)
NRBC # BLD: 0 K/UL (ref 0–0.01)
NRBC BLD-RTO: 0 PER 100 WBC
P SHIGELLOIDES DNA STL QL NAA+PROBE: NEGATIVE
PLATELET # BLD AUTO: 175 K/UL (ref 150–400)
POTASSIUM SERPL-SCNC: 4.6 MMOL/L (ref 3.5–5.1)
PROT SERPL-MCNC: 7.2 G/DL (ref 6.4–8.2)
RBC # BLD AUTO: 5.65 M/UL (ref 4.1–5.7)
SALMONELLA SPECIES, DNA: NEGATIVE
SHIGA TOXIN PRODUCING, DNA: NEGATIVE
SHIGELLA SP+EIEC IPAH STL QL NAA+PROBE: NEGATIVE
SODIUM SERPL-SCNC: 136 MMOL/L (ref 136–145)
VIBRIO SPECIES, DNA: NEGATIVE
WBC # BLD AUTO: 3.7 K/UL (ref 4.1–11.1)
Y. ENTEROCOLITICA, DNA: NEGATIVE

## 2021-08-21 PROCEDURE — 74011250636 HC RX REV CODE- 250/636: Performed by: HOSPITALIST

## 2021-08-21 PROCEDURE — 74011000258 HC RX REV CODE- 258: Performed by: HOSPITALIST

## 2021-08-21 PROCEDURE — 74011250636 HC RX REV CODE- 250/636: Performed by: STUDENT IN AN ORGANIZED HEALTH CARE EDUCATION/TRAINING PROGRAM

## 2021-08-21 PROCEDURE — 74011000258 HC RX REV CODE- 258: Performed by: STUDENT IN AN ORGANIZED HEALTH CARE EDUCATION/TRAINING PROGRAM

## 2021-08-21 PROCEDURE — 36415 COLL VENOUS BLD VENIPUNCTURE: CPT

## 2021-08-21 PROCEDURE — 65660000000 HC RM CCU STEPDOWN

## 2021-08-21 PROCEDURE — 77010033678 HC OXYGEN DAILY

## 2021-08-21 PROCEDURE — 74011636637 HC RX REV CODE- 636/637: Performed by: STUDENT IN AN ORGANIZED HEALTH CARE EDUCATION/TRAINING PROGRAM

## 2021-08-21 PROCEDURE — 80053 COMPREHEN METABOLIC PANEL: CPT

## 2021-08-21 PROCEDURE — 85027 COMPLETE CBC AUTOMATED: CPT

## 2021-08-21 PROCEDURE — 77030012341 HC CHMB SPCR OPTC MDI VYRM -A

## 2021-08-21 PROCEDURE — 94760 N-INVAS EAR/PLS OXIMETRY 1: CPT

## 2021-08-21 PROCEDURE — 74011250637 HC RX REV CODE- 250/637: Performed by: HOSPITALIST

## 2021-08-21 RX ORDER — PREDNISONE 20 MG/1
40 TABLET ORAL
Status: DISCONTINUED | OUTPATIENT
Start: 2021-08-21 | End: 2021-08-24 | Stop reason: HOSPADM

## 2021-08-21 RX ORDER — PREDNISONE 20 MG/1
40 TABLET ORAL
Status: DISCONTINUED | OUTPATIENT
Start: 2021-08-22 | End: 2021-08-21

## 2021-08-21 RX ADMIN — ASPIRIN 81 MG: 81 TABLET, COATED ORAL at 09:55

## 2021-08-21 RX ADMIN — FOLIC ACID 1 MG: 1 TABLET ORAL at 09:55

## 2021-08-21 RX ADMIN — FUROSEMIDE 80 MG: 40 TABLET ORAL at 09:55

## 2021-08-21 RX ADMIN — CARVEDILOL 6.25 MG: 6.25 TABLET, FILM COATED ORAL at 17:31

## 2021-08-21 RX ADMIN — CLOPIDOGREL BISULFATE 75 MG: 75 TABLET ORAL at 09:55

## 2021-08-21 RX ADMIN — PREDNISONE 40 MG: 20 TABLET ORAL at 12:10

## 2021-08-21 RX ADMIN — DOXYCYCLINE 100 MG: 100 INJECTION, POWDER, LYOPHILIZED, FOR SOLUTION INTRAVENOUS at 10:05

## 2021-08-21 RX ADMIN — PRAVASTATIN SODIUM 40 MG: 40 TABLET ORAL at 09:56

## 2021-08-21 RX ADMIN — CEFTRIAXONE 1 G: 1 INJECTION, POWDER, FOR SOLUTION INTRAMUSCULAR; INTRAVENOUS at 12:10

## 2021-08-21 RX ADMIN — ENOXAPARIN SODIUM 40 MG: 40 INJECTION SUBCUTANEOUS at 09:54

## 2021-08-21 RX ADMIN — ACETAMINOPHEN 650 MG: 325 TABLET ORAL at 14:34

## 2021-08-21 RX ADMIN — GABAPENTIN 600 MG: 300 CAPSULE ORAL at 17:30

## 2021-08-21 RX ADMIN — POTASSIUM CHLORIDE 20 MEQ: 750 TABLET, FILM COATED, EXTENDED RELEASE ORAL at 09:55

## 2021-08-21 RX ADMIN — POTASSIUM CHLORIDE 20 MEQ: 750 TABLET, FILM COATED, EXTENDED RELEASE ORAL at 17:30

## 2021-08-21 RX ADMIN — GABAPENTIN 600 MG: 300 CAPSULE ORAL at 09:56

## 2021-08-21 RX ADMIN — DOXYCYCLINE 100 MG: 100 INJECTION, POWDER, LYOPHILIZED, FOR SOLUTION INTRAVENOUS at 21:39

## 2021-08-21 RX ADMIN — Medication 10 ML: at 06:20

## 2021-08-21 RX ADMIN — DIGOXIN 0.12 MG: 125 TABLET ORAL at 09:56

## 2021-08-21 RX ADMIN — Medication 10 ML: at 17:31

## 2021-08-21 RX ADMIN — Medication 10 ML: at 21:39

## 2021-08-21 RX ADMIN — CARVEDILOL 6.25 MG: 6.25 TABLET, FILM COATED ORAL at 09:55

## 2021-08-21 NOTE — PROGRESS NOTES
Hospitalist Progress Note    NAME: Alesia Kern   :  1950   MRN:  [id number]       Assessment / Plan:  Acute hypoxic respiratory failure, POA due to  Right sided community acquired pneumonia, POA  COVID pcr negative Has been vaccinated with Moderna vaccine  Continue doxycycline and rocephin  Developed hives to levaquin in ER, treated with Benadryl  CTA chest negative PE, +RML consolidation and patchy opacification in RUL. Wean off oxygen as tolerated.     Left thumb and elbow gout flare up    --xray left hand with soft tissue swelling; denies any injury  MRI negative for any infection/Osteo  Likely is Gout  Started on Prednisone x 5 days  Discussed with ortho     Diarrhea, POA  Rule out C. diff  --recently on z-pack on  by pcp  C diff negative    Copd, without exac  --continue advair and spiriva     Chronic systolic chf EF 56-95%   Trace b/l leg edema, R calf larger than left  HTN  CAD  --continue lasix, potassium, digoxin  --continue aspirin, plavix, statin, coreg,     PAD hx right SFA stent 2014     Persistent afib  --rate controlled. Continue aspirin/plavix  --continue digoxin, check level     CKD stage 3  --Cr 1.3, was 1.75 in .   Baseline unkown, prior Cr 1-1.1 in   Will monitor    Neuropathy  --continue gabapentin     Colon cancer   --follow with Dr. Dennise Jarquin; colonoscopy      Hx of tongue cancer s/p resection, XRT and chemo 1 year ago per patient     Body mass index is 28.35 kg/m².     Code:  full  DVT prophylaxis: lovenox  Surrogate decision maker:  Jessica Eastman  Updated Marko Olea today     Estimated discharge date:   Barriers: if able to wean off oxygen     Subjective:     Chief Complaint / Reason for Physician Visit  Follow up for right pneumonia  He feels better today    Review of Systems:  Symptom Y/N Comments  Symptom Y/N Comments   Fever/Chills n   Chest Pain n    Poor Appetite n   Edema     Cough    Abdominal Pain     Sputum    Joint Pain SOB/RILEY    Pruritis/Rash     Nausea/vomit    Tolerating PT/OT     Diarrhea    Tolerating Diet     Constipation    Other       Could NOT obtain due to:      Objective:     VITALS:   Last 24hrs VS reviewed since prior progress note. Most recent are:  Patient Vitals for the past 24 hrs:   Temp Pulse Resp BP SpO2   08/21/21 1217 98.6 °F (37 °C) 72 18 (!) 119/55 96 %   08/21/21 0743 98.2 °F (36.8 °C) 70 18 (!) 111/52 95 %   08/21/21 0433 99.1 °F (37.3 °C) (!) 58 17 (!) 105/42 99 %   08/21/21 0248 97.9 °F (36.6 °C) 60 16 (!) 102/41 96 %   08/21/21 0054 97.7 °F (36.5 °C) 68 18 113/60 92 %   08/20/21 2041 98.2 °F (36.8 °C) 65 18 (!) 113/51 94 %   08/20/21 1724    (!) 126/51    08/20/21 1600  67      08/20/21 1451 98 °F (36.7 °C) 67 17 (!) 108/56 95 %       Intake/Output Summary (Last 24 hours) at 8/21/2021 1344  Last data filed at 8/20/2021 2042  Gross per 24 hour   Intake 240 ml   Output 800 ml   Net -560 ml        I had a face to face encounter and independently examined this patient on 8/21/2021, as outlined below:  PHYSICAL EXAM:  General: WD, WN. Alert, cooperative, no acute distress    EENT:  EOMI. Anicteric sclerae. MMM  Resp:  CTA bilaterally, no wheezing or rales. No accessory muscle use  CV:  Regular  rhythm,  No edema  GI:  Soft, Non distended, Non tender. +Bowel sounds  Neurologic:  Alert and oriented X 2, normal speech,   Psych:   Good insight. Not anxious nor agitated  Skin:  No rashes.   No jaundice    Reviewed most current lab test results and cultures  YES  Reviewed most current radiology test results   YES  Review and summation of old records today    NO  Reviewed patient's current orders and MAR    YES  PMH/SH reviewed - no change compared to H&P  ________________________________________________________________________  Care Plan discussed with:    Comments   Patient y    Family      RN y    Care Manager     Consultant                        Multidiciplinary team rounds were held today with , nursing, pharmacist and clinical coordinator. Patient's plan of care was discussed; medications were reviewed and discharge planning was addressed. ________________________________________________________________________  Total NON critical care TIME: 35 Minutes    Total CRITICAL CARE TIME Spent:   Minutes non procedure based      Comments   >50% of visit spent in counseling and coordination of care     ________________________________________________________________________  Sandra Solo MD     Procedures: see electronic medical records for all procedures/Xrays and details which were not copied into this note but were reviewed prior to creation of Plan. LABS:  I reviewed today's most current labs and imaging studies.   Pertinent labs include:  Recent Labs     08/21/21  0250 08/20/21  0159 08/19/21  1010   WBC 3.7* 3.7* 4.9   HGB 11.6* 10.8* 10.0*   HCT 42.1 38.5 34.3*    193 223     Recent Labs     08/21/21  0250 08/20/21  0159 08/19/21  1010    136 136   K 4.6 4.5 3.9    104 104   CO2 29 27 28   GLU 97 117* 94   BUN 16 17 18   CREA 1.21 1.18 1.30   CA 8.5 8.5 8.8   ALB 2.8*  --  3.2*   TBILI 0.5  --  1.2*   ALT 10*  --  11*       Signed: Sandra Solo MD

## 2021-08-21 NOTE — PROGRESS NOTES
Ortho:  Left thumb and elbow  Some improvement per pt  Left thumb with persistent swelling, +TTP dorsal MP joint  No sig erythema or streaking  MRI studies reviewed    Likely gouty arthropathy   Continue elevation, gentle ROM  Discussed treatment recommendation with hospitalist       Plan per Dr Raquel Vines PA-C

## 2021-08-21 NOTE — PROGRESS NOTES
End of Shift Note    Bedside shift change report given to [facility]  RN (oncoming nurse) by Terry Lee RN (offgoing nurse). Report included the following information SBAR, Kardex, Intake/Output, MAR, Accordion, Recent Results and Med Rec Status    Shift worked:  7PM-7AM     Shift summary and any significant changes:     No significant changes.  No BM       Concerns for physician to address:     Zone phone for oncoming shift:            Terry Lee RN

## 2021-08-21 NOTE — PROGRESS NOTES
TRANSFER - OUT REPORT:    Verbal report given to [address] [facility] being transferred to [facility] for routine progression of care       Report consisted of patients Situation, Background, Assessment and   Recommendations(SBAR). Information from the following report(s) SBAR, Kardex, Intake/Output, MAR and Recent Results was reviewed with the receiving nurse. Lines:   Peripheral IV 08/19/21 Left Antecubital (Active)   Site Assessment Clean, dry, & intact 08/21/21 1050   Phlebitis Assessment 0 08/21/21 1050   Infiltration Assessment 0 08/21/21 1050   Dressing Status Clean, dry, & intact 08/21/21 1050   Dressing Type Transparent;Tape 08/21/21 1050   Hub Color/Line Status Pink;Flushed 08/21/21 1050   Action Taken Open ports on tubing capped 08/20/21 1452   Alcohol Cap Used Yes 08/20/21 1452        Opportunity for questions and clarification was provided.       Patient transported with:   O2 @ 3 liters

## 2021-08-22 PROCEDURE — 74011250636 HC RX REV CODE- 250/636: Performed by: STUDENT IN AN ORGANIZED HEALTH CARE EDUCATION/TRAINING PROGRAM

## 2021-08-22 PROCEDURE — 74011000258 HC RX REV CODE- 258: Performed by: HOSPITALIST

## 2021-08-22 PROCEDURE — 74011250637 HC RX REV CODE- 250/637: Performed by: HOSPITALIST

## 2021-08-22 PROCEDURE — 94640 AIRWAY INHALATION TREATMENT: CPT

## 2021-08-22 PROCEDURE — 74011000258 HC RX REV CODE- 258: Performed by: STUDENT IN AN ORGANIZED HEALTH CARE EDUCATION/TRAINING PROGRAM

## 2021-08-22 PROCEDURE — 94760 N-INVAS EAR/PLS OXIMETRY 1: CPT

## 2021-08-22 PROCEDURE — 74011250636 HC RX REV CODE- 250/636: Performed by: HOSPITALIST

## 2021-08-22 PROCEDURE — 77010033678 HC OXYGEN DAILY

## 2021-08-22 PROCEDURE — 74011636637 HC RX REV CODE- 636/637: Performed by: STUDENT IN AN ORGANIZED HEALTH CARE EDUCATION/TRAINING PROGRAM

## 2021-08-22 PROCEDURE — 65660000000 HC RM CCU STEPDOWN

## 2021-08-22 RX ORDER — FUROSEMIDE 10 MG/ML
60 INJECTION INTRAMUSCULAR; INTRAVENOUS ONCE
Status: DISCONTINUED | OUTPATIENT
Start: 2021-08-22 | End: 2021-08-22

## 2021-08-22 RX ORDER — FUROSEMIDE 10 MG/ML
40 INJECTION INTRAMUSCULAR; INTRAVENOUS EVERY 12 HOURS
Status: DISCONTINUED | OUTPATIENT
Start: 2021-08-22 | End: 2021-08-24 | Stop reason: HOSPADM

## 2021-08-22 RX ADMIN — POTASSIUM CHLORIDE 20 MEQ: 750 TABLET, FILM COATED, EXTENDED RELEASE ORAL at 09:19

## 2021-08-22 RX ADMIN — FUROSEMIDE 40 MG: 10 INJECTION, SOLUTION INTRAMUSCULAR; INTRAVENOUS at 14:53

## 2021-08-22 RX ADMIN — ENOXAPARIN SODIUM 40 MG: 40 INJECTION SUBCUTANEOUS at 09:19

## 2021-08-22 RX ADMIN — DIGOXIN 0.12 MG: 125 TABLET ORAL at 09:20

## 2021-08-22 RX ADMIN — FLUTICASONE FUROATE AND VILANTEROL TRIFENATATE 1 PUFF: 200; 25 POWDER RESPIRATORY (INHALATION) at 08:38

## 2021-08-22 RX ADMIN — CARVEDILOL 6.25 MG: 6.25 TABLET, FILM COATED ORAL at 17:20

## 2021-08-22 RX ADMIN — CEFTRIAXONE 1 G: 1 INJECTION, POWDER, FOR SOLUTION INTRAMUSCULAR; INTRAVENOUS at 12:50

## 2021-08-22 RX ADMIN — FUROSEMIDE 40 MG: 10 INJECTION, SOLUTION INTRAMUSCULAR; INTRAVENOUS at 20:38

## 2021-08-22 RX ADMIN — TIOTROPIUM BROMIDE INHALATION SPRAY 2 PUFF: 3.12 SPRAY, METERED RESPIRATORY (INHALATION) at 08:41

## 2021-08-22 RX ADMIN — GABAPENTIN 600 MG: 300 CAPSULE ORAL at 09:20

## 2021-08-22 RX ADMIN — FOLIC ACID 1 MG: 1 TABLET ORAL at 09:19

## 2021-08-22 RX ADMIN — FUROSEMIDE 80 MG: 40 TABLET ORAL at 09:20

## 2021-08-22 RX ADMIN — GABAPENTIN 600 MG: 300 CAPSULE ORAL at 17:20

## 2021-08-22 RX ADMIN — POTASSIUM CHLORIDE 20 MEQ: 750 TABLET, FILM COATED, EXTENDED RELEASE ORAL at 17:20

## 2021-08-22 RX ADMIN — DOXYCYCLINE 100 MG: 100 INJECTION, POWDER, LYOPHILIZED, FOR SOLUTION INTRAVENOUS at 09:19

## 2021-08-22 RX ADMIN — DOXYCYCLINE 100 MG: 100 INJECTION, POWDER, LYOPHILIZED, FOR SOLUTION INTRAVENOUS at 20:38

## 2021-08-22 RX ADMIN — Medication 10 ML: at 14:54

## 2021-08-22 RX ADMIN — PREDNISONE 40 MG: 20 TABLET ORAL at 09:20

## 2021-08-22 RX ADMIN — CARVEDILOL 6.25 MG: 6.25 TABLET, FILM COATED ORAL at 09:20

## 2021-08-22 RX ADMIN — ASPIRIN 81 MG: 81 TABLET, COATED ORAL at 09:20

## 2021-08-22 RX ADMIN — PRAVASTATIN SODIUM 40 MG: 40 TABLET ORAL at 09:20

## 2021-08-22 RX ADMIN — Medication 10 ML: at 05:49

## 2021-08-22 RX ADMIN — Medication 10 ML: at 20:44

## 2021-08-22 RX ADMIN — CLOPIDOGREL BISULFATE 75 MG: 75 TABLET ORAL at 09:20

## 2021-08-22 NOTE — PROGRESS NOTES
Orthopedic Surgery Progress Note  Post Op Day: * No surgery found *    2021 8:55 AM     Patient Name: Ace Sharpe      Subjective:  Ace Sharpe is a 69 y.o. male  With improving left elbow and left thumb pain. Minimal swelling today. Patient reports improvement in range of motion. Procedure:  * No surgery found *        Objective:     General: Alert, cooperative, no distress. Musculoskeletal:     Left thumb and elbow:  Mild TTP posterior elbow joint, no swelling, no erythema, normal temperature  Left thumb with persistent swelling, +TTP dorsal MP joint, No sig erythema or streaking  Compartments soft and nontender      Vital Signs:    Patient Vitals for the past 8 hrs:   BP Temp Pulse Resp SpO2   21 0841     91 %   21 0838     (!) 89 %   21 0812 (!) 141/63 98.2 °F (36.8 °C) 69 18 94 %   21 0254 137/61 98.3 °F (36.8 °C) 68 18 95 %     Temp (24hrs), Av.2 °F (36.8 °C), Min:97.2 °F (36.2 °C), Max:98.6 °F (37 °C)      Intake/Output:  No intake/output data recorded.    1901 -  0700  In: -   Out: 1450 [Urine:1450]    Pain Control:   Pain Assessment  Pain Scale 1: Numeric (0 - 10)  Pain Intensity 1: 0  Pain Onset 1: pta  Pain Location 1: Elbow  Pain Orientation 1: Right  Pain Description 1: Aching  Pain Intervention(s) 1: Medication (see MAR)    LAB:    Recent Labs     21  0250   HCT 42.1   HGB 11.6*     Lab Results   Component Value Date/Time    Sodium 136 2021 02:50 AM    Potassium 4.6 2021 02:50 AM    Chloride 104 2021 02:50 AM    CO2 29 2021 02:50 AM    Glucose 97 2021 02:50 AM    BUN 16 2021 02:50 AM    Creatinine 1.21 2021 02:50 AM    Calcium 8.5 2021 02:50 AM            Assessment:   Gout, left elbow and left thumb      Plan:     Agree with likely gout  -improving ROM and pain today  -no indication of infx, erythema, streaking, etc  -continue working on ROM and elevation  -prednisone significantly helping improve sx  -signing off from ortho standpoint        Signed By: Khloe Echols PA-C      Supervising Physician: Dr. Francois Linker

## 2021-08-22 NOTE — PROGRESS NOTES
Problem: Falls - Risk of  Goal: *Absence of Falls  Description: Document Betty Alejo Fall Risk and appropriate interventions in the flowsheet.   Outcome: Progressing Towards Goal  Note: Fall Risk Interventions:  Mobility Interventions: Bed/chair exit alarm, Patient to call before getting OOB         Medication Interventions: Bed/chair exit alarm, Patient to call before getting OOB    Elimination Interventions: Bed/chair exit alarm, Call light in reach, Patient to call for help with toileting needs    History of Falls Interventions: Bed/chair exit alarm

## 2021-08-22 NOTE — PROGRESS NOTES
Transition of Care Plan:     RUR: 15%  Disposition: Home with [facility]  Follow up appointments: PCP  DME needed: TBD  Transportation at Discharge: Patient daughter Jory Read to transport   [address] or means to access home:    Yes    IM Medicare Letter: 2nd IM letter before discharge   Is patient a BCPI-A Bundle: N/A                     If yes, was Bundle Letter given?: N/A     Caregiver Contact: Daughter Jory Read [phone]  Discharge Caregiver contacted prior to discharge? Unit CM to follow up before discharge    11:09 a.m. CM spoke with pt regarding HH. Pt is in agreement. Pt does not have a preference. CM reviewed FOC and placed sign copy on chart. CM will send referrals. CM received a call from the attending physician that pt may d/c today if he is able to ween off o2. Attending physician stated pt will need HH. CM will speak with pt regarding HH.       Tez Becerra,   Care Management, ED [facility]  [phone]

## 2021-08-22 NOTE — PROGRESS NOTES
End of Shift Note    Bedside shift change report given to [address] [place] [address] (oncoming nurse) by Shanta Baez (offgoing nurse). Report included the following information SBAR, Kardex, MAR and Recent Results    Shift worked:  night   Shift summary and any significant changes:     none, quiet night       Concerns for physician to address:  none   Zone phone for oncoming shift:   3500     Patient Information  Svetlana Lynch  69 y.o.  8/19/2021  9:35 AM by Lilian Nunez MD. Svetlana Lynch was admitted from Home    Problem List  Patient Active Problem List    Diagnosis Date Noted    Diarrhea 08/19/2021    Pneumonia 08/19/2021    Cellulitis of left hand 08/19/2021    Acute respiratory failure with hypoxia ([address] 08/19/2021    PAD (peripheral artery disease) ([address] 10/29/2014    Acute on chronic systolic heart failure ([address] 01/06/2014    Atrial flutter ([address] 10/30/2010    COPD (chronic obstructive pulmonary disease) ([address] 10/30/2010    Dilated cardiomyopathy ([address] 10/30/2010    ETOH abuse 10/30/2010     Past Medical History:   Diagnosis Date    Atrial fibrillation ([address]     CAD (coronary artery disease)     Cancer ([address]     Colon CA 2014 - polyp removed/chemo/radiation    Chronic obstructive pulmonary disease ([address]     GERD (gastroesophageal reflux disease)     Heart failure ([address]     Hypertension     Sleep apnea     not using machine because of surgery on tongue       Core Measures:  CVA: No Not applicable  CHF:Yes No  PNA:No Not applicable    Activity:  Activity Level: Up with Assistance  Number times ambulated in hallways past shift: 0  Number of times OOB to chair past shift: 0    Cardiac:   Cardiac Monitoring: Yes      Cardiac Rhythm: Atrial Fib    Access:   Current line(s): PIV   Central Line? No   PICC LINE? No     Genitourinary:   Urinary status: voiding  Urinary Catheter?  No     Respiratory:   O2 Device: Nasal cannula  Chronic home O2 use?: NO  Incentive spirometer at bedside: N/A       GI:  Last Bowel Movement Date: 08/19/21  Current diet:  ADULT DIET Regular; Low Fat/Low Chol/High Fiber/2 gm Na  Passing flatus: YES  Tolerating current diet: YES       Pain Management:   Patient states pain is manageable on current regimen: YES    Skin:  Ignacio Score: 21  Interventions: PT/OT consult    Patient Safety:  Fall Score:  Total Score: 3  Interventions: bed/chair alarm and pt to call before getting OOB  High Fall Risk: Yes  @Rollbelt  @dexterity to release roll belt  Yes/No ( must document dexterity  here by stating Yes or No here, otherwise this is a restraint and must follow restraint documentation policy.)    DVT prophylaxis:  DVT prophylaxis Med- Yes  DVT prophylaxis SCD or DIONTE- No     Wounds: (If Applicable)  Wounds- Yes  Location abrasion right knee scabbed over    Active Consults:  IP CONSULT TO ORTHOPEDIC SURGERY    Length of Stay:  Expected LOS: 4d 2h  Actual LOS: 3  Discharge Plan: Yes per CM note of 8/19, discharge home with daughter to transport when medically discharged      Raven Travis

## 2021-08-23 LAB
ANION GAP SERPL CALC-SCNC: 4 MMOL/L (ref 5–15)
BUN SERPL-MCNC: 27 MG/DL (ref 6–20)
BUN/CREAT SERPL: 23 (ref 12–20)
CALCIUM SERPL-MCNC: 9.1 MG/DL (ref 8.5–10.1)
CHLORIDE SERPL-SCNC: 102 MMOL/L (ref 97–108)
CO2 SERPL-SCNC: 31 MMOL/L (ref 21–32)
CREAT SERPL-MCNC: 1.17 MG/DL (ref 0.7–1.3)
GLUCOSE SERPL-MCNC: 124 MG/DL (ref 65–100)
POTASSIUM SERPL-SCNC: 5.2 MMOL/L (ref 3.5–5.1)
SODIUM SERPL-SCNC: 137 MMOL/L (ref 136–145)

## 2021-08-23 PROCEDURE — 74011000258 HC RX REV CODE- 258: Performed by: HOSPITALIST

## 2021-08-23 PROCEDURE — 74011636637 HC RX REV CODE- 636/637: Performed by: STUDENT IN AN ORGANIZED HEALTH CARE EDUCATION/TRAINING PROGRAM

## 2021-08-23 PROCEDURE — 74011250636 HC RX REV CODE- 250/636: Performed by: STUDENT IN AN ORGANIZED HEALTH CARE EDUCATION/TRAINING PROGRAM

## 2021-08-23 PROCEDURE — 97530 THERAPEUTIC ACTIVITIES: CPT

## 2021-08-23 PROCEDURE — 74011250636 HC RX REV CODE- 250/636: Performed by: HOSPITALIST

## 2021-08-23 PROCEDURE — 65660000000 HC RM CCU STEPDOWN

## 2021-08-23 PROCEDURE — 74011250637 HC RX REV CODE- 250/637: Performed by: HOSPITALIST

## 2021-08-23 PROCEDURE — 80048 BASIC METABOLIC PNL TOTAL CA: CPT

## 2021-08-23 PROCEDURE — 94760 N-INVAS EAR/PLS OXIMETRY 1: CPT

## 2021-08-23 PROCEDURE — 36415 COLL VENOUS BLD VENIPUNCTURE: CPT

## 2021-08-23 PROCEDURE — 74011000258 HC RX REV CODE- 258: Performed by: STUDENT IN AN ORGANIZED HEALTH CARE EDUCATION/TRAINING PROGRAM

## 2021-08-23 PROCEDURE — 94640 AIRWAY INHALATION TREATMENT: CPT

## 2021-08-23 PROCEDURE — 97116 GAIT TRAINING THERAPY: CPT

## 2021-08-23 PROCEDURE — 77010033678 HC OXYGEN DAILY

## 2021-08-23 RX ADMIN — GABAPENTIN 600 MG: 300 CAPSULE ORAL at 17:15

## 2021-08-23 RX ADMIN — Medication 10 ML: at 21:43

## 2021-08-23 RX ADMIN — Medication 10 ML: at 09:33

## 2021-08-23 RX ADMIN — CEFTRIAXONE 1 G: 1 INJECTION, POWDER, FOR SOLUTION INTRAMUSCULAR; INTRAVENOUS at 14:44

## 2021-08-23 RX ADMIN — DOXYCYCLINE 100 MG: 100 INJECTION, POWDER, LYOPHILIZED, FOR SOLUTION INTRAVENOUS at 09:28

## 2021-08-23 RX ADMIN — CARVEDILOL 6.25 MG: 6.25 TABLET, FILM COATED ORAL at 09:30

## 2021-08-23 RX ADMIN — PREDNISONE 40 MG: 20 TABLET ORAL at 09:30

## 2021-08-23 RX ADMIN — Medication 10 ML: at 05:32

## 2021-08-23 RX ADMIN — GABAPENTIN 600 MG: 300 CAPSULE ORAL at 09:29

## 2021-08-23 RX ADMIN — TIOTROPIUM BROMIDE INHALATION SPRAY 2 PUFF: 3.12 SPRAY, METERED RESPIRATORY (INHALATION) at 07:25

## 2021-08-23 RX ADMIN — POTASSIUM CHLORIDE 20 MEQ: 750 TABLET, FILM COATED, EXTENDED RELEASE ORAL at 09:30

## 2021-08-23 RX ADMIN — FUROSEMIDE 40 MG: 10 INJECTION, SOLUTION INTRAMUSCULAR; INTRAVENOUS at 21:41

## 2021-08-23 RX ADMIN — ENOXAPARIN SODIUM 40 MG: 40 INJECTION SUBCUTANEOUS at 09:29

## 2021-08-23 RX ADMIN — ASPIRIN 81 MG: 81 TABLET, COATED ORAL at 09:30

## 2021-08-23 RX ADMIN — PRAVASTATIN SODIUM 40 MG: 40 TABLET ORAL at 09:30

## 2021-08-23 RX ADMIN — FLUTICASONE FUROATE AND VILANTEROL TRIFENATATE 1 PUFF: 200; 25 POWDER RESPIRATORY (INHALATION) at 07:25

## 2021-08-23 RX ADMIN — DOXYCYCLINE 100 MG: 100 INJECTION, POWDER, LYOPHILIZED, FOR SOLUTION INTRAVENOUS at 21:44

## 2021-08-23 RX ADMIN — FOLIC ACID 1 MG: 1 TABLET ORAL at 09:30

## 2021-08-23 RX ADMIN — POTASSIUM CHLORIDE 20 MEQ: 750 TABLET, FILM COATED, EXTENDED RELEASE ORAL at 17:15

## 2021-08-23 RX ADMIN — CLOPIDOGREL BISULFATE 75 MG: 75 TABLET ORAL at 09:30

## 2021-08-23 RX ADMIN — CARVEDILOL 6.25 MG: 6.25 TABLET, FILM COATED ORAL at 17:15

## 2021-08-23 RX ADMIN — DIGOXIN 0.12 MG: 125 TABLET ORAL at 09:30

## 2021-08-23 RX ADMIN — Medication 10 ML: at 14:45

## 2021-08-23 RX ADMIN — FUROSEMIDE 40 MG: 10 INJECTION, SOLUTION INTRAMUSCULAR; INTRAVENOUS at 09:29

## 2021-08-23 NOTE — PROGRESS NOTES
1035: Physical therapy at bedside to do oxygen challenge with patient    1135: Patient will need oxygen for home per PT    1430: Patient on room air now while resting in bed and oxygen saturations at 93%. Will need oxygen with activity PRN    1600: Insurance still pending for oxygen needs at home for patient. MD updated patient over the phone    [id number]: Patient tongue bleeding. Stated he bit while eating. Patient washed out mouth with water and pressure applied to site bleeding.  Updated MD. No new orders placed

## 2021-08-23 NOTE — PROGRESS NOTES
End of Shift Note    Bedside shift change report given to Keith Bentley RN (oncoming nurse) by Dakota Pantoja (offgoing nurse). Report included the following information SBAR, Kardex, MAR and Recent Results    Shift worked:  night   Shift summary and any significant changes:     none       Concerns for physician to address:  none   Zone phone for oncoming shift:   0125     Patient Information  Tita Eng  69 y.o.  8/19/2021  9:35 AM by Sunil Garcia MD. Tita Eng was admitted from Home    Problem List  Patient Active Problem List    Diagnosis Date Noted    Diarrhea 08/19/2021    Pneumonia 08/19/2021    Cellulitis of left hand 08/19/2021    Acute respiratory failure with hypoxia ([address] 08/19/2021    PAD (peripheral artery disease) ([address] 10/29/2014    Acute on chronic systolic heart failure ([address] 01/06/2014    Atrial flutter ([address] 10/30/2010    COPD (chronic obstructive pulmonary disease) ([address] 10/30/2010    Dilated cardiomyopathy ([address] 10/30/2010    ETOH abuse 10/30/2010     Past Medical History:   Diagnosis Date    Atrial fibrillation ([address]     CAD (coronary artery disease)     Cancer ([address]     Colon CA 2014 - polyp removed/chemo/radiation    Chronic obstructive pulmonary disease ([address]     GERD (gastroesophageal reflux disease)     Heart failure ([address]     Hypertension     Sleep apnea     not using machine because of surgery on tongue       Core Measures:  CVA: No Not applicable  CHF:Yes No  PNA:No Not applicable    Activity:  Activity Level: Up with Assistance  Number times ambulated in hallways past shift: 0  Number of times OOB to chair past shift: 0    Cardiac:   Cardiac Monitoring: Yes      Cardiac Rhythm: Atrial Fib    Access:   Current line(s): PIV   Central Line? No   PICC LINE? No     Genitourinary:   Urinary status: voiding  Urinary Catheter?  No     Respiratory:   O2 Device: Nasal cannula  Chronic home O2 use?: NO  Incentive spirometer at bedside: N/A       GI:  Last Bowel Movement Date: 08/19/21  Current diet:  ADULT DIET Regular; Low Fat/Low Chol/High Fiber/2 gm Na  Passing flatus: YES  Tolerating current diet: YES       Pain Management:   Patient states pain is manageable on current regimen: YES    Skin:  Ignacio Score: 22  Interventions: PT/OT consult    Patient Safety:  Fall Score:  Total Score: 4  Interventions: bed/chair alarm and pt to call before getting OOB  High Fall Risk: Yes  @Rollbelt  @dexterity to release roll belt  Yes/No ( must document dexterity  here by stating Yes or No here, otherwise this is a restraint and must follow restraint documentation policy.)    DVT prophylaxis:  DVT prophylaxis Med- Yes  DVT prophylaxis SCD or DIONTE- No     Wounds: (If Applicable)  Wounds- Yes  Location abrasion right knee scabbed over    Active Consults:  IP CONSULT TO ORTHOPEDIC SURGERY    Length of Stay:  Expected LOS: 4d 2h  Actual LOS: 3  Discharge Plan: Yes per CM note of 8/19, discharge home with daughter to transport when medically discharged      Nicholas Means

## 2021-08-23 NOTE — PROGRESS NOTES
Transition of Care Plan:    RUR: 17%  Disposition: Home with family support-HHC (acceptance pending) and Home O2 (insurance pending-[employer])  Follow up appointments: Follow up appointment with PCP and/or Specialist   DME needed: TBD by therapist-Pt has CPAP at home and will require Home O2  Transportation at Discharge: Family to assist with transport   [address] or means to access home:      Family will have keys to enter home   IM Medicare Letter: 2nd  Medicare Letter to be given   Is patient a BCPI-A Bundle: If yes, was Bundle Letter given?:     Caregiver Contact: Ellen Cloud (daughter) [phone]  Discharge Caregiver contacted prior to discharge? Family to be contacted    UPDATE: 2:40PM      CM followed up with Tanya Diaz, provided by [facility] and pt was accepted to their agency for his home care needs. CM followed up with  [facility] [place], and insurance Natalie Billing is currently pending. CM will inform physician of the following. NATALYA Ralph, [place]      UPDATE: 1:02PM    SCOTTY informed that pt's Home O2 ([facility])  and HHC ([facility]) clinicals are both currently pending insurance auth. CM will continue to follow. NATALYA Ralph, [place]          INITIAL NOTE: CM: Jason Eli is currently working with pt in the Neuro Unit. SCOTTY informed during IDRs, that pt will benefit from [facility] a the time of d/c and HHC. CM made aware that therapist will work with the pt and complete SAT testing with pt to determine his baseline. CM completed room visit with pt, to make him aware that Home O2 is being required at the time of d/c. Pt reported that he has CPAP machine at home that he does not wear nightly, and he use to have Home O2 as well. Pt will complete SAT test to determine his baseline for O2 needs. Pt reported that his family is supportive and that they can assist with his additional care at home. CM will contact pt;s daughter to inform her of the following.     SCOTTY spoke with Yasmin Segovia, via telephone regarding pt's d/c needs and plans. Ramu agreeable to recommendations of HHC and Home O2. Lety  reported that pt has had O2 in the past, but couldn't remember who provided O2 to pt. Lety  reported that she will be able to transport pt home at the time of d/c.    SCOTTY sent referrals to [facility] agency: [facility] (acceptance pending) for pt's home needs. SCOTTY sent referral to [address] [place] [address] for Home O2 tanks, acceptance is currently pending. SCOTTY will continue to follow.     Angy Ellis, NATALYA, [address] [place] [address]

## 2021-08-23 NOTE — PROGRESS NOTES
Hospitalist Progress Note    NAME: Heather Yang   :  1950   MRN:  [id number]       Assessment / Plan:  Acute hypoxic respiratory failure, POA due to  Right sided community acquired pneumonia, POA    COVID pcr negative Has been vaccinated with Moderna vaccine  Continue doxycycline and rocephin  Developed hives to levaquin in ER, treated with Benadryl  CTA chest negative PE, +RML consolidation and patchy opacification in RUL. Oxygen challenge again today, needs oxygen on ambulation, CM setting it up.     Left thumb and elbow gout flare up    --xray left hand with soft tissue swelling; denies any injury  MRI negative for any infection/Osteo  Likely is Gout  Started on Prednisone x 5 days  Discussed with ortho     Diarrhea, POA  Rule out C. diff  --recently on z-pack on  by pcp  C diff negative    Copd, without exac  --continue advair and spiriva     Chronic systolic chf EF 15-72%   HTN  CAD  Will need repeat ECHO, wants to do this as outpatient with f/u with cardiology. Switch lasix to PO  --continue lasix, potassium, digoxin  --continue aspirin, plavix, statin, coreg,     PAD hx right SFA stent 2014     Persistent afib  --rate controlled. Continue aspirin/plavix  --continue digoxin, check level slightly low     CKD stage 3  --Cr 1.3, was 1.75 in . Baseline unkown, prior Cr 1-1.1 in   Improved.     Neuropathy  --continue gabapentin     Colon cancer   --follow with Dr. Nicole Gomez; colonoscopy      Hx of tongue cancer s/p resection, XRT and chemo 1 year ago per patient     Body mass index is 28.35 kg/m².     Code:  full  DVT prophylaxis: lovenox  Surrogate decision maker:  daughter Suyapa Denton     Estimated discharge date:   Barriers: Needs home oxygen set up, insurance auth pending     Subjective:     Chief Complaint / Reason for Physician Visit  Follow up for right pneumonia  He is very eager to go home today    Review of Systems:  Symptom Y/N Comments  Symptom Y/N Comments   Fever/Chills n   Chest Pain n    Poor Appetite n   Edema     Cough    Abdominal Pain     Sputum    Joint Pain     SOB/RILEY    Pruritis/Rash     Nausea/vomit    Tolerating PT/OT     Diarrhea    Tolerating Diet     Constipation    Other       Could NOT obtain due to:      Objective:     VITALS:   Last 24hrs VS reviewed since prior progress note. Most recent are:  Patient Vitals for the past 24 hrs:   Temp Pulse Resp BP SpO2   08/23/21 1510 98 °F (36.7 °C) 65 20  96 %   08/23/21 1136 97.6 °F (36.4 °C) 68 20 134/68 100 %   08/23/21 0941  62  123/65    08/23/21 0820 97.7 °F (36.5 °C) 73 20 (!) 199/88 99 %   08/23/21 0728     98 %   08/23/21 0251 98.4 °F (36.9 °C) 78 18 (!) 154/69 94 %   08/22/21 2248 97.8 °F (36.6 °C) 87 18 128/61 94 %   08/22/21 2048     97 %   08/22/21 1929 97.4 °F (36.3 °C) 73 18 118/87 95 %   08/22/21 1700 97.7 °F (36.5 °C) 78 17 122/64 94 %       Intake/Output Summary (Last 24 hours) at 8/23/2021 1609  Last data filed at 8/23/2021 1510  Gross per 24 hour   Intake 1600 ml   Output 1770 ml   Net -170 ml        I had a face to face encounter and independently examined this patient on 8/23/2021, as outlined below:  PHYSICAL EXAM:  General: WD, WN. Alert, cooperative, no acute distress    EENT:  EOMI. Anicteric sclerae. MMM  Resp:  CTA bilaterally, no wheezing or rales. No accessory muscle use  CV:  Regular  rhythm,  No edema  GI:  Soft, Non distended, Non tender. +Bowel sounds  Neurologic:  Alert and oriented X 2, normal speech,   Psych:   Good insight. Not anxious nor agitated  Skin:  No rashes.   No jaundice    Reviewed most current lab test results and cultures  YES  Reviewed most current radiology test results   YES  Review and summation of old records today    NO  Reviewed patient's current orders and MAR    YES  PMH/SH reviewed - no change compared to H&P  ________________________________________________________________________  Care Plan discussed with:    Comments Patient y    Family      RN y    Care Manager     Consultant                        Multidiciplinary team rounds were held today with , nursing, pharmacist and clinical coordinator. Patient's plan of care was discussed; medications were reviewed and discharge planning was addressed. ________________________________________________________________________  Total NON critical care TIME: 35 Minutes    Total CRITICAL CARE TIME Spent:   Minutes non procedure based      Comments   >50% of visit spent in counseling and coordination of care     ________________________________________________________________________  Jamaica Anne MD     Procedures: see electronic medical records for all procedures/Xrays and details which were not copied into this note but were reviewed prior to creation of Plan. LABS:  I reviewed today's most current labs and imaging studies.   Pertinent labs include:  Recent Labs     08/21/21  0250   WBC 3.7*   HGB 11.6*   HCT 42.1        Recent Labs     08/23/21  0217 08/21/21  0250    136   K 5.2* 4.6    104   CO2 31 29   * 97   BUN 27* 16   CREA 1.17 1.21   CA 9.1 8.5   ALB  --  2.8*   TBILI  --  0.5   ALT  --  10*       Signed: Jamaica Anne MD

## 2021-08-23 NOTE — PROGRESS NOTES
Problem: Mobility Impaired (Adult and Pediatric)  Goal: *Acute Goals and Plan of Care (Insert Text)  Description: FUNCTIONAL STATUS PRIOR TO ADMISSION: Patient was modified independent using a cane for functional mobility. HOME SUPPORT PRIOR TO ADMISSION: The patient lived alone with daughter to provide assistance. Physical Therapy Goals  Initiated 8/20/2021  1. Patient will move from supine to sit and sit to supine , scoot up and down, and roll side to side in bed with modified independence within 7 day(s). 2.  Patient will transfer from bed to chair and chair to bed with modified independence using the least restrictive device within 7 day(s). 3.  Patient will perform sit to stand with modified independence within 7 day(s). 4.  Patient will ambulate with modified independence for 150 feet with the least restrictive device within 7 day(s). 5.  Patient will ascend/descend 3 stairs with handrail(s) with supervision/set-up within 7 day(s). Outcome: Progressing Towards Goal   PHYSICAL THERAPY TREATMENT  Patient: Adelia Manuel (8 y.o. male)  Date: 8/23/2021  Diagnosis: Acute respiratory failure with hypoxia ([facility] [J96.01]  Pneumonia [J18.9]  Cellulitis of left hand [L03.114]  Diarrhea [R19.7] <principal problem not specified>       Precautions: Contact, Fall  Chart, physical therapy assessment, plan of care and goals were reviewed. ASSESSMENT  Adelia Manuel underwent a 6 min walk test. His initial O2  saturation was 93% and his baseline heart rate was 89 BPM. He walked from room to end of cleary at a distance of 150. His post O2  saturation was 84% and his post walk heart rate was 112 BPM. On second trial, pt maintains 90%+ with use of 2 NC. Patient continues with skilled PT services and is progressing towards goals. Pt is now mobilizing well without use of AD. Stairs were easily accomplished and pt only has minor SOB at top of exertion.  No physical assist is needed for general mobility, but supervision is suggested, due to admission Dx, A-Fib and continually fluctuating BP. Current Level of Function Impacting Discharge (mobility/balance): none    Other factors to consider for discharge: Home O2 needs for mobilizing         PLAN :  Patient continues to benefit from skilled intervention to address the above impairments. Continue treatment per established plan of care. to address goals. Recommendation for discharge: (in order for the patient to meet his/her long term goals)  Physical therapy at least 2 days/week in the home     This discharge recommendation:  Has been made in collaboration with the attending provider and/or case management    IF patient discharges home will need the following DME: portable oxygen       SUBJECTIVE:   Patient stated I know what happened, I wasn't eating right.  I've lost 20 lbs recently    OBJECTIVE DATA SUMMARY:   Critical Behavior:  Neurologic State: Alert, Appropriate for age  Orientation Level: Oriented X4  Cognition: Follows commands  Safety/Judgement: Decreased awareness of need for safety, Decreased insight into deficits  Functional Mobility Training:  Bed Mobility:  Rolling: Modified independent  Supine to Sit: Modified independent  Sit to Supine: Modified independent  Scooting: Modified independent        Transfers:  Sit to Stand: Supervision  Stand to Sit: Supervision  Balance:  Sitting: Intact  Standing: Impaired  Standing - Static: Good  Standing - Dynamic : Fair  Ambulation/Gait Training:  Distance (ft): 300 Feet (ft) (150+150)  Assistive Device: Gait belt  Ambulation - Level of Assistance: Supervision  Gait Abnormalities: Decreased step clearance  Base of Support: Widened  Speed/Shruthi: Slow  Step Length: Right shortened;Left shortened  Stairs:  Number of Stairs Trained: 6  Stairs - Level of Assistance: Supervision   Rail Use: Both  Pain Rating:  Pain in: 0  Pain out: 0    Activity Tolerance:   Good    After treatment patient left in no apparent distress:   Sitting in EOB and Call bell within reach    COMMUNICATION/COLLABORATION:   The patients plan of care was discussed with: Registered nurse.      Enoc Brizuela, MARIA VICTORIA   Time Calculation: 44 mins

## 2021-08-24 VITALS
DIASTOLIC BLOOD PRESSURE: 68 MMHG | WEIGHT: 192 LBS | HEIGHT: 69 IN | RESPIRATION RATE: 16 BRPM | SYSTOLIC BLOOD PRESSURE: 144 MMHG | OXYGEN SATURATION: 91 % | BODY MASS INDEX: 28.44 KG/M2 | TEMPERATURE: 97.9 F | HEART RATE: 60 BPM

## 2021-08-24 LAB
BACTERIA SPEC CULT: NORMAL
SERVICE CMNT-IMP: NORMAL

## 2021-08-24 PROCEDURE — 74011636637 HC RX REV CODE- 636/637: Performed by: STUDENT IN AN ORGANIZED HEALTH CARE EDUCATION/TRAINING PROGRAM

## 2021-08-24 PROCEDURE — 74011250637 HC RX REV CODE- 250/637: Performed by: HOSPITALIST

## 2021-08-24 PROCEDURE — 74011250636 HC RX REV CODE- 250/636: Performed by: HOSPITALIST

## 2021-08-24 PROCEDURE — 94760 N-INVAS EAR/PLS OXIMETRY 1: CPT

## 2021-08-24 PROCEDURE — 74011000258 HC RX REV CODE- 258: Performed by: STUDENT IN AN ORGANIZED HEALTH CARE EDUCATION/TRAINING PROGRAM

## 2021-08-24 PROCEDURE — 77010033678 HC OXYGEN DAILY

## 2021-08-24 PROCEDURE — 94640 AIRWAY INHALATION TREATMENT: CPT

## 2021-08-24 PROCEDURE — 74011250636 HC RX REV CODE- 250/636: Performed by: STUDENT IN AN ORGANIZED HEALTH CARE EDUCATION/TRAINING PROGRAM

## 2021-08-24 RX ORDER — PREDNISONE 20 MG/1
40 TABLET ORAL
Qty: 4 TABLET | Refills: 0 | Status: SHIPPED | OUTPATIENT
Start: 2021-08-25 | End: 2021-08-27

## 2021-08-24 RX ORDER — PREDNISONE 20 MG/1
40 TABLET ORAL
Qty: 2 TABLET | Refills: 0 | Status: SHIPPED | OUTPATIENT
Start: 2021-08-25 | End: 2021-08-24 | Stop reason: SDUPTHER

## 2021-08-24 RX ORDER — DOXYCYCLINE 100 MG/1
100 TABLET ORAL 2 TIMES DAILY
Qty: 6 TABLET | Refills: 0 | Status: SHIPPED | OUTPATIENT
Start: 2021-08-24 | End: 2021-08-27

## 2021-08-24 RX ORDER — ALLOPURINOL 100 MG/1
100 TABLET ORAL DAILY
Qty: 30 TABLET | Refills: 1 | Status: SHIPPED | OUTPATIENT
Start: 2021-08-24

## 2021-08-24 RX ORDER — FUROSEMIDE 40 MG/1
40 TABLET ORAL EVERY 12 HOURS
Qty: 60 TABLET | Refills: 1 | Status: ON HOLD | OUTPATIENT
Start: 2021-08-24 | End: 2021-10-09 | Stop reason: SDUPTHER

## 2021-08-24 RX ORDER — AZITHROMYCIN 500 MG/1
500 TABLET, FILM COATED ORAL DAILY
Qty: 3 TABLET | Refills: 0 | Status: SHIPPED | OUTPATIENT
Start: 2021-08-24 | End: 2021-08-24

## 2021-08-24 RX ADMIN — ASPIRIN 81 MG: 81 TABLET, COATED ORAL at 10:28

## 2021-08-24 RX ADMIN — CARVEDILOL 6.25 MG: 6.25 TABLET, FILM COATED ORAL at 10:28

## 2021-08-24 RX ADMIN — POTASSIUM CHLORIDE 20 MEQ: 750 TABLET, FILM COATED, EXTENDED RELEASE ORAL at 10:31

## 2021-08-24 RX ADMIN — CLOPIDOGREL BISULFATE 75 MG: 75 TABLET ORAL at 10:29

## 2021-08-24 RX ADMIN — PREDNISONE 40 MG: 20 TABLET ORAL at 10:29

## 2021-08-24 RX ADMIN — Medication 10 ML: at 06:00

## 2021-08-24 RX ADMIN — DIGOXIN 0.12 MG: 125 TABLET ORAL at 10:30

## 2021-08-24 RX ADMIN — TIOTROPIUM BROMIDE INHALATION SPRAY 2 PUFF: 3.12 SPRAY, METERED RESPIRATORY (INHALATION) at 08:52

## 2021-08-24 RX ADMIN — DOXYCYCLINE 100 MG: 100 INJECTION, POWDER, LYOPHILIZED, FOR SOLUTION INTRAVENOUS at 10:32

## 2021-08-24 RX ADMIN — FUROSEMIDE 40 MG: 10 INJECTION, SOLUTION INTRAMUSCULAR; INTRAVENOUS at 10:32

## 2021-08-24 RX ADMIN — GABAPENTIN 600 MG: 300 CAPSULE ORAL at 10:29

## 2021-08-24 RX ADMIN — FLUTICASONE FUROATE AND VILANTEROL TRIFENATATE 1 PUFF: 200; 25 POWDER RESPIRATORY (INHALATION) at 08:53

## 2021-08-24 RX ADMIN — FOLIC ACID 1 MG: 1 TABLET ORAL at 10:28

## 2021-08-24 RX ADMIN — PRAVASTATIN SODIUM 40 MG: 40 TABLET ORAL at 10:28

## 2021-08-24 RX ADMIN — ENOXAPARIN SODIUM 40 MG: 40 INJECTION SUBCUTANEOUS at 10:33

## 2021-08-24 NOTE — DISCHARGE INSTRUCTIONS
HOSPITALIST DISCHARGE INSTRUCTIONS    NAME: Alesia Kern   :  1950   MRN:  [id number]     Date/Time:  2021 10:47 AM    ADMIT DATE: 2021     DISCHARGE DATE: 2021     DISCHARGE DIAGNOSIS:  Pneumonia  Gout flare up  Chronic systolic heart failure  CAD    Follow-up Information     Follow up With Specialties Details Why Contact [facility]   On 2021 THIS  [address] IF YOU DO NOT HEAR FROM THEM WITHIN 24-48HRS, PLEASE CONTACT THEM DIRECTLY  [phone]    [facility]  Today PLEASE CONTACT [facility] FOR HOME O2 NEEDS  [phone]    Gayle Leonard MD [facility]   [address] [place] [address] [phone]          Please get an appointment with cardiologist for CHF management and echo. Please dont take potassium supplements until you repeat your BMP in 1 week (K on discharge is 5.2)        MEDICATIONS:  As per medication reconciliation  list  · It is important that you take the medication exactly as they are prescribed. · Keep your medication in the bottles provided by the pharmacist and keep a list of the medication names, dosages, and times to be taken in your wallet. · Do not take other medications without consulting your doctor. Pain Management: per above medications    What to do at Home    Recommended diet:  Cardiac Diet    Recommended activity: Activity as tolerated    If you have questions regarding the hospital related prescriptions or hospital related issues [phone]. If you experience any of the following symptoms then please call your primary care physician or return to the emergency room if you cannot get hold of your doctor:  Fever, chills, nausea, vomiting, diarrhea, change in mentation, falling, bleeding, shortness of breath    Follow Up:  Dr. Gayle Leonard MD  you are to call and set up an appointment to see them in 7-10 days.       Information obtained by :  I understand that if any problems occur once I am at home I am to contact my physician. I understand and acknowledge receipt of the instructions indicated above.                                                                                                                                            Physician's or R.N.'s Signature                                                                  Date/Time                                                                                                                                              Patient or Representative Signature                                                          Date/Time

## 2021-08-24 NOTE — PROGRESS NOTES
Problem: Falls - Risk of  Goal: *Absence of Falls  Description: Document Jermaine Ortega Fall Risk and appropriate interventions in the flowsheet. Outcome: Progressing Towards Goal  Note: Fall Risk Interventions:  Mobility Interventions: Assess mobility with egress test         Medication Interventions: Assess postural VS orthostatic hypotension    Elimination Interventions: Call light in reach, Bed/chair exit alarm, Patient to call for help with toileting needs    History of Falls Interventions: Bed/chair exit alarm, Door open when patient unattended         Problem: Patient Education: Go to Patient Education Activity  Goal: Patient/Family Education  Outcome: Progressing Towards Goal     Problem: Risk for Spread of Infection  Goal: Prevent transmission of infectious organism to others  Description: Prevent the transmission of infectious organisms to other patients, staff members, and visitors.   Outcome: Progressing Towards Goal     Problem: Patient Education:  Go to Education Activity  Goal: Patient/Family Education  Outcome: Progressing Towards Goal     Problem: Patient Education: Go to Patient Education Activity  Goal: Patient/Family Education  Outcome: Progressing Towards Goal     Problem: Patient Education: Go to Patient Education Activity  Goal: Patient/Family Education  Outcome: Progressing Towards Goal

## 2021-08-24 NOTE — PROGRESS NOTES
Pharmacist Discharge Medication Reconciliation    Significant PMH:   Past Medical History:   Diagnosis Date    Atrial fibrillation ([facility]     CAD (coronary artery disease)     Cancer ([facility]     Colon CA 2014 - polyp removed/chemo/radiation    Chronic obstructive pulmonary disease (HCC)     GERD (gastroesophageal reflux disease)     Heart failure (HCC)     Hypertension     Sleep apnea     not using machine because of surgery on tongue     Encounter Diagnoses:   Encounter Diagnoses   Name Primary? Acute respiratory failure with hypoxia ([facility] Yes    Community acquired pneumonia of right middle lobe of lung     Cellulitis of left hand     Diarrhea, unspecified type      Allergies: Levaquin [levofloxacin]    Discharge Medications:   Current Discharge Medication List        START taking these medications    Details   allopurinoL (ZYLOPRIM) 100 mg tablet Take 1 Tablet by mouth daily. Qty: 30 Tablet, Refills: 1  Start date: 8/24/2021      doxycycline (ADOXA) 100 mg tablet Take 1 Tablet by mouth two (2) times a day for 3 days. Qty: 6 Tablet, Refills: 0  Start date: 8/24/2021, End date: 8/27/2021      predniSONE (DELTASONE) 20 mg tablet Take 40 mg by mouth daily (with breakfast) for 2 days. Qty: 4 Tablet, Refills: 0  Start date: 8/25/2021, End date: 8/27/2021           CONTINUE these medications which have CHANGED    Details   furosemide (LASIX) 40 mg tablet Take 1 Tablet by mouth every twelve (12) hours. Qty: 60 Tablet, Refills: 1  Start date: 8/24/2021           CONTINUE these medications which have NOT CHANGED    Details   betamethasone dipropionate (DIPROSONE) 0.05 % topical cream Apply  to affected area two (2) times daily as needed for Skin Irritation. tiotropium (SPIRIVA WITH HANDIHALER) 18 mcg inhalation capsule Take 1 Cap by inhalation daily.       fluticasone-salmeterol (ADVAIR DISKUS) 250-50 mcg/dose diskus inhaler Take 1 Puff by inhalation two (2) times a day.      gabapentin (NEURONTIN) 300 mg capsule Take 600 mg by mouth two (2) times a day. aspirin delayed-release 81 mg tablet Take 81 mg by mouth daily. clopidogrel (PLAVIX) 75 mg tab Take 75 mg by mouth daily. pravastatin (PRAVACHOL) 40 mg tablet Take 40 mg by mouth daily. carvedilol (COREG) 6.25 mg tablet Take 6.25 mg by mouth two (2) times a day. digoxin (LANOXIN) 0.125 mg tablet Take 0.125 mg by mouth daily. folic acid (FOLVITE) 1 mg tablet Take 1 Tab by mouth daily. Qty: 30 Tab, Refills: 11           STOP taking these medications       potassium chloride SR (KLOR-CON 10) 10 mEq tablet Comments:   Reason for Stopping:               The patient's chart, MAR and AVS were reviewed by Eleno Dunham [facility].     Discharging Provider: Tadeo Whitman MD    Thank you,     Eleno Dunham, [facility]

## 2021-08-24 NOTE — DISCHARGE SUMMARY
Hospitalist Discharge Summary     Patient ID:  Jaclyn Jad  [id number]  74 y.o.  1950  8/19/2021    PCP on record: Jason Uriostegui MD    Admit date: 8/19/2021  Discharge date and time: 8/24/2021    DISCHARGE DIAGNOSIS:  Pneumonia  Gout flare of left thumb and elbow  Chronic systolic heart failure  CAD    CONSULTATIONS:  IP CONSULT TO ORTHOPEDIC SURGERY    Excerpted HPI from H&P of Kendra Khan MD:      ______________________________________________________________________  DISCHARGE SUMMARY/HOSPITAL COURSE:  for full details see H&P, daily progress notes, labs, consult notes. Acute hypoxic respiratory failure, POA due to  Right sided community acquired pneumonia, POA     COVID pcr negative Has been vaccinated with Moderna vaccine  Developed hives to levaquin in ER, treated with Benadryl  CTA chest negative PE, +RML consolidation and patchy opacification in RUL. Home oxygen is set up  Will send on oral abx for 3 more days.     Left thumb and elbow gout flare up     --xray left hand with soft tissue swelling; denies any injury  MRI negative for any infection/Osteo  Likely is Gout  C/w prednisone for 2 more days, allopurinol started.     Diarrhea, POA  Rule out C. diff  --recently on z-pack on 8/11 by pcp  C diff negative     Copd, without exac  Chronic systolic chf EF 40-03% 4/23  HTN  CAD  Will need repeat ECHO, wants to do this as outpatient with f/u with cardiology. Resume home meds  Hold K supplements until you repeat BMP with your PMD     PAD hx right SFA stent 12/2014     Persistent afib  CKD stage 3  Neuropathy  Colon cancer 2014  Hx of tongue cancer s/p resection, XRT and chemo 1 year ago per patient    Resume home meds                _______________________________________________________________________  Patient seen and examined by me on discharge day. Pertinent Findings:  Gen:    Not in distress  Chest: Clear lungs  CVS:   Regular rhythm.   No edema  Abd:  Soft, not distended, not tender  Neuro:  Alert,   _______________________________________________________________________  DISCHARGE MEDICATIONS:   Current Discharge Medication List      START taking these medications    Details   predniSONE (DELTASONE) 20 mg tablet Take 40 mg by mouth daily (with breakfast) for 1 dose. Qty: 2 Tablet, Refills: 0  Start date: 8/25/2021, End date: 8/26/2021      azithromycin (ZITHROMAX) 500 mg tab Take 1 Tablet by mouth daily for 3 days. Qty: 3 Tablet, Refills: 0  Start date: 8/24/2021, End date: 8/27/2021      allopurinoL (ZYLOPRIM) 100 mg tablet Take 1 Tablet by mouth daily. Qty: 30 Tablet, Refills: 1  Start date: 8/24/2021         CONTINUE these medications which have CHANGED    Details   furosemide (LASIX) 40 mg tablet Take 1 Tablet by mouth every twelve (12) hours. Qty: 60 Tablet, Refills: 1  Start date: 8/24/2021         CONTINUE these medications which have NOT CHANGED    Details   betamethasone dipropionate (DIPROSONE) 0.05 % topical cream Apply  to affected area two (2) times daily as needed for Skin Irritation. tiotropium (SPIRIVA WITH HANDIHALER) 18 mcg inhalation capsule Take 1 Cap by inhalation daily. fluticasone-salmeterol (ADVAIR DISKUS) 250-50 mcg/dose diskus inhaler Take 1 Puff by inhalation two (2) times a day.      gabapentin (NEURONTIN) 300 mg capsule Take 600 mg by mouth two (2) times a day. aspirin delayed-release 81 mg tablet Take 81 mg by mouth daily. clopidogrel (PLAVIX) 75 mg tab Take 75 mg by mouth daily. pravastatin (PRAVACHOL) 40 mg tablet Take 40 mg by mouth daily. carvedilol (COREG) 6.25 mg tablet Take 6.25 mg by mouth two (2) times a day. digoxin (LANOXIN) 0.125 mg tablet Take 0.125 mg by mouth daily. folic acid (FOLVITE) 1 mg tablet Take 1 Tab by mouth daily.   Qty: 30 Tab, Refills: 11         STOP taking these medications       potassium chloride SR (KLOR-CON 10) 10 mEq tablet Comments:   Reason for Stopping: Patient Follow Up Instructions: Activity: Activity as tolerated  Diet: Cardiac Diet  Wound Care: None needed    Follow-up with Your PMD    Follow-up Information     Follow up With Specialties Details Why [address]   On 8/23/2021 THIS  [address]   IF YOU DO NOT HEAR FROM THEM WITHIN 24-48HRS, PLEASE CONTACT THEM DIRECTLY  [phone]    [facility]  Today PLEASE CONTACT [facility] FOR HOME O2 NEEDS  [phone]    Yojana Pastor MD [facility]   [address]  [address] [address] [phone]          ________________________________________________________________    Risk of deterioration: Moderate    Condition at Discharge:  Stable  __________________________________________________________________    Disposition  Home with family and home health services    ____________________________________________________________________    Code Status: Full Code  ___________________________________________________________________      Total time in minutes spent coordinating this discharge (includes going over instructions, follow-up, prescriptions, and preparing report for sign off to her PCP) :  >30 minutes    Signed:  Reji Quezada MD

## 2021-08-24 NOTE — PROGRESS NOTES
Patient discharged via wheelchair to daughter. His oxygen was delivered here and taken to daughter's car.

## 2021-08-24 NOTE — PROGRESS NOTES
End of Shift Note    Bedside shift change report given to Shaina Rooney RN (oncoming nurse) by Lillian Tejeda RN (offgoing nurse). Report included the following information SBAR, Kardex, Intake/Output and MAR    Shift worked:  Night   Shift summary and any significant changes:     poc reviewed. IV abx , A&O X4. Fall precautions in place. tele monitored. No concerns at this moment. Concerns for physician to address:  none   Zone phone for oncoming shift:   [phone]     Patient Information  Jimbo Borrero  69 y.o.  8/19/2021  9:35 AM by Edgar Colon MD. Jimbo Borrero was admitted from Home    Problem List  Patient Active Problem List    Diagnosis Date Noted    Diarrhea 08/19/2021    Pneumonia 08/19/2021    Cellulitis of left hand 08/19/2021    Acute respiratory failure with hypoxia ([address] 08/19/2021    PAD (peripheral artery disease) ([address] 10/29/2014    Acute on chronic systolic heart failure ([address] 01/06/2014    Atrial flutter ([address] 10/30/2010    COPD (chronic obstructive pulmonary disease) ([address] 10/30/2010    Dilated cardiomyopathy ([address] 10/30/2010    ETOH abuse 10/30/2010     Past Medical History:   Diagnosis Date    Atrial fibrillation ([address]     CAD (coronary artery disease)     Cancer ([address]     Colon CA 2014 - polyp removed/chemo/radiation    Chronic obstructive pulmonary disease (HCC)     GERD (gastroesophageal reflux disease)     Heart failure (HCC)     Hypertension     Sleep apnea     not using machine because of surgery on tongue       Core Measures:  CVA: no No  CHF:Yes Yes  PNA:yes Yes    Activity:  Activity Level:  Up with Assistance  Number times ambulated in hallways past shift: 0  Number of times OOB to chair past shift: 4    Cardiac:   Cardiac Monitoring: Yes      Cardiac Rhythm: Atrial Fib    Access:   Current line(s): PIV     Genitourinary:   Urinary status: voiding    Respiratory:   O2 Device: Nasal cannula  Chronic home O2 use?: NO  Incentive spirometer at bedside: NO       GI:  Last Bowel Movement Date: 08/19/21  Current diet:  ADULT DIET Regular; Low Fat/Low Chol/High Fiber/2 gm Na  Passing flatus: YES  Tolerating current diet: YES       Pain Management:   Patient states pain is manageable on current regimen: YES    Skin:  Ignacio Score: 20  Interventions: PT/OT consult    Patient Safety:  Fall Score:  Total Score: 4  Interventions: assistive device (walker, cane, etc), gripper socks and pt to call before getting OOB  High Fall Risk: Yes  @Rollbelt  @dexterity to release roll belt  Yes/No ( must document dexterity  here by stating Yes or No here, otherwise this is a restraint and must follow restraint documentation policy.)    DVT prophylaxis:  DVT prophylaxis Med- No  DVT prophylaxis SCD or DIONTE- No     Wounds: (If Applicable)  Wounds- Yes  Location scattered abrasion     Active Consults:  IP CONSULT TO ORTHOPEDIC SURGERY    Length of Stay:  Expected LOS: 4d 2h  Actual LOS: 5  Discharge Plan: Yes       Brandan Arias RN

## 2021-08-24 NOTE — PROGRESS NOTES
Transition of Care Plan:    RUR: 17%  Disposition: Home with family support-[facility] and Home O2 (insurance pending-[employer])  Follow up appointments: Follow up appointment with PCP and/or Specialist   DME needed: TBD by therapist-Pt has CPAP at home and will require Home O2  Transportation at Discharge: Family to assist with transport   [place] or means to access home:      Family will have keys to enter home   IM Medicare Letter: 2nd  Medicare Letter to be given   Is patient a BCPI-A Bundle: If yes, was Bundle Letter given?:     Caregiver Contact: Angella Conteh (daughter) [phone]  Discharge Caregiver contacted prior to discharge? Family to be contacted    UPDATE: 9:37AM    CM made aware that pt will be d/c on today and will transition home with family support, [facility] and Home O2. CM spoke with pt's daughter: Ricky Joyce, via telephone to inform her of the following services and pt's d/c. Ricky Jocye reported that she transport pt home on today and [facility] [address] [facility] by noon. CM will inform pt's nurse of the following. CM completed the need of the pt at this time. Bertis Blizzard, MSW, [place]        UPDATE: 9:17AM    CM received a call from [employer] regarding voicemail. CM informed that pt has been approved for Home O2, and [employer] rep will deliver Home O2 portable tank to [address] on today by noon today. CM will inform physician and pt of the following. CM will continue to follow. Bertis Blizzard, MSW, [place]        INITIAL NOTE: CM followed up with Home O2 referral that was sent on 8/23/21, via [employer]. It was reported that [employer] (O2 vendor), is willing to accept pt, however, Home O2 portable tank has not been delivered by bedside for pt. CM attempt contact with [facility]ahn[facility] ([phone]), via telephone to verify Home O2 tank delivery. However, attempt was unsuccessful and CM left a voicemail requesting a return call.     Pt has been accepted to [facility] agency: [facility].    CM has escalated Home O2 issue to CM . CM will continue to follow.     NATALYA Delong, [address] [place] [address]

## 2021-10-04 ENCOUNTER — APPOINTMENT (OUTPATIENT)
Dept: GENERAL RADIOLOGY | Age: 71
DRG: 177 | End: 2021-10-04
Attending: EMERGENCY MEDICINE
Payer: MEDICARE

## 2021-10-04 ENCOUNTER — HOSPITAL ENCOUNTER (INPATIENT)
Age: 71
LOS: 5 days | Discharge: HOME | DRG: 177 | End: 2021-10-09
Attending: EMERGENCY MEDICINE | Admitting: INTERNAL MEDICINE
Payer: MEDICARE

## 2021-10-04 ENCOUNTER — APPOINTMENT (OUTPATIENT)
Dept: CT IMAGING | Age: 71
DRG: 177 | End: 2021-10-04
Attending: EMERGENCY MEDICINE
Payer: MEDICARE

## 2021-10-04 DIAGNOSIS — E78.2 MIXED HYPERLIPIDEMIA: ICD-10-CM

## 2021-10-04 DIAGNOSIS — I25.10 CORONARY ARTERY DISEASE INVOLVING NATIVE CORONARY ARTERY OF NATIVE HEART WITHOUT ANGINA PECTORIS: ICD-10-CM

## 2021-10-04 DIAGNOSIS — I50.23 ACUTE ON CHRONIC SYSTOLIC CONGESTIVE HEART FAILURE (HCC): Primary | ICD-10-CM

## 2021-10-04 DIAGNOSIS — I69.322 CVA, OLD, DYSARTHRIA: ICD-10-CM

## 2021-10-04 DIAGNOSIS — C02.9 TONGUE CANCER (HCC): ICD-10-CM

## 2021-10-04 PROBLEM — I50.9 CHF (CONGESTIVE HEART FAILURE) (HCC): Status: ACTIVE | Noted: 2021-10-04

## 2021-10-04 LAB
ALBUMIN SERPL-MCNC: 3.1 G/DL (ref 3.5–5)
ALBUMIN/GLOB SERPL: 0.9 {RATIO} (ref 1.1–2.2)
ALP SERPL-CCNC: 80 U/L (ref 45–117)
ALT SERPL-CCNC: 8 U/L (ref 12–78)
ANION GAP SERPL CALC-SCNC: 0 MMOL/L (ref 5–15)
AST SERPL-CCNC: 9 U/L (ref 15–37)
BASOPHILS # BLD: 0 K/UL (ref 0–0.1)
BASOPHILS NFR BLD: 1 % (ref 0–1)
BILIRUB SERPL-MCNC: 0.6 MG/DL (ref 0.2–1)
BNP SERPL-MCNC: ABNORMAL PG/ML
BUN SERPL-MCNC: 15 MG/DL (ref 6–20)
BUN/CREAT SERPL: 11 (ref 12–20)
CALCIUM SERPL-MCNC: 8.3 MG/DL (ref 8.5–10.1)
CHLORIDE SERPL-SCNC: 104 MMOL/L (ref 97–108)
CO2 SERPL-SCNC: 32 MMOL/L (ref 21–32)
CREAT SERPL-MCNC: 1.4 MG/DL (ref 0.7–1.3)
DIFFERENTIAL METHOD BLD: ABNORMAL
EOSINOPHIL # BLD: 0.2 K/UL (ref 0–0.4)
EOSINOPHIL NFR BLD: 6 % (ref 0–7)
ERYTHROCYTE [DISTWIDTH] IN BLOOD BY AUTOMATED COUNT: 20.7 % (ref 11.5–14.5)
GLOBULIN SER CALC-MCNC: 3.6 G/DL (ref 2–4)
GLUCOSE SERPL-MCNC: 88 MG/DL (ref 65–100)
HCT VFR BLD AUTO: 25 % (ref 36.6–50.3)
HGB BLD-MCNC: 7.3 G/DL (ref 12.1–17)
IMM GRANULOCYTES # BLD AUTO: 0 K/UL (ref 0–0.04)
IMM GRANULOCYTES NFR BLD AUTO: 0 % (ref 0–0.5)
LYMPHOCYTES # BLD: 0.8 K/UL (ref 0.8–3.5)
LYMPHOCYTES NFR BLD: 23 % (ref 12–49)
MCH RBC QN AUTO: 20.7 PG (ref 26–34)
MCHC RBC AUTO-ENTMCNC: 29.2 G/DL (ref 30–36.5)
MCV RBC AUTO: 71 FL (ref 80–99)
MONOCYTES # BLD: 0.3 K/UL (ref 0–1)
MONOCYTES NFR BLD: 10 % (ref 5–13)
NEUTS SEG # BLD: 2 K/UL (ref 1.8–8)
NEUTS SEG NFR BLD: 60 % (ref 32–75)
NRBC # BLD: 0 K/UL (ref 0–0.01)
NRBC BLD-RTO: 0 PER 100 WBC
PLATELET # BLD AUTO: 158 K/UL (ref 150–400)
PMV BLD AUTO: 9.3 FL (ref 8.9–12.9)
POTASSIUM SERPL-SCNC: 4 MMOL/L (ref 3.5–5.1)
PROT SERPL-MCNC: 6.7 G/DL (ref 6.4–8.2)
RBC # BLD AUTO: 3.52 M/UL (ref 4.1–5.7)
RBC MORPH BLD: ABNORMAL
SODIUM SERPL-SCNC: 136 MMOL/L (ref 136–145)
TROPONIN I SERPL-MCNC: <0.05 NG/ML
WBC # BLD AUTO: 3.3 K/UL (ref 4.1–11.1)

## 2021-10-04 PROCEDURE — 84484 ASSAY OF TROPONIN QUANT: CPT

## 2021-10-04 PROCEDURE — 83880 ASSAY OF NATRIURETIC PEPTIDE: CPT

## 2021-10-04 PROCEDURE — 85025 COMPLETE CBC W/AUTO DIFF WBC: CPT

## 2021-10-04 PROCEDURE — 99284 EMERGENCY DEPT VISIT MOD MDM: CPT

## 2021-10-04 PROCEDURE — 65660000000 HC RM CCU STEPDOWN

## 2021-10-04 PROCEDURE — 80053 COMPREHEN METABOLIC PANEL: CPT

## 2021-10-04 PROCEDURE — 74011000250 HC RX REV CODE- 250: Performed by: EMERGENCY MEDICINE

## 2021-10-04 PROCEDURE — 74011000636 HC RX REV CODE- 636: Performed by: EMERGENCY MEDICINE

## 2021-10-04 PROCEDURE — 36415 COLL VENOUS BLD VENIPUNCTURE: CPT

## 2021-10-04 PROCEDURE — 71045 X-RAY EXAM CHEST 1 VIEW: CPT

## 2021-10-04 PROCEDURE — 71275 CT ANGIOGRAPHY CHEST: CPT

## 2021-10-04 PROCEDURE — 93005 ELECTROCARDIOGRAM TRACING: CPT

## 2021-10-04 PROCEDURE — 70487 CT MAXILLOFACIAL W/DYE: CPT

## 2021-10-04 RX ORDER — ONDANSETRON 2 MG/ML
4 INJECTION INTRAMUSCULAR; INTRAVENOUS
Status: DISCONTINUED | OUTPATIENT
Start: 2021-10-04 | End: 2021-10-09 | Stop reason: HOSPADM

## 2021-10-04 RX ORDER — ACETAMINOPHEN 325 MG/1
650 TABLET ORAL
Status: DISCONTINUED | OUTPATIENT
Start: 2021-10-04 | End: 2021-10-09 | Stop reason: HOSPADM

## 2021-10-04 RX ORDER — BUMETANIDE 0.25 MG/ML
1 INJECTION INTRAMUSCULAR; INTRAVENOUS
Status: COMPLETED | OUTPATIENT
Start: 2021-10-04 | End: 2021-10-04

## 2021-10-04 RX ADMIN — BUMETANIDE 1 MG: 0.25 INJECTION INTRAMUSCULAR; INTRAVENOUS at 23:48

## 2021-10-04 RX ADMIN — IOPAMIDOL 100 ML: 755 INJECTION, SOLUTION INTRAVENOUS at 21:59

## 2021-10-05 ENCOUNTER — TELEPHONE (OUTPATIENT)
Dept: ONCOLOGY | Age: 71
End: 2021-10-05

## 2021-10-05 ENCOUNTER — APPOINTMENT (OUTPATIENT)
Dept: MRI IMAGING | Age: 71
DRG: 177 | End: 2021-10-05
Attending: INTERNAL MEDICINE
Payer: MEDICARE

## 2021-10-05 ENCOUNTER — APPOINTMENT (OUTPATIENT)
Dept: GENERAL RADIOLOGY | Age: 71
DRG: 177 | End: 2021-10-05
Attending: INTERNAL MEDICINE
Payer: MEDICARE

## 2021-10-05 ENCOUNTER — APPOINTMENT (OUTPATIENT)
Dept: ULTRASOUND IMAGING | Age: 71
DRG: 177 | End: 2021-10-05
Attending: INTERNAL MEDICINE
Payer: MEDICARE

## 2021-10-05 ENCOUNTER — APPOINTMENT (OUTPATIENT)
Dept: CT IMAGING | Age: 71
DRG: 177 | End: 2021-10-05
Attending: INTERNAL MEDICINE
Payer: MEDICARE

## 2021-10-05 ENCOUNTER — APPOINTMENT (OUTPATIENT)
Dept: GENERAL RADIOLOGY | Age: 71
DRG: 177 | End: 2021-10-05
Attending: NURSE PRACTITIONER
Payer: MEDICARE

## 2021-10-05 ENCOUNTER — APPOINTMENT (OUTPATIENT)
Dept: NON INVASIVE DIAGNOSTICS | Age: 71
DRG: 177 | End: 2021-10-05
Attending: INTERNAL MEDICINE
Payer: MEDICARE

## 2021-10-05 PROBLEM — R47.01 APHASIA: Status: ACTIVE | Noted: 2021-10-05

## 2021-10-05 PROBLEM — C02.9 TONGUE CANCER (HCC): Chronic | Status: ACTIVE | Noted: 2021-10-04

## 2021-10-05 PROBLEM — J69.0 ASPIRATION PNEUMONIA (HCC): Status: ACTIVE | Noted: 2021-08-19

## 2021-10-05 LAB
ANION GAP SERPL CALC-SCNC: 0 MMOL/L (ref 5–15)
APPEARANCE FLD: ABNORMAL
BNP SERPL-MCNC: ABNORMAL PG/ML
BUN SERPL-MCNC: 15 MG/DL (ref 6–20)
BUN/CREAT SERPL: 11 (ref 12–20)
CALCIUM SERPL-MCNC: 8.4 MG/DL (ref 8.5–10.1)
CHLORIDE SERPL-SCNC: 105 MMOL/L (ref 97–108)
CO2 SERPL-SCNC: 33 MMOL/L (ref 21–32)
COLOR FLD: YELLOW
CREAT SERPL-MCNC: 1.4 MG/DL (ref 0.7–1.3)
ECHO AO ROOT DIAM: 3.09 CM
ECHO AV AREA PEAK VELOCITY: 1.51 CM2
ECHO AV AREA PEAK VELOCITY: 1.52 CM2
ECHO AV AREA PEAK VELOCITY: 1.53 CM2
ECHO AV AREA PEAK VELOCITY: 1.54 CM2
ECHO AV AREA VTI: 1.72 CM2
ECHO AV AREA/BSA VTI: 0.9 CM2/M2
ECHO AV CUSP MM: 1.91 CM
ECHO AV MEAN GRADIENT: 5.04 MMHG
ECHO AV PEAK GRADIENT: 10.58 MMHG
ECHO AV PEAK GRADIENT: 10.77 MMHG
ECHO AV PEAK VELOCITY: 162.63 CM/S
ECHO AV PEAK VELOCITY: 164.06 CM/S
ECHO AV VTI: 33.52 CM
ECHO LA AREA 4C: 30.9 CM2
ECHO LA MAJOR AXIS: 4.05 CM
ECHO LA MINOR AXIS: 2.05 CM
ECHO LA TO AORTIC ROOT RATIO: 1.46
ECHO LA TO AORTIC ROOT RATIO: 1.46
ECHO LA VOL 2C: 137.86 ML (ref 18–58)
ECHO LA VOL 4C: 108.44 ML (ref 18–58)
ECHO LA VOL BP: 129.2 ML (ref 18–58)
ECHO LA VOL/BSA BIPLANE: 65.25 ML/M2 (ref 16–28)
ECHO LA VOLUME INDEX A2C: 69.63 ML/M2 (ref 16–28)
ECHO LA VOLUME INDEX A4C: 54.77 ML/M2 (ref 16–28)
ECHO LV INTERNAL DIMENSION DIASTOLIC: 4.35 CM (ref 4.2–5.9)
ECHO LV INTERNAL DIMENSION SYSTOLIC: 3.17 CM
ECHO LV IVSD: 1.48 CM (ref 0.6–1)
ECHO LV IVSS: 1.81 CM
ECHO LV MASS 2D: 253.1 G (ref 88–224)
ECHO LV MASS INDEX 2D: 127.8 G/M2 (ref 49–115)
ECHO LV POSTERIOR WALL DIASTOLIC: 1.45 CM (ref 0.6–1)
ECHO LV POSTERIOR WALL SYSTOLIC: 1.95 CM
ECHO LVOT DIAM: 2.11 CM
ECHO LVOT PEAK GRADIENT: 1.98 MMHG
ECHO LVOT PEAK GRADIENT: 2.04 MMHG
ECHO LVOT PEAK VELOCITY: 70.43 CM/S
ECHO LVOT PEAK VELOCITY: 71.49 CM/S
ECHO LVOT SV: 57.6 ML
ECHO LVOT VTI: 16.41 CM
ECHO MV AREA PHT: 3.44 CM2
ECHO MV E DECELERATION TIME (DT): 218.32 MS
ECHO MV E VELOCITY: 112.28 CM/S
ECHO MV PRESSURE HALF TIME (PHT): 64.02 MS
ECHO PV MAX VELOCITY: 95.05 CM/S
ECHO PV PEAK INSTANTANEOUS GRADIENT SYSTOLIC: 3.62 MMHG
ECHO RV INTERNAL DIMENSION: 5.22 CM
ECHO TV REGURGITANT MAX VELOCITY: 384.02 CM/S
ECHO TV REGURGITANT PEAK GRADIENT: 59.1 MMHG
ERYTHROCYTE [DISTWIDTH] IN BLOOD BY AUTOMATED COUNT: 21.2 % (ref 11.5–14.5)
GLUCOSE SERPL-MCNC: 85 MG/DL (ref 65–100)
HCT VFR BLD AUTO: 28.5 % (ref 36.6–50.3)
HGB BLD-MCNC: 8.2 G/DL (ref 12.1–17)
LDH FLD L TO P-CCNC: 51 U/L
LVOT MG: 0.87 MMHG
LYMPHOCYTES NFR FLD: 27 %
MAGNESIUM SERPL-MCNC: 2.6 MG/DL (ref 1.6–2.4)
MCH RBC QN AUTO: 20.9 PG (ref 26–34)
MCHC RBC AUTO-ENTMCNC: 28.8 G/DL (ref 30–36.5)
MCV RBC AUTO: 72.5 FL (ref 80–99)
MONOS+MACROS NFR FLD: 72 %
NEUTROPHILS NFR FLD: 1 %
NRBC # BLD: 0 K/UL (ref 0–0.01)
NRBC BLD-RTO: 0 PER 100 WBC
NUC CELL # FLD: 137 /CU MM
PLATELET # BLD AUTO: 166 K/UL (ref 150–400)
PMV BLD AUTO: 9.9 FL (ref 8.9–12.9)
POTASSIUM SERPL-SCNC: 4.1 MMOL/L (ref 3.5–5.1)
PROCALCITONIN SERPL-MCNC: <0.05 NG/ML
PROT FLD-MCNC: 2.1 G/DL
RBC # BLD AUTO: 3.93 M/UL (ref 4.1–5.7)
RBC # FLD: 50 /CU MM
SODIUM SERPL-SCNC: 138 MMOL/L (ref 136–145)
SPECIMEN SOURCE FLD: ABNORMAL
SPECIMEN SOURCE FLD: NORMAL
SPECIMEN SOURCE FLD: NORMAL
WBC # BLD AUTO: 3.1 K/UL (ref 4.1–11.1)

## 2021-10-05 PROCEDURE — 71045 X-RAY EXAM CHEST 1 VIEW: CPT

## 2021-10-05 PROCEDURE — 74011250636 HC RX REV CODE- 250/636: Performed by: INTERNAL MEDICINE

## 2021-10-05 PROCEDURE — 93306 TTE W/DOPPLER COMPLETE: CPT

## 2021-10-05 PROCEDURE — C1729 CATH, DRAINAGE: HCPCS

## 2021-10-05 PROCEDURE — 87075 CULTR BACTERIA EXCEPT BLOOD: CPT

## 2021-10-05 PROCEDURE — 97535 SELF CARE MNGMENT TRAINING: CPT | Performed by: OCCUPATIONAL THERAPIST

## 2021-10-05 PROCEDURE — 97530 THERAPEUTIC ACTIVITIES: CPT

## 2021-10-05 PROCEDURE — 36415 COLL VENOUS BLD VENIPUNCTURE: CPT

## 2021-10-05 PROCEDURE — 94640 AIRWAY INHALATION TREATMENT: CPT

## 2021-10-05 PROCEDURE — 87205 SMEAR GRAM STAIN: CPT

## 2021-10-05 PROCEDURE — 92610 EVALUATE SWALLOWING FUNCTION: CPT | Performed by: SPEECH-LANGUAGE PATHOLOGIST

## 2021-10-05 PROCEDURE — 97162 PT EVAL MOD COMPLEX 30 MIN: CPT

## 2021-10-05 PROCEDURE — 97161 PT EVAL LOW COMPLEX 20 MIN: CPT

## 2021-10-05 PROCEDURE — 93306 TTE W/DOPPLER COMPLETE: CPT | Performed by: INTERNAL MEDICINE

## 2021-10-05 PROCEDURE — 83880 ASSAY OF NATRIURETIC PEPTIDE: CPT

## 2021-10-05 PROCEDURE — 88305 TISSUE EXAM BY PATHOLOGIST: CPT

## 2021-10-05 PROCEDURE — 0W993ZZ DRAINAGE OF RIGHT PLEURAL CAVITY, PERCUTANEOUS APPROACH: ICD-10-PCS | Performed by: INTERNAL MEDICINE

## 2021-10-05 PROCEDURE — 84157 ASSAY OF PROTEIN OTHER: CPT

## 2021-10-05 PROCEDURE — 80048 BASIC METABOLIC PNL TOTAL CA: CPT

## 2021-10-05 PROCEDURE — 89050 BODY FLUID CELL COUNT: CPT

## 2021-10-05 PROCEDURE — 88112 CYTOPATH CELL ENHANCE TECH: CPT

## 2021-10-05 PROCEDURE — 74011000258 HC RX REV CODE- 258: Performed by: INTERNAL MEDICINE

## 2021-10-05 PROCEDURE — 83615 LACTATE (LD) (LDH) ENZYME: CPT

## 2021-10-05 PROCEDURE — 65660000001 HC RM ICU INTERMED STEPDOWN

## 2021-10-05 PROCEDURE — 83735 ASSAY OF MAGNESIUM: CPT

## 2021-10-05 PROCEDURE — 77010033678 HC OXYGEN DAILY

## 2021-10-05 PROCEDURE — 74011250637 HC RX REV CODE- 250/637: Performed by: EMERGENCY MEDICINE

## 2021-10-05 PROCEDURE — 74011000250 HC RX REV CODE- 250: Performed by: INTERNAL MEDICINE

## 2021-10-05 PROCEDURE — 74011250637 HC RX REV CODE- 250/637: Performed by: INTERNAL MEDICINE

## 2021-10-05 PROCEDURE — 84145 PROCALCITONIN (PCT): CPT

## 2021-10-05 PROCEDURE — 74011636637 HC RX REV CODE- 636/637: Performed by: INTERNAL MEDICINE

## 2021-10-05 PROCEDURE — 94760 N-INVAS EAR/PLS OXIMETRY 1: CPT

## 2021-10-05 PROCEDURE — 97165 OT EVAL LOW COMPLEX 30 MIN: CPT | Performed by: OCCUPATIONAL THERAPIST

## 2021-10-05 PROCEDURE — 70551 MRI BRAIN STEM W/O DYE: CPT

## 2021-10-05 PROCEDURE — 70450 CT HEAD/BRAIN W/O DYE: CPT

## 2021-10-05 PROCEDURE — 85027 COMPLETE CBC AUTOMATED: CPT

## 2021-10-05 RX ORDER — CARVEDILOL 6.25 MG/1
6.25 TABLET ORAL 2 TIMES DAILY
Status: DISCONTINUED | OUTPATIENT
Start: 2021-10-05 | End: 2021-10-06

## 2021-10-05 RX ORDER — BUDESONIDE 0.5 MG/2ML
500 INHALANT ORAL
Status: DISCONTINUED | OUTPATIENT
Start: 2021-10-05 | End: 2021-10-09 | Stop reason: HOSPADM

## 2021-10-05 RX ORDER — IPRATROPIUM BROMIDE 0.5 MG/2.5ML
0.5 SOLUTION RESPIRATORY (INHALATION)
Status: DISCONTINUED | OUTPATIENT
Start: 2021-10-05 | End: 2021-10-09 | Stop reason: HOSPADM

## 2021-10-05 RX ORDER — LABETALOL HYDROCHLORIDE 5 MG/ML
5 INJECTION, SOLUTION INTRAVENOUS
Status: DISCONTINUED | OUTPATIENT
Start: 2021-10-05 | End: 2021-10-09 | Stop reason: HOSPADM

## 2021-10-05 RX ORDER — ALLOPURINOL 100 MG/1
100 TABLET ORAL DAILY
Status: DISCONTINUED | OUTPATIENT
Start: 2021-10-05 | End: 2021-10-09 | Stop reason: HOSPADM

## 2021-10-05 RX ORDER — ASPIRIN 81 MG/1
81 TABLET ORAL DAILY
Status: DISCONTINUED | OUTPATIENT
Start: 2021-10-05 | End: 2021-10-09 | Stop reason: HOSPADM

## 2021-10-05 RX ORDER — PRAVASTATIN SODIUM 40 MG/1
40 TABLET ORAL DAILY
Status: DISCONTINUED | OUTPATIENT
Start: 2021-10-05 | End: 2021-10-09 | Stop reason: HOSPADM

## 2021-10-05 RX ORDER — ENOXAPARIN SODIUM 100 MG/ML
40 INJECTION SUBCUTANEOUS
Status: DISCONTINUED | OUTPATIENT
Start: 2021-10-06 | End: 2021-10-09 | Stop reason: HOSPADM

## 2021-10-05 RX ORDER — PREDNISONE 20 MG/1
40 TABLET ORAL
Status: DISCONTINUED | OUTPATIENT
Start: 2021-10-05 | End: 2021-10-09 | Stop reason: HOSPADM

## 2021-10-05 RX ORDER — ARFORMOTEROL TARTRATE 15 UG/2ML
15 SOLUTION RESPIRATORY (INHALATION)
Status: DISCONTINUED | OUTPATIENT
Start: 2021-10-05 | End: 2021-10-09 | Stop reason: HOSPADM

## 2021-10-05 RX ORDER — DIGOXIN 125 MCG
0.12 TABLET ORAL DAILY
Status: DISCONTINUED | OUTPATIENT
Start: 2021-10-05 | End: 2021-10-09 | Stop reason: HOSPADM

## 2021-10-05 RX ORDER — FOLIC ACID 1 MG/1
1 TABLET ORAL DAILY
Status: DISCONTINUED | OUTPATIENT
Start: 2021-10-05 | End: 2021-10-09 | Stop reason: HOSPADM

## 2021-10-05 RX ORDER — CLOPIDOGREL BISULFATE 75 MG/1
75 TABLET ORAL DAILY
Status: DISCONTINUED | OUTPATIENT
Start: 2021-10-05 | End: 2021-10-09 | Stop reason: HOSPADM

## 2021-10-05 RX ORDER — GABAPENTIN 300 MG/1
600 CAPSULE ORAL 2 TIMES DAILY
Status: DISCONTINUED | OUTPATIENT
Start: 2021-10-05 | End: 2021-10-09 | Stop reason: HOSPADM

## 2021-10-05 RX ORDER — BUMETANIDE 0.25 MG/ML
1 INJECTION INTRAMUSCULAR; INTRAVENOUS EVERY 12 HOURS
Status: DISCONTINUED | OUTPATIENT
Start: 2021-10-05 | End: 2021-10-09 | Stop reason: HOSPADM

## 2021-10-05 RX ADMIN — DIGOXIN 0.12 MG: 125 TABLET ORAL at 08:49

## 2021-10-05 RX ADMIN — BUMETANIDE 1 MG: 0.25 INJECTION, SOLUTION INTRAMUSCULAR; INTRAVENOUS at 08:49

## 2021-10-05 RX ADMIN — ARFORMOTEROL TARTRATE 15 MCG: 15 SOLUTION RESPIRATORY (INHALATION) at 08:55

## 2021-10-05 RX ADMIN — PRAVASTATIN SODIUM 40 MG: 40 TABLET ORAL at 08:49

## 2021-10-05 RX ADMIN — GABAPENTIN 600 MG: 300 CAPSULE ORAL at 08:50

## 2021-10-05 RX ADMIN — PIPERACILLIN AND TAZOBACTAM 3.38 G: 3; .375 INJECTION, POWDER, LYOPHILIZED, FOR SOLUTION INTRAVENOUS at 11:15

## 2021-10-05 RX ADMIN — CARVEDILOL 6.25 MG: 6.25 TABLET, FILM COATED ORAL at 08:50

## 2021-10-05 RX ADMIN — ACETAMINOPHEN 650 MG: 325 TABLET ORAL at 02:09

## 2021-10-05 RX ADMIN — BUDESONIDE 500 MCG: 0.5 INHALANT RESPIRATORY (INHALATION) at 19:53

## 2021-10-05 RX ADMIN — ARFORMOTEROL TARTRATE 15 MCG: 15 SOLUTION RESPIRATORY (INHALATION) at 19:53

## 2021-10-05 RX ADMIN — BUDESONIDE 500 MCG: 0.5 INHALANT RESPIRATORY (INHALATION) at 08:55

## 2021-10-05 RX ADMIN — PREDNISONE 40 MG: 20 TABLET ORAL at 08:49

## 2021-10-05 RX ADMIN — FOLIC ACID 1 MG: 1 TABLET ORAL at 08:50

## 2021-10-05 RX ADMIN — CLOPIDOGREL BISULFATE 75 MG: 75 TABLET ORAL at 08:50

## 2021-10-05 RX ADMIN — PIPERACILLIN AND TAZOBACTAM 3.38 G: 3; .375 INJECTION, POWDER, LYOPHILIZED, FOR SOLUTION INTRAVENOUS at 18:31

## 2021-10-05 RX ADMIN — ALLOPURINOL 100 MG: 100 TABLET ORAL at 08:50

## 2021-10-05 RX ADMIN — ASPIRIN 81 MG: 81 TABLET, COATED ORAL at 08:50

## 2021-10-05 RX ADMIN — BUMETANIDE 1 MG: 0.25 INJECTION, SOLUTION INTRAMUSCULAR; INTRAVENOUS at 21:56

## 2021-10-05 NOTE — PROGRESS NOTES
1830: Spoke with Dr. Kaelyn Go (patient seen by him outpatient)  per Dr. Pawel Villareal was told he would come see patient in the morning.

## 2021-10-05 NOTE — PROGRESS NOTES
TRANSFER - IN REPORT:    Verbal report received from Poonam RN (name) on Astrid Mott  being received from Emergency Department (unit) for routine progression of care      Report consisted of patients Situation, Background, Assessment and   Recommendations(SBAR). Information from the following report(s) SBAR, Kardex, ED Summary, MAR, Med Rec Status and Cardiac Rhythm AFIB with PVC was reviewed with the receiving nurse. Opportunity for questions and clarification was provided. Assessment completed upon patients arrival to unit and care assumed.

## 2021-10-05 NOTE — PROGRESS NOTES
FUNCTIONAL STATUS PRIOR TO ADMISSION: ambualted with SPC at times, performed all ADLs and IADLS without assist, driving    [address]: The patient lived alone. Occupational Therapy Goals:  Initiated 10/5/2021  1. Patient will perform grooming standing with rest breaks as needed with modified independence within 7 days. 2. Patient will perform toileting with modified independence within 7 days. 3. Patient will perform upper body dressing and lower body dressing with modified independence within 7 days. 4. Patient will transfer from toilet with modified independence using the least restrictive device and appropriate durable medical equipment within 7 days. OCCUPATIONAL THERAPY EVALUATION  Patient: Lalita Peters (45 y.o. male)  Date: 10/5/2021  Primary Diagnosis: CHF (congestive heart failure) ([facility]) [I50.9]  [facility] ([facility] [C02.9]       Precautions: monitor O2, fall       ASSESSMENT  Based on the objective data described below, the patient presents with decreased endurance and poor activity tolerance due to general weakness and inability to maintain O2 saturations with minimal activity. Upon arrival to room pt was supine in bed on 3L NC. O2 saturation was 88% and pt reported that he despirately wanted to sit up to urinate. Once seated edge of bed O2 sat decreased to 73% and pt needed 6L NC to recover sats over extended period of time to 88%. Pt was not overtly short of breath this session and noted that pt has a history of tongue cancer. Nursing notified of changed in O2 needs and called rapid response. O2 sat supine on 6L NC was 93%. He reports living alone and being completely independent with ADLS using SPC at times. Currently pt is performing sit to stand with min assist and needed min assist for standing balance and moderate assist to use urinal in standing.   Unable to progress past standing to void and assisted pt back to supine at end of session with nursing and rapid response team at bedside. Pt lives alone and will need SNF for rehab at this time. Current Level of Function Impacting Discharge (ADLs/self-care): min assist bed mobility and sit to stand,     Feeding: Setup    Oral Facial Hygiene/Grooming: Setup (seated)    Bathing: Moderate assistance    Upper Body Dressing: Setup    Lower Body Dressing: Moderate assistance    Toileting: Minimum assistance     Functional Outcome Measure: The patient scored 55/100 on the barthel outcome measure which is indicative of moderate decline in mobility and ADLS. Other factors to consider for discharge: was not on O2 prior to admit, lives alone, limited activity tolerance, fall risk     Patient will benefit from skilled therapy intervention to address the above noted impairments. PLAN :  Recommendations and Planned Interventions: self care training, functional mobility training, therapeutic exercise, balance training, therapeutic activities, patient education, home safety training and family training/education    Frequency/Duration: Patient will be followed by occupational therapy 3 times a week to address goals. Recommendation for discharge: (in order for the patient to meet his/her long term goals)  Therapy up to 5 days/week in SNF setting    This discharge recommendation:  Has been made in collaboration with the attending provider and/or case management    IF patient discharges home will need the following DME: possible home O2, pulse ox, shower chair       SUBJECTIVE:   Patient stated I really have to pee. It will take two people to get me up.     OBJECTIVE DATA SUMMARY:   HISTORY:   Past Medical History:   Diagnosis Date    [facility]     CAD (coronary artery disease)     Cancer ([facility]     Colon CA 2014 - polyp removed/chemo/radiation    Chronic obstructive pulmonary disease (HCC)     GERD (gastroesophageal reflux disease)     [facility] ([facility]     Hypertension     Sleep apnea not using machine because of surgery on tongue     Past Surgical History:   Procedure Laterality Date    COLONOSCOPY N/A 9/21/2018    COLONOSCOPY [facility]ierrez [facility] at [facility] ENDOSCOPY    COLONOSCOPY N/A 1/3/2020    COLONOSCOPY performed by Bora Reyna MD at [facility] ENDOSCOPY    COLONOSCOPY N/A 2/7/2020    COLONOSCOPY performed by [facility] at [facility] Left 5/29/2020    FLEXIBLE SIGMOIDOSCOPY WITH ARGON BEAM COAGULATION performed by Bora Reyna MD at [facility] ENDOSCOPY    HX AFIB ABLATION      HX GI      cancerous polyps removed  - pt usnure if done in OR or during colonoscopy    HX GI      colonoscopy x 2 - polyps both times    HX ORTHOPAEDIC      killed nerves in feet    VASCULAR SURGERY PROCEDURE UNLIST      stents legs bilaterally 2014/2015       Expanded or extensive additional review of patient history:     Home Situation  Home Environment: Private residence  # Steps to Enter: 3  Rails to Enter: Yes  Hand Rails : Right  One/Two Story Residence: One story  Living Alone: Yes  Current DME Used/Available at Home: Cane, straight    Hand dominance: Right    EXAMINATION OF PERFORMANCE DEFICITS:  Cognitive/Behavioral Status:  Neurologic State: Alert  Orientation Level: Oriented to situation;Oriented to person  Cognition: Follows commands     Perseveration: No perseveration noted  Safety/Judgement: Fall prevention      Vision/Perceptual:                           Acuity:  (grossly intact)         Range of Motion:    AROM: Within functional limits                         Strength:    Strength: Generally decreased, functional                Coordination:  Coordination: Generally decreased, functional  Fine Motor Skills-Upper: Left Intact; Right Intact    Gross Motor Skills-Upper: Left Intact; Right Intact    Tone & Sensation:    Tone: Normal  Sensation: Intact                      Balance:  Sitting: Intact  Standing: Impaired  Standing - Static: Fair  Standing - Dynamic : Fair    Functional Mobility and Transfers for ADLs:  Bed Mobility:  Rolling: Contact guard assistance  Supine to Sit: Minimum assistance  Sit to Supine: Minimum assistance  Scooting: Minimum assistance    Transfers:  Sit to Stand: Minimum assistance  Stand to Sit: Minimum assistance  Bed to Chair:  (Not tested due to low O2 sats)  Bathroom Mobility:  (unable due to low O2 sats)  Toilet Transfer :  (unable due to low O2 sats)    ADL Assessment:  Feeding: Setup    Oral Facial Hygiene/Grooming: Setup (seated)    Bathing: Moderate assistance    Upper Body Dressing: Setup    Lower Body Dressing: Moderate assistance    Toileting: Minimum assistance                ADL Intervention and task modifications:        see assessment       Cognitive Retraining  Safety/Judgement: Fall prevention      Functional Measure:  Barthel Index:    Bathin  Bladder: 10  Bowels: 10  Groomin  Dressin  Feeding: 10  Mobility: 0  Stairs: 0  Toilet Use: 5  Transfer (Bed to Chair and Back): 10  Total: 55/100        The Barthel ADL Index: Guidelines  1. The index should be used as a record of what a patient does, not as a record of what a patient could do. 2. The main aim is to establish degree of independence from any help, physical or verbal, however minor and for whatever reason. 3. The need for supervision renders the patient not independent. 4. A patient's performance should be established using the best available evidence. Asking the patient, friends/relatives and nurses are the usual sources, but direct observation and common sense are also important. However direct testing is not needed. 5. Usually the patient's performance over the preceding 24-48 hours is important, but occasionally longer periods will be relevant. 6. Middle categories imply that the patient supplies over 50 per cent of the effort. 7. Use of aids to be independent is allowed. Juan Jose Trinidad, Barthel, D.W. (2251).  Functional evaluation: the Barthel Index. [address] [facility] (14)2. YVON Cunningham, Mary Stevens., Severa Mouse., [place], [address] (). Measuring the change indisability after inpatient rehabilitation; comparison of the responsiveness of the Barthel Index and Functional [place] Measure. Journal of Neurology, Neurosurgery, and Psychiatry, 66(4), 652-482. PIEDAD Medellin, LEO Gomez, & Leda Duenas M.A. (2004.) Assessment of post-stroke quality of life in cost-effectiveness studies: The usefulness of the Barthel Index and the EuroQoL-5D. Quality of Life Research, 15, 812-21         Occupational Therapy Evaluation Charge Determination   History Examination Decision-Making   MEDIUM Complexity : Expanded review of history including physical, cognitive and psychosocial  history  MEDIUM Complexity : 3-5 performance deficits relating to physical, cognitive , or psychosocial skils that result in activity limitations and / or participation restrictions MEDIUM Complexity : Patient may present with comorbidities that affect occupational performnce. Miniml to moderate modification of tasks or assistance (eg, physical or verbal ) with assesment(s) is necessary to enable patient to complete evaluation       Based on the above components, the patient evaluation is determined to be of the following complexity level: MEDIUM  Pain Ratin/10    Activity Tolerance:   Poor, desaturates with exertion and requires oxygen and requires frequent rest breaks    After treatment patient left in no apparent distress:    Supine in bed and Call bell within reach    COMMUNICATION/EDUCATION:   The patients plan of care was discussed with: Physical therapist, Registered nurse and patient, pts nurse. Patient/family have participated as able in goal setting and plan of care. and Patient/family agree to work toward stated goals and plan of care. This patients plan of care is appropriate for delegation to [facility].     Thank you for this referral.  Natali Query, OTR/L  Time Calculation: 16 mins

## 2021-10-05 NOTE — ED NOTES
Pt's I.V. was not longer working, currently unable to place new I.V. causing delay in meds. Will continue to attempt I.V. access.

## 2021-10-05 NOTE — PROGRESS NOTES
Spiritual Care Assessment/Progress Note  [facility]      NAME: Wan Sheridan      MRN: [id number]  AGE: 69 y.o. SEX: male  Buddhism Affiliation: Moravian   Language: English     10/5/2021     Total Time (in minutes): 5     Spiritual Assessment begun in [facility] through conversation with:         []Patient        [] Family    [] Friend(s)        Reason for Consult: Initial/Spiritual assessment, patient floor     Spiritual beliefs: (Please include comment if needed)     [] Identifies with a sanjana tradition:         [] Supported by a sanjana community:            [] Claims no spiritual orientation:           [] Seeking spiritual identity:                [] Adheres to an individual form of spirituality:           [x] Not able to assess:                           Identified resources for coping:      [] Prayer                               [] Music                  [] Guided Imagery     [] Family/friends                 [] Pet visits     [] Devotional reading                         [x] Unknown     [] Other:                                               Interventions offered during this visit: (See comments for more details)    Patient Interventions: Crisis           Plan of Care:     [] Support spiritual and/or cultural needs    [] Support AMD and/or advance care planning process      [] Support grieving process   [] Coordinate Rites and/or Rituals    [] Coordination with community clergy   [] No spiritual needs identified at this time   [] Detailed Plan of Care below (See Comments)  [] Make referral to Music Therapy  [] Make referral to Pet Therapy     [] Make referral to Addiction services  [] Make referral to [facility]  [] Make referral to Spiritual Care Partner  [] No future visits requested        [x] Follow up upon further referrals     Comments:      responded to Rapid Response Team alert for Mr. Amanda Vogel in [phone].  Upon arrival, patient was in medical assessment and intervention. No family was present, unable to assess. Contact  if further needs arise.       [id number] [facility] LEILANI Marie.Ashlie,Rubina, Carlene 609 Provider   Paging Service [phone])

## 2021-10-05 NOTE — TELEPHONE ENCOUNTER
CONSULT    Patient:  Dione Cervantes    : [birth year]    Referring Physician: Shanta Ramirez    Reason: Reocc tongue cancer      Room #: 1806    Nurse: Florencio Melgar    Phone: [phone]

## 2021-10-05 NOTE — ED PROVIDER NOTES
EMERGENCY DEPARTMENT HISTORY AND PHYSICAL EXAM      Date: 10/4/2021  Patient Name: Zulema Gilliam    History of Presenting Illness     Chief Complaint   Patient presents with    Dental Pain     Patient has a history of tongue cancern in the past. For the past 3 months he has started having pain in his mouth on the right side.  Dental Pain    Shortness of Breath     While ending triage, patient stated that he also is having SOB with any movement - sats 85% in triage. Placed on 2L. History Provided By: Patient    HPI: Zulema Gilliam, 69 y.o. male  presents to the ED with cc of shortness of breath and right jaw pain. Patient has a history of CHF with an ejection fraction of 25%. Patient states his legs have been swollen and he has been short of breath for weeks. No chest pain. He says he does not have oxygen at home. He has not had oxygen requirement at home. He is currently on 80 of furosemide daily. Patient also has a history of tongue cancer about 1 to 2 years ago. He states for the past 3 months he has been having pain on the right side of his face and in his jaw. No fevers or chills. No nausea vomiting or diarrhea.     Past History     Past Medical History:  Past Medical History:   Diagnosis Date    Atrial fibrillation ([address]     CAD (coronary artery disease)     Cancer ([facility]     Colon CA 2014 - polyp removed/chemo/radiation    Chronic obstructive pulmonary disease (HCC)     GERD (gastroesophageal reflux disease)     Heart failure (HCC)     Hypertension     Sleep apnea     not using machine because of surgery on tongue       Past Surgical History:  Past Surgical History:   Procedure Laterality Date    COLONOSCOPY N/A 9/21/2018    COLONOSCOPY performed by Willima Armijo MD at [address] COLONOSCOPY N/A 1/3/2020    COLONOSCOPY performed by Harriet Canas MD at [address] COLONOSCOPY N/A 2/7/2020    COLONOSCOPY performed by Harriet Canas MD at [address] FLEXIBLE SIGMOIDOSCOPY Left 5/29/2020    FLEXIBLE SIGMOIDOSCOPY WITH ARGON BEAM COAGULATION performed by Tiffanie Cardenas MD at [facility] ENDOSCOPY    HX AFIB ABLATION      HX GI      cancerous polyps removed  - pt usnure if done in OR or during colonoscopy    HX GI      colonoscopy x 2 - polyps both times    HX ORTHOPAEDIC      killed nerves in feet    VASCULAR SURGERY PROCEDURE UNLIST      stents legs bilaterally 2014/2015       Medications:  No current facility-administered medications on file prior to encounter. Current Outpatient Medications on File Prior to Encounter   Medication Sig Dispense Refill    furosemide (LASIX) 40 mg tablet Take 1 Tablet by mouth every twelve (12) hours. 60 Tablet 1    allopurinoL (ZYLOPRIM) 100 mg tablet Take 1 Tablet by mouth daily. 30 Tablet 1    betamethasone dipropionate (DIPROSONE) 0.05 % topical cream Apply  to affected area two (2) times daily as needed for Skin Irritation.  tiotropium (SPIRIVA WITH HANDIHALER) 18 mcg inhalation capsule Take 1 Cap by inhalation daily.  fluticasone-salmeterol (ADVAIR DISKUS) 250-50 mcg/dose diskus inhaler Take 1 Puff by inhalation two (2) times a day.  gabapentin (NEURONTIN) 300 mg capsule Take 600 mg by mouth two (2) times a day.  aspirin delayed-release 81 mg tablet Take 81 mg by mouth daily.  clopidogrel (PLAVIX) 75 mg tab Take 75 mg by mouth daily.  pravastatin (PRAVACHOL) 40 mg tablet Take 40 mg by mouth daily.  carvedilol (COREG) 6.25 mg tablet Take 6.25 mg by mouth two (2) times a day.  digoxin (LANOXIN) 0.125 mg tablet Take 0.125 mg by mouth daily.  folic acid (FOLVITE) 1 mg tablet Take 1 Tab by mouth daily.  27 Tab 11       Family History:  Family History   Problem Relation Age of Onset    Alcohol abuse Father     Cancer Father         lung    Cancer Brother         throat    Stroke Brother        Social History:  Social History     Tobacco Use    Smoking status: Former Smoker Quit date: 3/1/2011     Years since quitting: 10.6    Smokeless tobacco: Never Used   Substance Use Topics    Alcohol use: No    Drug use: No       Allergies: Allergies   Allergen Reactions    Levaquin [Levofloxacin] Hives       All the above components of the past  history are auto-populated from the electronic record. They have been reviewed and the patient has been interviewed for any pertinent past history that pertains to the patient's chief complaint and reason for visit. Not all pre-populated components may be accurate at the time this note was generated. Review of Systems   Review of Systems   Constitutional: Negative for chills and fever. HENT: Negative for congestion, ear pain, rhinorrhea, sore throat and trouble swallowing. Right jaw pain   Eyes: Negative for visual disturbance. Respiratory: Positive for shortness of breath. Negative for cough and chest tightness. Cardiovascular: Positive for leg swelling. Negative for chest pain and palpitations. Gastrointestinal: Negative for abdominal pain, blood in stool, constipation, diarrhea, nausea and vomiting. Genitourinary: Negative for decreased urine volume, difficulty urinating, dysuria and frequency. Musculoskeletal: Negative for back pain and neck pain. Skin: Negative for color change and rash. Neurological: Negative for dizziness, weakness, light-headedness and headaches. Physical Exam   Physical Exam  Vitals and nursing note reviewed. Constitutional:       General: He is not in acute distress. Appearance: He is well-developed. He is not ill-appearing. HENT:      Head: Normocephalic. Eyes:      Conjunctiva/sclera: Conjunctivae normal.   Cardiovascular:      Rate and Rhythm: Bradycardia present. Rhythm irregular. Pulmonary:      Effort: Pulmonary effort is normal. No accessory muscle usage or respiratory distress. Breath sounds: Rales present. Abdominal:      General: There is no distension. Musculoskeletal:      Cervical back: Normal range of motion. Right lower leg: Edema present. Left lower leg: Edema present. Lymphadenopathy:      Cervical: Cervical adenopathy present. Right cervical: Superficial cervical adenopathy and deep cervical adenopathy present. Skin:     General: Skin is warm and dry. Neurological:      Mental Status: He is alert and oriented to person, place, and time. Due to the COVID-19 pandemic, in order to reduce the spread and transmission of the virus, some basic elements of the physical exam have been deferred to reduce direct or close contact with the patient unless they are deemed to be absolutely necessary, regardless of whether the virus is highly suspected or not. Diagnostic Study Results     Labs -     Recent Results (from the past 24 hour(s))   TROPONIN I    Collection Time: 10/04/21  8:49 PM   Result Value Ref Range    Troponin-I, Qt. <0.05 <0.05 ng/mL   NT-PRO BNP    Collection Time: 10/04/21  8:49 PM   Result Value Ref Range    NT pro-BNP 10,504 (H) <252 PG/ML   METABOLIC PANEL, COMPREHENSIVE    Collection Time: 10/04/21  8:49 PM   Result Value Ref Range    Sodium 136 136 - 145 mmol/L    Potassium 4.0 3.5 - 5.1 mmol/L    Chloride 104 97 - 108 mmol/L    CO2 32 21 - 32 mmol/L    Anion gap 0 (L) 5 - 15 mmol/L    Glucose 88 65 - 100 mg/dL    BUN 15 6 - 20 MG/DL    Creatinine 1.40 (H) 0.70 - 1.30 MG/DL    BUN/Creatinine ratio 11 (L) 12 - 20      GFR est AA >60 >60 ml/min/1.73m2    GFR est non-AA 50 (L) >60 ml/min/1.73m2    Calcium 8.3 (L) 8.5 - 10.1 MG/DL    Bilirubin, total 0.6 0.2 - 1.0 MG/DL    ALT (SGPT) 8 (L) 12 - 78 U/L    AST (SGOT) 9 (L) 15 - 37 U/L    Alk.  phosphatase 80 45 - 117 U/L    Protein, total 6.7 6.4 - 8.2 g/dL    Albumin 3.1 (L) 3.5 - 5.0 g/dL    Globulin 3.6 2.0 - 4.0 g/dL    A-G Ratio 0.9 (L) 1.1 - 2.2     CBC WITH AUTOMATED DIFF    Collection Time: 10/04/21  8:49 PM   Result Value Ref Range    WBC 3.3 (L) 4.1 - 11.1 K/uL RBC 3.52 (L) 4.10 - 5.70 M/uL    HGB 7.3 (L) 12.1 - 17.0 g/dL    HCT 25.0 (L) 36.6 - 50.3 %    MCV 71.0 (L) 80.0 - 99.0 FL    MCH 20.7 (L) 26.0 - 34.0 PG    MCHC 29.2 (L) 30.0 - 36.5 g/dL    RDW 20.7 (H) 11.5 - 14.5 %    PLATELET 277 551 - 237 K/uL    MPV 9.3 8.9 - 12.9 FL    NRBC 0.0 0  WBC    ABSOLUTE NRBC 0.00 0.00 - 0.01 K/uL    NEUTROPHILS 60 32 - 75 %    LYMPHOCYTES 23 12 - 49 %    MONOCYTES 10 5 - 13 %    EOSINOPHILS 6 0 - 7 %    BASOPHILS 1 0 - 1 %    IMMATURE GRANULOCYTES 0 0.0 - 0.5 %    ABS. NEUTROPHILS 2.0 1.8 - 8.0 K/UL    ABS. LYMPHOCYTES 0.8 0.8 - 3.5 K/UL    ABS. MONOCYTES 0.3 0.0 - 1.0 K/UL    ABS. EOSINOPHILS 0.2 0.0 - 0.4 K/UL    ABS. BASOPHILS 0.0 0.0 - 0.1 K/UL    ABS. IMM. GRANS. 0.0 0.00 - 0.04 K/UL    DF SMEAR SCANNED      RBC COMMENTS ANISOCYTOSIS  2+        RBC COMMENTS POLYCHROMASIA  PRESENT        RBC COMMENTS HYPOCHROMIA  3+           Radiologic Studies -   CT MAXILLOFACIAL W CONT   Final Result   Findings compatible with local recurrence of known tongue cancer on   the right with extension from the right tongue base to the mandibular body with   there is direct bony invasion as well as confluent right level 2 cervical chain   lymphadenopathy. Incidentals as above. CTA CHEST W OR W WO CONT   Final Result   1. No pulmonary embolism. 2. Mild pulmonary edema and increased moderate right pleural effusion. 3. Increased right middle lobe partial atelectasis versus pneumonia. Increased   mediastinal and right hilar lymphadenopathy. Consider underlying right middle   lobe bronchogenic neoplasm. XR CHEST PORT   Final Result   1. Persistent right middle lobe opacity.                  CT Results  (Last 48 hours)               10/04/21 2158  CT MAXILLOFACIAL W CONT Final result    Impression:  Findings compatible with local recurrence of known tongue cancer on   the right with extension from the right tongue base to the mandibular body with   there is direct bony invasion as well as confluent right level 2 cervical chain   lymphadenopathy. Incidentals as above. Narrative:  INDICATION: right mouth pain, history of tongue cancer       COMPARISON: None. CONTRAST: 100 mL of Isovue-300       TECHNIQUE:  Multislice helical CT of the facial bones was performed in the axial   plane during uneventful rapid bolus intravenous contrast administration. Coronal   and sagittal reformations were generated. CT dose reduction was achieved   through use of a standardized protocol tailored for this examination and   automatic exposure control for dose modulation. FINDINGS:       Bones: There is bone destruction along the lingual cortex of the right   mandibular body in association with soft tissue mass with peripheral enhancement   which extends to the mandibular angle posteriorly and measures 2.7 x 5.0 x 4.0   cm though is somewhat indistinctly marginated. There is enhancing soft tissue   which extends along the right carotid and jugular space bilaterally and direct   extension from this area with nodule-like prominence inferiorly measuring 1.3 x   1.0 cm, likely local lymphadenopathy. The mass extends medially into the tongue   base where there are surgical clips there is broad edema in the superficial soft   tissues of the right face overlying the mandible with no collection or evident   mass. Paranasal sinuses: Clear. Orbits: The globes, optic nerves, and extraocular muscles are within normal   limits. Base of brain: Within normal limits. No evidence of mass or pathologic   enhancement. Atherosclerotic calcination is of the cervical carotid and carotid siphons   noted. 10/04/21 2158  CTA CHEST W OR W WO CONT Final result    Impression:  1. No pulmonary embolism. 2. Mild pulmonary edema and increased moderate right pleural effusion. 3. Increased right middle lobe partial atelectasis versus pneumonia.  Increased   mediastinal and right hilar lymphadenopathy. Consider underlying right middle   lobe bronchogenic neoplasm. Narrative:  EXAM:  CTA CHEST W OR W WO CONT       INDICATION: Shortness of breath and hypoxia. COMPARISON: CT chest on 8/19/2021       TECHNIQUE: Helical thin section chest CT following uneventful intravenous   administration of nonionic contrast 100 mL of isovue 370 according to   departmental PE protocol. Coronal and sagittal reformats were performed. 3D post   processing was performed. CT dose reduction was achieved through the use of a   standardized protocol tailored for this examination and automatic exposure   control for dose modulation. FINDINGS: This is a good quality study for the evaluation of pulmonary embolism   to the first subsegmental arterial level. There is no pulmonary embolism to this   level. THYROID: No nodule. MEDIASTINUM: Inferior right paratracheal lymph node is increased and measures   2.0 x 1.9 cm. Prevascular lymph nodes are increased and measure 1.1 cm in short   axis. Subcarinal lymph node is increased and measures 1.6 cm in short axis. MELINDA: Right hilar lymphadenopathy measures up to 2.3 x 1.5 cm. Upper normal left   hilar lymph nodes. THORACIC AORTA: Atherosclerosis without aneurysm. HEART: Mild cardiomegaly. Unchanged small pericardial effusion. Coronary artery   calcific atherosclerosis is severe. ESOPHAGUS: No wall thickening or dilatation. TRACHEA/BRONCHI: Patent. PLEURA: Moderate right pleural effusion is increased. Trace left pleural   effusion is new. No pneumothorax. LUNGS: Right middle lobe partial atelectasis is increased. Interlobular septal   thickening is compatible with mild bilateral pulmonary edema. No measurable   mass. No suspicious nodule. No fibrosis. UPPER ABDOMEN: Hepatic surface is nodular. BONES: No aggressive bone lesion or fracture.                CXR Results  (Last 48 hours)               10/04/21 2042  XR CHEST PORT Final result    Impression:  1. Persistent right middle lobe opacity. Narrative:  INDICATION: . dyspnea   Additional history:   COMPARISON: Previous chest xray, 8/19/2021. A CT of the chest, 8/19/2021. LIMITATIONS: Portable technique. Rotation. Hemant Samaniego FINDINGS: Single frontal view of the chest.    .   Lines/tubes/surgical: Cardiac monitor leads overly the patient. Heart/mediastinum: Unremarkable. Lungs/pleura: Persistent opacity in the right middle lobe. No visualized pleural   effusion or pneumothorax. Additional Comments: None. .               Medical Decision Making     I reviewed the vital signs, available nursing notes, past medical history, past surgical history, family history and social history. Vital Signs-I have reviewed the vital signs that have been made available during the patient's emergency department visit. The vital signs auto-populated below are obtained mostly by electronic means through monitoring devices that have been downloaded into the patient's chart by the nursing staff. Some vital signs are not downloaded into the chart until after the patient has been discharged and this note has been completed, therefore some vital signs may not be available to the physician for review prior to patient's discharge or admission. The physician has reviewed the patient's triage vital signs, monitored the electronic monitoring devices remotely for any significant vital sign abnormalities, and have reviewed vital signs prior to discharge. Some vital signs reviewed at bedside or remotely utilizing electronic monitoring devices may be different than the vital signs downloaded into the electronic medical record. Some vital signs may be erroneous and inaccurate since they are obtained by electronic monitoring devices, and not all vital signs are verified for accuracy by nursing staff prior to downloading into the patient's chart.   Patient Vitals for the past 24 hrs:   Temp Pulse Resp BP SpO2   10/04/21 2214  60 20 116/61 94 %   10/04/21 2030  (!) 52 14 (!) 126/51 96 %   10/04/21 1839 98.3 °F (36.8 °C) (!) 55 18 (!) 122/44 (!) 87 %         Records Reviewed: Nursing notes for today's visit have been reviewed. I have also reviewed most recent medical records pertinent to today's complaints, if available in our medical record system. I have also reviewed all labs and imaging results from previous results in comparison to results obtained today. If an EKG was obtained today, it has been compared to previous EKGs, if available. If arriving via EMS, the EMS report has been reviewed if made available to us within the patient's time in the emergency department. Provider Notes (Medical Decision Making):   Patient with a history of CHF and tongue cancer presents with pain in the right jaw and having shortness of breath. Shortness of breath appears to be secondary to his CHF. He does have diffuse pulmonary edema. proBNP is above 10,000. Troponin levels are normal.  Imaging was obtained of the maxillofacial and chest.  Patient has evidence that his tongue cancer has reoccurred. He is destruction of the right jaw bone. He has lymphadenopathy in the neck as well. It is questionable some neoplastic process in the right lung around the right middle lobe as well. Patient was diuresed for the pulmonary edema with Bumex. Kidney function appears to be okay today. He is requiring supplemental oxygen with room air sats of 87%. ED Course:   Initial assessment performed. The patients presenting problems have been discussed, and they are in agreement with the care plan formulated and outlined with them. I have encouraged them to ask questions as they arise throughout their visit.          Orders Placed This Encounter    XR CHEST PORT    CT MAXILLOFACIAL W CONT    CTA CHEST W OR W WO CONT    TROPONIN I    PRO-BNP    COMPREHENSIVE METABOLIC PANEL    CBC WITH AUTOMATED DIFF    ADULT DIET Regular; Low Fat/Low Chol/High Fiber/2 gm Na    CARDIAC MONITORING    NOTIFY PROVIDER: SPECIFY Notify provider on pt's arrival to floor ONE TIME STAT    OUT OF BED WITH ASSISTANCE    VITAL SIGNS PER UNIT ROUTINE    FULL CODE    IP CONSULT TO HEMATOLOGY    EKG, 12 LEAD, INITIAL    SALINE LOCK IV ONE TIME STAT    bumetanide (BUMEX) injection 1 mg    iopamidoL (ISOVUE-370) 76 % injection 100 mL    acetaminophen (TYLENOL) tablet 650 mg    ondansetron (ZOFRAN) injection 4 mg    IP CONSULT TO HOSPITALIST    INITIAL PHYSICIAN ORDER: INPATIENT Telemetry; Yes; 3. Patient receiving treatment that can only be provided in an inpatient setting (further clarification in H&P documentation)       EKG    Date/Time: 10/4/2021 11:00 PM  Performed by: Paul Pardo MD  Authorized by: Paul Pardo MD     ECG reviewed by ED Physician in the absence of a cardiologist: yes    Rate:     ECG rate:  54    ECG rate assessment: bradycardic    Rhythm:     Rhythm: atrial fibrillation    Ectopy:     Ectopy: none    QRS:     QRS axis:  Normal  Conduction:     Conduction: abnormal      Abnormal conduction: complete RBBB    ST segments:     ST segments:  Normal  T waves:     T waves: non-specific            Critical Care Time:   0    Disposition:  Admit      Diagnosis     Clinical Impression:   1. Acute on chronic systolic congestive heart failure ([facility] [address]    2.  Tongue cancer ([facility] [address]

## 2021-10-05 NOTE — PROGRESS NOTES
RAPID RESPONSE TEAM    Responded to overhead RRT to 3302. Pt admitted with aspiration PNA, pulmonary edema, worsening R pleural effusion and possible R middle lobe bronchogenic neoplasm. Pt being diuresed with bumex, staff report pt up to use bathroom and desatted to 70-80%. State pt was not recovering on increased O2. Upon my arrival, pt in bed in NAD, resp even and unlabored. Pt sats improved/recovered, 99% on 6L NC. Pt with no complaints. Other VSS. Orders placed by Dr Zeina Ray for CXR, IP consult for thoracentesis and abx for PNA coverage. Pt to remain in room at this time. Patient Vitals for the past 8 hrs:   Temp Pulse Resp BP SpO2   10/05/21 1400  74 18 132/68 96 %   10/05/21 1354 98 °F (36.7 °C) 69 18 (!) 128/54 95 %   10/05/21 1216  75 18  96 %   10/05/21 1119 98.5 °F (36.9 °C) (!) 57 19 (!) 130/50 98 %   10/05/21 1009  (!) 50  (!) 118/57    10/05/21 0944  (!) 58 18 134/61 99 %   10/05/21 0937  61  137/60 90 %   10/05/21 0935     (!) 83 %   10/05/21 0933     (!) 73 %   10/05/21 0931     (!) 88 %   10/05/21 0855     95 %   10/05/21 0756 98.1 °F (36.7 °C) 62 22 (!) 158/73 96 %       XR Results (most recent):  Results from [place] encounter on 10/04/21    XR CHEST PORT    Narrative  EXAM: XR CHEST PORT    INDICATION: post thoracentesis    COMPARISON: Chest x-ray 10/5/2021 and chest x-ray 10/4/2021. FINDINGS: A portable AP radiograph of the chest was obtained at 14:08 hours. The  patient is on a cardiac monitor. There is been interval reduction of previously  seen subpulmonic pleural effusion with no pneumothorax. There is no significant  change in pulmonary vascular prominence with diffuse interstitial edema and  focal area of consolidation in the right middle lobe. The cardiac and  mediastinal contours and pulmonary vascularity are normal.  The bones and soft  tissues are grossly within normal limits.     Impression  Reduced right pleural effusion with no pneumothorax. Congestion with  interstitial edema and right middle lobe consolidation redemonstrated. Please call with any questions or concerns.      Ash Sheriff

## 2021-10-05 NOTE — PROGRESS NOTES
Hospitalist Progress Note    NAME: Jerzy Lou   :  1950   MRN:  [id number]       Assessment / Plan:  Aspiration Pneumonia POA - RML on CXR & CTA chest ?Malignant mass  With Worsening R Pleural Effusion POA- moderate  COPD  CHF  CTA showed 2. Mild pulmonary edema and increased moderate right pleural effusion. 3. Increased right middle lobe partial atelectasis versus pneumonia. Increased  mediastinal and right hilar lymphadenopathy. Consider underlying right middle  lobe bronchogenic neoplasm. May need to connsult pulm for possible neoplasm, await oncology recs  Cont oxygen to keep sats>90%  Start empiric IV ABx  (zosyb) to cover aspiration PNA- desaturated with SLP eval this AM- Rapid response called. IP consult for Thoracentesis-- Telephonic consent obtained from daughter Liseth Mccray at 12:30pm witnessed by LISETTE Muñoz Pleural fluid for routine tests plus cytology as ordered  Prednisone 40 mg PO daily  Diurese with Bumex 1 mg IV BID  ECHO when able  Monitor I/O  Daily weights  FR 1200 cc  BNP > 10k  procalcitonin level <0.05  Consult speech pt/ot when appropriate     Hx of tongue cancer s/p resection, XRT and chemo  Repeat CT scan showed Findings compatible with local recurrence of known tongue cancer on the right with extension from the right tongue base to the mandibular body with there is direct bony invasion as well as confluent right level 2 cervical chain  Lymphadenopathy  IP Consult oncology- awaiting input     Garbled/Slurred speech (12:30pm) not POA  R/o CVA versus Brain mets  Persistent afib POA- not on full anticoagulation ? reason  C/w coreg  C/w digoxin  Stat CT head r/o bleed  Then MRI brain to r/o mass/mets versus CVA  Cont ASA, plavix, lipitor  IP Neurology consult for Aphasia/R/o CVA  Stroke orderset triggered       EDGAR on CKD stage 3  Cr 1.4 on arrival  Baseline around 1.2  Avoid nephrotoxins  Trend Cr     Neuropathy  C/w gabapentin     Colon cancer            Code Status: Full Code   Surrogate Decision Maker: Adalgisa Pina     DVT Prophylaxis: Lovenox  GI Prophylaxis: not indicated     Baseline: independent      Estimated discharge date: October 8  Barriers: none at this time    Recommended Disposition: Home w/Family and HH PT, OT, RN? TBD     Subjective:     Chief Complaint / Reason for Physician Visit: F/U R pleural effusion moderate, Tongue Cancer, aspiration PNA  \"I am fine if I don't move\". Discussed with RN events overnight. Review of Systems:  Symptom Y/N Comments  Symptom Y/N Comments   Fever/Chills n   Chest Pain n    Poor Appetite n   Edema n    Cough y   Abdominal Pain     Sputum y   Joint Pain n    SOB/RILEY y   Pruritis/Rash     Nausea/vomit n   Tolerating PT/OT y    Diarrhea    Tolerating Diet n  NPO   Constipation    Other       Could NOT obtain due to:      Objective:     VITALS:   Last 24hrs VS reviewed since prior progress note.  Most recent are:  Patient Vitals for the past 24 hrs:   Temp Pulse Resp BP SpO2   10/05/21 1119 98.5 °F (36.9 °C) (!) 57 19 (!) 130/50 98 %   10/05/21 1009  (!) 50  (!) 118/57    10/05/21 0944  (!) 58  134/61 99 %   10/05/21 0937  61  137/60 90 %   10/05/21 0935     (!) 83 %   10/05/21 0933     (!) 73 %   10/05/21 0931     (!) 88 %   10/05/21 0855     95 %   10/05/21 0756 98.1 °F (36.7 °C) 62 22 (!) 158/73 96 %   10/05/21 0635  61 21 (!) 156/64 98 %   10/05/21 0430 97.5 °F (36.4 °C) (!) 59 18 (!) 144/43 100 %   10/05/21 0220  (!) 55 13 (!) 129/46 97 %   10/04/21 2330 97.5 °F (36.4 °C) (!) 53 21 (!) 143/61 99 %   10/04/21 2214  60 20 116/61 94 %   10/04/21 2030  (!) 52 14 (!) 126/51 96 %   10/04/21 1839 98.3 °F (36.8 °C) (!) 55 18 (!) 122/44 (!) 87 %       Intake/Output Summary (Last 24 hours) at 10/5/2021 1208  Last data filed at 10/5/2021 1020  Gross per 24 hour   Intake    Output 700 ml   Net -700 ml        I had a face to face encounter and independently examined this patient on 10/5/2021, as outlined below:  PHYSICAL EXAM:  General: WD, WN. Alert, cooperative, no acute distress    EENT:  EOMI. Anicteric sclerae. MMM  Resp:  Coarse BS on the R Lung field +, . No accessory muscle use  CV:  Regular  rhythm,  No edema, bradycardia noted +  GI:  Soft, Non distended, Non tender. +Bowel sounds  Neurologic:  Alert and oriented X 3, normal speech,   Psych:   Good insight. Not anxious nor agitated  Skin:  No rashes. No jaundice    Reviewed most current lab test results and cultures  YES  Reviewed most current radiology test results   YES  Review and summation of old records today    NO  Reviewed patient's current orders and MAR    YES  PMH/SH reviewed - no change compared to H&P  ________________________________________________________________________  Care Plan discussed with:    Comments   Patient x    Family  x Daughter Kiera Music On phone   RN x    Care Manager x Rita   Consultant                        Multidiciplinary team rounds were held today with , nursing, pharmacist and clinical coordinator. Patient's plan of care was discussed; medications were reviewed and discharge planning was addressed. ________________________________________________________________________  Total NON critical care TIME:  36   Minutes    Total CRITICAL CARE TIME Spent:   Minutes non procedure based      Comments   >50% of visit spent in counseling and coordination of care     ________________________________________________________________________  Juan Estrada MD     Procedures: see electronic medical records for all procedures/Xrays and details which were not copied into this note but were reviewed prior to creation of Plan. LABS:  I reviewed today's most current labs and imaging studies.   Pertinent labs include:  Recent Labs     10/05/21  0448 10/04/21  2049   WBC 3.1* 3.3*   HGB 8.2* 7.3*   HCT 28.5* 25.0*    158     Recent Labs     10/05/21  0448 10/04/21  2049    136   K 4.1 4.0    104   CO2 33* 32   GLU 85 88   BUN 15 15   CREA 1.40* 1.40*   CA 8.4* 8.3*   MG 2.6*  --    ALB  --  3.1*   TBILI  --  0.6   ALT  --  8*       Signed: Matt Lynch MD

## 2021-10-05 NOTE — PROGRESS NOTES
Transition of Care Plan:    RUR: 22%  Disposition: Home with Home Health vs Home with Follow-up Appointments. Follow up appointments:PCP; Cardiologist  DME needed: None  Transportation at Discharge: Pt's daughter Teodoro Kehr will transport at d/c.   Alice Slider or means to access home:  Pt has keys       IM Medicare Letter: 2nd IM Letter needed  Is patient a BCPI-A Bundle:  N/A         If yes, was Bundle Letter given?:  N/A   Caregiver Contact:Janice Varma- Daughter [phone]  Discharge Caregiver contacted prior to discharge? CM will notify caregiver at d/c.     Reason for Admission:   CHF                    RUR Score:  22 Moderate risk for readmission                 PCP: First and Last name:   Shelton Avalos MD     Name of Practice:    Are you a current patient: Yes/No: Yes   Approximate date of last visit: Unknown   Can you participate in a virtual visit if needed: No    Do you (patient/family) have any concerns for transition/discharge? None                 Plan for utilizing home health:  Pt has home health in the past. Pt's daughter stated that pt is not receptive to home health      Current Advanced Directive/Advance Care Plan:  Full Code; Pt has an ACP on file. Advance Care Planning     General Advance Care Planning (ACP) Conversation      Date of Conversation: 10/5/2021  Conducted with: Patient with Decision Making Capacity    Healthcare Decision Maker:     Primary Decision Maker: Vicki Rios - Child - [phone]  Click here to complete [address] including selection of the Healthcare Decision Maker Relationship (ie \"Primary\")      Today we documented Decision Maker(s) consistent with ACP documents on file. Content/Action Overview:    Has ACP document(s) on file - reflects the patient's care preferences  Reviewed DNR/DNI and patient elects Full Code (Attempt Resuscitation)  Length of Voluntary ACP Conversation in minutes:  16 minutes          CM met with pt' daughter at bedside to discuss d/c plan. Pt was off the floor. CM verified pt's demographics, insurance and PCP. Pt is a 68 y/o  male admitted to [facility] on 10/4/2021 for CHF. Pt's PCP is Dr. Marlene Osorio. Pt uses [address] for Rx on [employer] for Rx. Pt resides alone in an one level home with 3 LLUVIA. Pt is independent with ADL's and IADL's. Pt does drive. Pt has home oxygen supplied by [address]. Pt has had home health in the past. No SNF or IPR in the past. Pt is FULL code status. Pt does have an ACP on file. Pt's daughter Tio Ernst will transport at d/c. Care Management Interventions  PCP Verified by CM: Yes (Pt's PCP is Dr. Marlene Osorio.)  Palliative Care Criteria Met (RRAT>21 & CHF Dx)?: No  Mode of Transport at Discharge: Other (see comment) (Pt's daughter Tio Ernst will transport at d/c. )  Transition of Care Consult (CM Consult): Discharge Planning (Home with Home Health vs Home with Follow-up Appointments.)  Discharge Durable Medical Equipment: No (Pt has home oxygen supplied by [address]  Physical Therapy Consult: No  Occupational Therapy Consult: No  Speech Therapy Consult: No  Support Systems: Other (Comment) (Pt resides alone in an one level home with 3 LLUVIA.)  Confirm Follow Up Transport: Self (Pt does drive)  Discharge Location  Discharge Placement:  (Home with Home Health vs Home with Follow-up Appointments.)    CM will continue to follow patient for discharge planning needs and arrange for services as deemed necessary.     Lenin Hamm [address]  [phone]

## 2021-10-05 NOTE — PROGRESS NOTES
Problem: Mobility Impaired (Adult and Pediatric)  Goal: *Acute Goals and Plan of Care (Insert Text)  Description: FUNCTIONAL STATUS PRIOR TO ADMISSION: Pt lives alone in one story home. He is normally independent with all activity with use of a cane although does reports some difficulty donning shoes and socks. He was noted in PT session on recent admission 8/24/21 to be able to amb 150'x2 with S only, no device at that time on 2L. HOME SUPPORT PRIOR TO ADMISSION: The patient lived alone with no local support. Physical Therapy Goals  Initiated 10/5/2021  1. Patient will move from supine to sit and sit to supine , scoot up and down, and roll side to side in bed with independence within 7 day(s). 2.  Patient will transfer from bed to chair and chair to bed with modified independence using the least restrictive device within 7 day(s). 3.  Patient will perform sit to stand with modified independence within 7 day(s). 4.  Patient will ambulate with modified independence for 150 feet with the least restrictive device within 7 day(s). 5.  Patient will ascend/descend 3 stairs with handrail(s) with modified independence within 7 day(s). Outcome: Not Met  PHYSICAL THERAPY EVALUATION  Patient: Tanya Pineda (88 y.o. male)  Date: 10/5/2021  Primary Diagnosis: CHF (congestive heart failure) ([facility]) [I50.9]  Tongue cancer ([facility] [C02.9]        Precautions: fall, O2       ASSESSMENT  Based on the objective data described below, the patient presents with increased O2 needs, SOB, low sats with activity, limited activity tolerance and gait/functional mobility decreased below his independent baseline. Current Level of Function Impacting Discharge (mobility/balance): Pt on 3L O2 upon entry to room. Pt distressed stating he needed to use the urinal and was unable to go when in the bed. RN confirmed and was in room as well.  Pt was able to complete bed mobility and transfer to stand with min A  and was able to maintain stand with min A using bed to assist to stabilize. However, sats initially dropping to 88%, O2 needed to be increased to 5 and then 6L with activity and sats still dropping to 73% with recovery only to low 80s on the 6L. RN called rapid response as pt at end range of capability of regular NC. Pt stated he is somewhat SOB but no other c/o. Pt returned to bed, semisupine and sats returned to 90-91% on the 6L NC at rest. Pt left with RN care to continue to assess. Unable to progress due to above. Functional Outcome Measure: The patient scored 55/100 on the barthel outcome measure which is indicative of 45% functional impairment. Other factors to consider for discharge: lives alone, fall risk, increased O2 requirements, poor activity tolerance on eval     Patient will benefit from skilled therapy intervention to address the above noted impairments. PLAN :  Recommendations and Planned Interventions: bed mobility training, transfer training, gait training, therapeutic exercises, patient and family training/education, and therapeutic activities      Frequency/Duration: Patient will be followed by physical therapy:  3 times a week to address goals. Recommendation for discharge: (in order for the patient to meet his/her long term goals)  Therapy up to 5 days/week in SNF setting pending progress    This discharge recommendation:  Has been made in collaboration with the attending provider and/or case management    IF patient discharges home will need the following DME: to be determined (TBD)         SUBJECTIVE:   Patient stated I have to use the urinal now! Ruiz Parks    OBJECTIVE DATA SUMMARY:   HISTORY:    Past Medical History:   Diagnosis Date    [facility] ([facility]     CAD (coronary artery disease)     Cancer ([facility]     Colon CA 2014 - polyp removed/chemo/radiation    Chronic obstructive pulmonary disease ([facility]     GERD (gastroesophageal reflux disease)     Heart failure ([facility]     Hypertension     Sleep apnea     not using machine because of surgery on tongue     Past Surgical History:   Procedure Laterality Date    COLONOSCOPY N/A 9/21/2018    COLONOSCOPY performed by [facility]by Carroll MD at [facility] ENDOSCOPY    COLONOSCOPY N/A 1/3/2020    COLONOSCOPY performed by Frank Mcmanus MD at [facility] ENDOSCOPY    COLONOSCOPY N/A 2/7/2020    COLONOSCOPY performed by Frank Mcmanus MD at [facility] Left 5/29/2020    FLEXIBLE SIGMOIDOSCOPY WITH ARGON BEAM COAGULATION performed by Frank Mcmanus MD at [facility] ENDOSCOPY    HX AFIB ABLATION      HX GI      cancerous polyps removed  - pt usnure if done in OR or during colonoscopy    HX GI      colonoscopy x 2 - polyps both times    HX ORTHOPAEDIC      killed nerves in feet    VASCULAR SURGERY PROCEDURE UNLIST      stents legs bilaterally 2014/2015       Personal factors and/or comorbidities impacting plan of care: COPD, CHF, afib, CA    Home Situation  Home Environment: Private residence  # Steps to Enter: 3  Rails to Enter: Yes  Hand Rails : Right  One/Two Story Residence: One story  Living Alone: Yes  Current DME Used/Available at Home: Cane, straight    EXAMINATION/PRESENTATION/DECISION MAKING:   Critical Behavior:  Neurologic State: Alert  Orientation Level: Oriented to situation, Oriented to person  Cognition: Follows commands  Safety/Judgement: Fall prevention    Range Of Motion:  AROM: Within functional limits                       Strength:    Strength: Generally decreased, functional                    Tone & Sensation:   Tone: Normal              Sensation: Intact               Coordination:  Coordination: Generally decreased, functional  Vision:   Acuity:  (grossly intact)  Functional Mobility:  Bed Mobility:  Rolling: Contact guard assistance  Supine to Sit: Minimum assistance  Sit to Supine: Minimum assistance  Scooting: Minimum assistance  Transfers:  Sit to Stand: Minimum assistance  Stand to Sit: Minimum assistance        Bed to Chair:  (Not tested due to low O2 sats)              Balance:   Sitting: Intact  Standing: Impaired  Standing - Static: Fair  Standing - Dynamic : Fair  Ambulation/Gait Training:              Gait Description (WDL):  (unable to assess due to low O2 sats)                         Functional Measure:  Barthel Index:    Bathin  Bladder: 10  Bowels: 10  Groomin  Dressin  Feeding: 10  Mobility: 0  Stairs: 0  Toilet Use: 5  Transfer (Bed to Chair and Back): 10  Total: 55/100       The Barthel ADL Index: Guidelines  1. The index should be used as a record of what a patient does, not as a record of what a patient could do. 2. The main aim is to establish degree of independence from any help, physical or verbal, however minor and for whatever reason. 3. The need for supervision renders the patient not independent. 4. A patient's performance should be established using the best available evidence. Asking the patient, friends/relatives and nurses are the usual sources, but direct observation and common sense are also important. However direct testing is not needed. 5. Usually the patient's performance over the preceding 24-48 hours is important, but occasionally longer periods will be relevant. 6. Middle categories imply that the patient supplies over 50 per cent of the effort. 7. Use of aids to be independent is allowed. Wesely Nguyen., Barthel, D.W. (2014). Functional evaluation: the Barthel Index. [facility] (14)2. Sandy Riding tito YVON Rowe, Amina Greer., Liliana Luna., [place], [address] [place] [address] (). Measuring the change indisability after inpatient rehabilitation; comparison of the responsiveness of the Barthel Index and Functional [place] Measure. Journal of Neurology, Neurosurgery, and Psychiatry, 66(4), 614-146. Mervat Willis, N.J.A, SABINA Gomez.J.LEILANI, & Siobhan Cochran, M.A. (2004.) Assessment of post-stroke quality of life in cost-effectiveness studies:  The usefulness of the Barthel Index and the EuroQoL-5D. Quality of Life Research, 15, 265-44           Physical Therapy Evaluation Charge Determination   History Examination Presentation Decision-Making   HIGH Complexity :3+ comorbidities / personal factors will impact the outcome/ POC  HIGH Complexity : 4+ Standardized tests and measures addressing body structure, function, activity limitation and / or participation in recreation  HIGH Complexity : Unstable and unpredictable characteristics  MEDIUM Complexity : FOTO score of 26-74      Based on the above components, the patient evaluation is determined to be of the following complexity level: MEDIUM    Pain Rating:  No c/o    Activity Tolerance:   Poor, see above and doc flow    After treatment patient left in no apparent distress:   Supine in bed, Call bell within reach, RN present, and Side rails x 3    COMMUNICATION/EDUCATION:   The patients plan of care was discussed with: Occupational therapist and Registered nurse. Fall prevention education was provided and the patient/caregiver indicated understanding., Patient/family have participated as able in goal setting and plan of care. , and Patient/family agree to work toward stated goals and plan of care.     Thank you for this referral.  Dimitri Isidro, PT   Time Calculation: 14 mins

## 2021-10-05 NOTE — PROGRESS NOTES
End of Shift Note    Bedside shift change report given to LISETTE Vargas (oncoming nurse) by Ronel Masters RN (offgoing nurse). Report included the following information SBAR, Kardex, ED Summary, Procedure Summary and Intake/Output    Shift worked:  Day     Shift summary and any significant changes:     NPO. Speech, PT, and OT need to re-evaluate. Neuro and hematology consulted. Pt's oncologist  [facility] was called and will be coming to see the pt tomorrow morning. Pt had a CT, MRI, and a Thoracentesis done today. Concerns for physician to address:       Zone phone for oncoming shift:          Activity:  Activity Level: Up with Assistance  Number times ambulated in hallways past shift: 0  Number of times OOB to chair past shift: 1    Cardiac:   Cardiac Monitoring: Yes           Access:   Current line(s): PIV     Genitourinary:   Urinary status: voiding    Respiratory:   O2 Device: Nasal cannula  Chronic home O2 use?: YES  Incentive spirometer at bedside: YES     GI:     Current diet:  DIET NPO  Passing flatus: YES  Tolerating current diet: YES       Pain Management:   Patient states pain is manageable on current regimen: N/A    Skin:  Ignacio Score: 17  Interventions: speciality bed, increase time out of bed and PT/OT consult    Patient Safety:  Fall Score:  Total Score: 1  Interventions: bed/chair alarm, gripper socks and pt to call before getting OOB       Length of Stay:  Expected LOS: - - -  Actual LOS: Andres Bowling 222, RN

## 2021-10-05 NOTE — PROGRESS NOTES
MRI Pending:    Need completed online Kardex MRI screening form. Sign and fax to [phone]. Call [phone] once faxed.

## 2021-10-05 NOTE — PROGRESS NOTES
TRANSFER - OUT REPORT:    Verbal report given to [address] [place] [address] Street (name) on [facility]  being transferred to Cardiopulmonary (unit) for routine progression of care       Report consisted of patients Situation, Background, Assessment and   Recommendations(SBAR). Information from the following report(s) SBAR, Kardex, ED Summary, Intake/Output, MAR, Accordion, Recent Results, Med Rec Status and Cardiac Rhythm AFIB was reviewed with the receiving nurse. Lines:   Peripheral IV 10/04/21 Right Hand (Active)   Site Assessment Clean, dry, & intact 10/05/21 0850   Phlebitis Assessment 0 10/05/21 0850   Infiltration Assessment 0 10/05/21 0850   Dressing Status Clean, dry, & intact 10/05/21 0850   Dressing Type Tape;Transparent 10/05/21 0850   Hub Color/Line Status Green;Flushed;Patent 10/04/21 2341   Alcohol Cap Used Yes 10/04/21 2341        Opportunity for questions and clarification was provided.       Patient transported with:   Monitor  O2 @ 4 liters  Tech

## 2021-10-05 NOTE — ED NOTES
Patient is being transferred to [address], Room # 7640. Report given to Jackie Faith RN on [facility] for routine progression of care. Report consisted of the following information SBAR, ED Summary, Intake/Output and MAR. Patient transferred to receiving unit by: Transport (RN or tech name). Outstanding consults needed: No     Next labs due: No     The following personal items will be sent with the patient during transfer to the floor:     All valuables:    Cardiac monitoring ordered: No     The following CURRENT information was reported to the receiving RN:    Code status: Full Code at time of transfer    Last set of vital signs:  Vital Signs  Level of Consciousness: Alert (0) (10/05/21 0635)  Temp: 97.5 °F (36.4 °C) (10/05/21 0430)  Temp Source: Oral (10/05/21 0430)  Pulse (Heart Rate): 61 (10/05/21 0635)  Heart Rate Source: Monitor (10/04/21 1839)  Resp Rate: 21 (10/05/21 0635)  BP: (!) 156/64 (10/05/21 0635)  MAP (Monitor): 88 (10/05/21 0635)  MAP (Calculated): 95 (10/05/21 0635)  BP 1 Location: Left upper arm (10/04/21 1839)  BP 1 Method: Automatic (10/04/21 1839)  BP Patient Position: Sitting (10/04/21 1839)  MEWS Score: 1 (10/05/21 0430)         Oxygen Therapy  O2 Sat (%): 98 % (10/05/21 0635)  Pulse via Oximetry: 61 beats per minute (10/05/21 0430)  O2 Device: Nasal cannula (10/05/21 0635)  O2 Flow Rate (L/min): 2 l/min (10/05/21 5157)      Last pain assessment:  Pain 1  Pain Scale 1: Numeric (0 - 10)  Pain Intensity 1: 0  Patient Stated Pain Goal: 0      Wounds: No     Urinary catheter: voiding  Is there a salomon order: No     LDAs:       Peripheral IV 10/04/21 Right Hand (Active)   Site Assessment Clean, dry, & intact 10/04/21 2341   Phlebitis Assessment 0 10/04/21 2341   Infiltration Assessment 0 10/04/21 2341   Dressing Status Clean, dry, & intact 10/04/21 2341   Dressing Type 4 X 4;Transparent 10/04/21 2341   Hub Color/Line Status Green;Flushed;Patent 10/04/21 2341   Alcohol Cap Used Yes 10/04/21 [address] for questions and clarification was provided.     Angelina Gerardo RN

## 2021-10-05 NOTE — PROGRESS NOTES
10/05/21 0931 10/05/21 0933 10/05/21 0935   Vital Signs   Pulse (Heart Rate)  --   --   --    Heart Rate Source  --   --   --    BP  --   --   --    MAP (Calculated)  --   --   --    BP 1 Location  --   --   --    BP 1 Method  --   --   --    BP Patient Position  --   --   --    O2 Sat (%) (!) 88 %  (supine at rest) (!) 73 %  (seated edge of bed) (!) 83 %   O2 Device Nasal cannula Nasal cannula Nasal cannula   O2 Flow Rate (L/min) 3 l/min 3 l/min 6 l/min      10/05/21 0937   Vital Signs   Pulse (Heart Rate) 61   Heart Rate Source Monitor   /60   MAP (Calculated) 86   BP 1 Location Right upper arm   BP 1 Method Automatic   BP Patient Position Supine   O2 Sat (%) 90 %   O2 Device Nasal cannula   O2 Flow Rate (L/min) 6 l/min

## 2021-10-05 NOTE — PROGRESS NOTES
Chart reviewed. Patient seen to have established care with Dr. Neville Watson from past treatment of colon cancer. Will have nursing staff contact [facility] for care continuity.

## 2021-10-05 NOTE — PROGRESS NOTES
Problem: Dysphagia (Adult)  Goal: *Acute Goals and Plan of Care (Insert Text)  Description: Initiated 10/5/2021  1. Patient will tolerate soft diet, thin liquids free of sequelae of aspiration within 7 days. 2. Patient will participate in MBS within 7 days. Outcome: Not Met   SPEECH LANGUAGE PATHOLOGY BEDSIDE SWALLOW EVALUATION  Patient: Luís Martinez (4 y.o. male)  Date: 10/5/2021  Primary Diagnosis: CHF (congestive heart failure) ([facility]) [I50.9]  Tongue cancer ([facility] [C02.9]        Precautions: fall       ASSESSMENT :  Based on the objective data described below, the patient presents with mild to moderate oral dysphagia as expected given his previous tongue cancer resection, now with recurrence, and no upper teeth. He denies ever having radiation for this cancer. Suspect a degree of pharyngeal swallow impairment given increased work of breathing and groaning after sipping thin liquids. It is possible that this is esophageal in nature, especially given his history of GERD and sensitivity to particularly cold liquids and ice. CTA of chest this admission shows, \"1. No pulmonary embolism. 2. Mild pulmonary edema and increased moderate right pleural effusion. 3. Increased right middle lobe partial atelectasis versus pneumonia. Increased mediastinal and right hilar lymphadenopathy. Consider underlying right middle lobe bronchogenic neoplasm. \" He also has previously documented R lower lobe airspace disease as far back as 2014. A MBS would be helpful to determine extent of pharyngeal dysphagia. Addendum: Rapid response called following SLP visit. Will hold off on MBS until patient is stable to leave the floor. Patient will benefit from skilled intervention to address the above impairments.   Patients rehabilitation potential is considered to be Fair     PLAN :  Recommendations and Planned Interventions:  Soft diet, thin liquids  MBS  Frequency/Duration: Patient will be followed by speech-language pathology 2 times a week to address goals. Discharge Recommendations: To Be Determined     SUBJECTIVE:   Patient stated I don't do well with ice. OBJECTIVE:     Past Medical History:   Diagnosis Date    Atrial fibrillation ([facility]     CAD (coronary artery disease)     Cancer ([facility]     Colon CA 2014 - polyp removed/chemo/radiation    Chronic obstructive pulmonary disease (HCC)     GERD (gastroesophageal reflux disease)     Heart failure (HCC)     Hypertension     Sleep apnea     not using machine because of surgery on tongue     Past Surgical History:   Procedure Laterality Date    COLONOSCOPY N/A 9/21/2018    COLONOSCOPY performed by Julio Peters MD at [facility] ENDOSCOPY    COLONOSCOPY N/A 1/3/2020    COLONOSCOPY performed by Padmini Wahl MD at [facility] ENDOSCOPY    COLONOSCOPY N/A 2/7/2020    COLONOSCOPY performed by Padmini Wahl MD at [place] [id number] Left 5/29/2020    FLEXIBLE SIGMOIDOSCOPY WITH ARGON BEAM COAGULATION performed by Padmini Wahl MD at [facility] ENDOSCOPY    HX AFIB ABLATION      HX GI      cancerous polyps removed  - pt usnure if done in OR or during colonoscopy    HX GI      colonoscopy x 2 - polyps both times    HX ORTHOPAEDIC      killed nerves in feet    VASCULAR SURGERY PROCEDURE UNLIST      stents legs bilaterally 2014/2015          Diet prior to admission: soft, thins  Current Diet:  regular, thins   Cognitive and Communication Status:  Neurologic State: Alert  Orientation Level: Oriented to situation, Oriented to person  Cognition: Follows commands     Perseveration: No perseveration noted     Oral Assessment:  Oral Assessment  Labial: No impairment  Dentition: Limited;Natural;Other (comment) (no upper teeth and does not have dentures)  Oral Hygiene: dry  Lingual: No impairment  Mandible:  (enlarged on R)  P.O. Trials:  Patient Position: semi upright in bed  Vocal quality prior to P.O.: Low volume  Consistency Presented: Puree; Solid; Thin liquid  How Presented: SLP-fed/presented;Self-fed/presented;Spoon;Cup/sip     Bolus Acceptance: No impairment  Bolus Formation/Control: Impaired  Type of Impairment:  (prolonged mastication, effort to keep bolus on L)              Aspiration Signs/Symptoms:  (grunting and increased respiratory rate after thins)                              Pain:  Pain Scale 1: Numeric (0 - 10)  Pain Intensity 1: 0       After treatment:   Patient left in no apparent distress in bed, Call bell within reach, and Nursing notified    COMMUNICATION/EDUCATION:   Patient was educated regarding purpose of SLP visit. He agreed to participate. The patient's plan of care including recommendations, planned interventions, and recommended diet changes were discussed with: Registered nurse. Patient understands intent and goals of therapy, but is neutral about his/her participation.     Thank you for this referral.  WISAM Chandra  Time Calculation: 20 mins

## 2021-10-05 NOTE — H&P
Hospitalist Admission Note    NAME: Toya Muller   :  1950   MRN:  [id number]     Date/Time:  10/5/2021 12:37 AM    Patient PCP: José Veronica MD  _____________________________________________________________________  Given the patient's current clinical presentation, I have a high level of concern for decompensation if discharged from the emergency department. Complex decision making was performed, which includes reviewing the patient's available past medical records, laboratory results, and x-ray films. My assessment of this patient's clinical condition and my plan of care is as follows. Assessment / Plan:  SOB  COPD  CHF  CTA showed 2. Mild pulmonary edema and increased moderate right pleural effusion. 3. Increased right middle lobe partial atelectasis versus pneumonia. Increased  mediastinal and right hilar lymphadenopathy. Consider underlying right middle  lobe bronchogenic neoplasm. May need to consnult pulm for possible neoplasm, await oncology recs  Prednisone 40 mg PO daily  Diurese with Bumex 1 mg IV BID  Repeat ECHO  Monitor I/O  Daily weights  FR 1200 cc  BNP > 10k  Tele  Check PCT  Consult speech pt/ot    Hx of tongue cancer s/p resection, XRT and chemo  Repeat CT scan showed Findings compatible with local recurrence of known tongue cancer on  the right with extension from the right tongue base to the mandibular body with  there is direct bony invasion as well as confluent right level 2 cervical chain  Lymphadenopathy  Consult oncology    Persistent afib  C/w coreg  C/w digoxin  Not on [facility]?     EDGAR on CKD stage 3  Cr 1.4 on arrival  Baseline around 1.2  Avoid nephrotoxins  Trend Cr    Neuropathy  C/w gabapentin    Colon cancer         Code Status: Full Code   Surrogate Decision Maker: Antonio Mello    DVT Prophylaxis: Lovenox  GI Prophylaxis: not indicated    Baseline: independent        Subjective:   CHIEF COMPLAINT:      HISTORY OF PRESENT ILLNESS:     French Men is a 69 y.o. male with past medical history significant for atrial fibrillation, tongue cancer, COPD, GERD, CHF EF 20-25% in 2014, HTN  who presents with worsening shortness of breath for the past 3 to 4 days. Patient states that he began to experience shortness of breath when he was walking and exerting himself. He denies any PND orthopnea. He also complains of worsening lower extremity swelling. He saw his nurse practitioner today who sent him to the ED for further evaluation. Patient denies any chest pain. He denies any cough. He states that he has not seen oncology for some time but was supposed to follow-up with them this week for his history of tongue cancer. He currently denies any difficulty swallowing. We were asked to admit for work up and evaluation of the above problems.      Past Medical History:   Diagnosis Date    Atrial fibrillation ([facility]     CAD (coronary artery disease)     Cancer ([facility]     Colon CA 2014 - polyp removed/chemo/radiation    Chronic obstructive pulmonary disease (HCC)     GERD (gastroesophageal reflux disease)     Heart failure (HCC)     Hypertension     Sleep apnea     not using machine because of surgery on tongue        Past Surgical History:   Procedure Laterality Date    COLONOSCOPY N/A 9/21/2018    COLONOSCOPY performed by Myke Lam MD at [address] COLONOSCOPY N/A 1/3/2020    COLONOSCOPY performed by Kedar Arce MD at [address] COLONOSCOPY N/A 2/7/2020    COLONOSCOPY performed by [facility]edar Arce MD at [facility] Left 5/29/2020    FLEXIBLE SIGMOIDOSCOPY WITH ARGON BEAM COAGULATION performed by Kedar Arce MD at [facility] ENDOSCOPY    HX AFIB ABLATION      HX GI      cancerous polyps removed  - pt usnure if done in OR or during colonoscopy    HX GI      colonoscopy x 2 - polyps both times    HX ORTHOPAEDIC      killed nerves in feet    VASCULAR SURGERY PROCEDURE UNLIST      stents legs bilaterally 2014/2015       Social History     Tobacco Use    Smoking status: Former Smoker     Quit date: 3/1/2011     Years since quitting: 10.6    Smokeless tobacco: Never Used   Substance Use Topics    Alcohol use: No        Family History   Problem Relation Age of Onset    Alcohol abuse Father     Cancer Father         lung    Cancer Brother         throat    Stroke Brother      Allergies   Allergen Reactions    Levaquin [Levofloxacin] Hives        Prior to Admission medications    Medication Sig Start Date End Date Taking? Authorizing Provider   furosemide (LASIX) 40 mg tablet Take 1 Tablet by mouth every twelve (12) hours. 8/24/21   Elida Miguel MD   allopurinoL (ZYLOPRIM) 100 mg tablet Take 1 Tablet by mouth daily. 8/24/21   Elida Miguel MD   betamethasone dipropionate (DIPROSONE) 0.05 % topical cream Apply  to affected area two (2) times daily as needed for Skin Irritation. Provider, Historical   tiotropium (SPIRIVA WITH HANDIHALER) 18 mcg inhalation capsule Take 1 Cap by inhalation daily. Provider, Historical   fluticasone-salmeterol (ADVAIR DISKUS) 250-50 mcg/dose diskus inhaler Take 1 Puff by inhalation two (2) times a day. Provider, Historical   gabapentin (NEURONTIN) 300 mg capsule Take 600 mg by mouth two (2) times a day. Provider, Historical   aspirin delayed-release 81 mg tablet Take 81 mg by mouth daily. Provider, Historical   clopidogrel (PLAVIX) 75 mg tab Take 75 mg by mouth daily. Provider, Historical   pravastatin (PRAVACHOL) 40 mg tablet Take 40 mg by mouth daily. Provider, Historical   carvedilol (COREG) 6.25 mg tablet Take 6.25 mg by mouth two (2) times a day. Provider, Historical   digoxin (LANOXIN) 0.125 mg tablet Take 0.125 mg by mouth daily. Provider, Historical   folic acid (FOLVITE) 1 mg tablet Take 1 Tab by mouth daily.  1/24/14   Xavi Savage MD       REVIEW OF SYSTEMS:     I am not able to complete the review of systems because: The patient is intubated and sedated    The patient has altered mental status due to his acute medical problems    The patient has baseline aphasia from prior stroke(s)    The patient has baseline dementia and is not reliable historian    The patient is in acute medical distress and unable to provide information           Total of 12 systems reviewed as follows:       POSITIVE= underlined text  Negative = text not underlined  General:  fever, chills, sweats, generalized weakness, weight loss/gain,      loss of appetite   Eyes:    blurred vision, eye pain, loss of vision, double vision  ENT:    rhinorrhea, pharyngitis   Gastrointestinal:  abdominal pain , N/V, diarrhea, dysphagia, constipation, bleeding   Genitourinary:  frequency, urgency, dysuria, hematuria, incontinence   Muskuloskeletal :  arthralgia, myalgia, back pain  Hematology:  easy bruising, nose or gum bleeding, lymphadenopathy   Dermatological: rash, ulceration, pruritis, color change / jaundice  Endocrine:   hot flashes or polydipsia   Neurological:  headache, dizziness, confusion, focal weakness, paresthesia,     Speech difficulties, memory loss, gait difficulty  Psychological: Feelings of anxiety, depression, agitation    Objective:   VITALS:    Visit Vitals  BP (!) 143/61   Pulse (!) 53   Temp 97.5 °F (36.4 °C)   Resp 21   Ht 5' 9\" (1.753 m)   Wt 81.6 kg (180 lb)   SpO2 99%   BMI 26.58 kg/m²       PHYSICAL EXAM:    General:    Alert, cooperative, no distress, appears stated age. HEENT: Atraumatic, anicteric sclerae, pink conjunctivae     No oral ulcers, mucosa moist, throat clear, dentition fair  Neck:  Supple, symmetrical,  thyroid: non tender  Lungs:   Mild wheezing on the left. No  Rhonchi. No rales. Chest wall:  No tenderness  No Accessory muscle use. Heart:   Irregular/irregular rhythm,  No  murmur   2+ LE edema  Abdomen:   Soft, non-tender. Not distended. Bowel sounds normal  Extremities: No cyanosis.   No clubbing,      Skin turgor normal, Capillary refill normal, Radial dial pulse 2+  Skin:     Not pale. Not Jaundiced  No rashes   Psych:  Good insight. Not depressed. Not anxious or agitated. Neurologic: EOMs intact. No facial asymmetry. No aphasia He does have slurred speech from his previous tongue cancer. Symmetrical strength, Sensation grossly intact. Alert and oriented X 4.     _______________________________________________________________________  Care Plan discussed with:    Comments   Patient     Family      RN     Care Manager                    Consultant:      _______________________________________________________________________  Expected  Disposition:   Home with Family    HH/PT/OT/RN    SNF/LTC    RON    ________________________________________________________________________  TOTAL TIME:    Minutes    Critical Care Provided     Minutes non procedure based      Comments     Reviewed previous records   >50% of visit spent in counseling and coordination of care  Discussion with patient and/or family and questions answered       Given the patient's current clinical presentation, I have a high level of concern for decompensation if discharged from the ED. Complex decision making was performed which includes reviewing the patient's available past medical records, laboratory results, and Xray films. I have also directly communicated my plan and discussed this case with the involved ED physician.     ____________________________________________________________________  Jeana Mackay MD    Procedures: see electronic medical records for all procedures/Xrays and details which were not copied into this note but were reviewed prior to creation of Plan.     LAB DATA REVIEWED:    Recent Results (from the past 24 hour(s))   TROPONIN I    Collection Time: 10/04/21  8:49 PM   Result Value Ref Range    Troponin-I, Qt. <0.05 <0.05 ng/mL   NT-PRO BNP    Collection Time: 10/04/21  8:49 PM   Result Value Ref Range    NT pro-BNP 10,504 (H) <125 PG/ML   METABOLIC PANEL, COMPREHENSIVE    Collection Time: 10/04/21  8:49 PM   Result Value Ref Range    Sodium 136 136 - 145 mmol/L    Potassium 4.0 3.5 - 5.1 mmol/L    Chloride 104 97 - 108 mmol/L    CO2 32 21 - 32 mmol/L    Anion gap 0 (L) 5 - 15 mmol/L    Glucose 88 65 - 100 mg/dL    BUN 15 6 - 20 MG/DL    Creatinine 1.40 (H) 0.70 - 1.30 MG/DL    BUN/Creatinine ratio 11 (L) 12 - 20      GFR est AA >60 >60 ml/min/1.73m2    GFR est non-AA 50 (L) >60 ml/min/1.73m2    Calcium 8.3 (L) 8.5 - 10.1 MG/DL    Bilirubin, total 0.6 0.2 - 1.0 MG/DL    ALT (SGPT) 8 (L) 12 - 78 U/L    AST (SGOT) 9 (L) 15 - 37 U/L    Alk. phosphatase 80 45 - 117 U/L    Protein, total 6.7 6.4 - 8.2 g/dL    Albumin 3.1 (L) 3.5 - 5.0 g/dL    Globulin 3.6 2.0 - 4.0 g/dL    A-G Ratio 0.9 (L) 1.1 - 2.2     CBC WITH AUTOMATED DIFF    Collection Time: 10/04/21  8:49 PM   Result Value Ref Range    WBC 3.3 (L) 4.1 - 11.1 K/uL    RBC 3.52 (L) 4.10 - 5.70 M/uL    HGB 7.3 (L) 12.1 - 17.0 g/dL    HCT 25.0 (L) 36.6 - 50.3 %    MCV 71.0 (L) 80.0 - 99.0 FL    MCH 20.7 (L) 26.0 - 34.0 PG    MCHC 29.2 (L) 30.0 - 36.5 g/dL    RDW 20.7 (H) 11.5 - 14.5 %    PLATELET 469 256 - 155 K/uL    MPV 9.3 8.9 - 12.9 FL    NRBC 0.0 0  WBC    ABSOLUTE NRBC 0.00 0.00 - 0.01 K/uL    NEUTROPHILS 60 32 - 75 %    LYMPHOCYTES 23 12 - 49 %    MONOCYTES 10 5 - 13 %    EOSINOPHILS 6 0 - 7 %    BASOPHILS 1 0 - 1 %    IMMATURE GRANULOCYTES 0 0.0 - 0.5 %    ABS. NEUTROPHILS 2.0 1.8 - 8.0 K/UL    ABS. LYMPHOCYTES 0.8 0.8 - 3.5 K/UL    ABS. MONOCYTES 0.3 0.0 - 1.0 K/UL    ABS. EOSINOPHILS 0.2 0.0 - 0.4 K/UL    ABS. BASOPHILS 0.0 0.0 - 0.1 K/UL    ABS. IMM.  GRANS. 0.0 0.00 - 0.04 K/UL    DF SMEAR SCANNED      RBC COMMENTS ANISOCYTOSIS  2+        RBC COMMENTS POLYCHROMASIA  PRESENT        RBC COMMENTS HYPOCHROMIA  3+       EKG, 12 LEAD, INITIAL    Collection Time: 10/04/21 11:00 PM   Result Value Ref Range    Ventricular Rate 54 BPM    Atrial Rate 416 BPM    QRS Duration 134 ms    Q-T Interval 436 ms    QTC Calculation (Bezet) 413 ms    Calculated R Axis 96 degrees    Calculated T Axis -122 degrees    Diagnosis       ** Poor data quality, interpretation may be adversely affected  Atrial fibrillation with slow ventricular response  Right bundle branch block  Septal infarct (cited on or before 04-OCT-2021)  T wave abnormality, consider lateral ischemia  When compared with ECG of 19-AUG-2021 09:58,  Vent.  rate has decreased BY  26 BPM  Serial changes of Septal infarct present

## 2021-10-05 NOTE — PROGRESS NOTES
Name of procedure: Thoracentesis Right    Vital Signs: stable    Fluids removed: 1100 ml    Samples sent to lab: Yes    Any complications related to procedure: none    Post Procedure Care Needed/order sets placed in connect care: See orders    Pt tolerated procedure well. VSS. No C/O pain. Dressing to site D&I. No bleeding or hematoma noted to site. HOB elevated. PCXR complete. Pt taken to CT for ordered scan. Pt taken to room by transport. NAD noted at time of transfer. Report called to [facility], RN who will assume care of pt. Pt taken to room 4392.

## 2021-10-06 LAB
ANION GAP SERPL CALC-SCNC: ABNORMAL MMOL/L (ref 5–15)
ATRIAL RATE: 416 BPM
BUN SERPL-MCNC: 16 MG/DL (ref 6–20)
BUN/CREAT SERPL: 12 (ref 12–20)
CALCIUM SERPL-MCNC: 8.2 MG/DL (ref 8.5–10.1)
CALCULATED R AXIS, ECG10: 96 DEGREES
CALCULATED T AXIS, ECG11: -122 DEGREES
CHLORIDE SERPL-SCNC: 106 MMOL/L (ref 97–108)
CHOLEST SERPL-MCNC: 90 MG/DL
CO2 SERPL-SCNC: 34 MMOL/L (ref 21–32)
CREAT SERPL-MCNC: 1.36 MG/DL (ref 0.7–1.3)
DIAGNOSIS, 93000: NORMAL
DIGOXIN SERPL-MCNC: 1.8 NG/ML (ref 0.9–2)
ERYTHROCYTE [DISTWIDTH] IN BLOOD BY AUTOMATED COUNT: 20.8 % (ref 11.5–14.5)
EST. AVERAGE GLUCOSE BLD GHB EST-MCNC: 120 MG/DL
GLUCOSE SERPL-MCNC: 165 MG/DL (ref 65–100)
HBA1C MFR BLD: 5.8 % (ref 4–5.6)
HCT VFR BLD AUTO: 31.2 % (ref 36.6–50.3)
HDLC SERPL-MCNC: 33 MG/DL
HDLC SERPL: 2.7 {RATIO} (ref 0–5)
HGB BLD-MCNC: 8.4 G/DL (ref 12.1–17)
IRON SATN MFR SERPL: 4 % (ref 20–50)
IRON SERPL-MCNC: 15 UG/DL (ref 35–150)
LDH SERPL L TO P-CCNC: 148 U/L (ref 85–241)
LDLC SERPL CALC-MCNC: 44.4 MG/DL (ref 0–100)
MCH RBC QN AUTO: 20.4 PG (ref 26–34)
MCHC RBC AUTO-ENTMCNC: 26.9 G/DL (ref 30–36.5)
MCV RBC AUTO: 75.9 FL (ref 80–99)
NRBC # BLD: 0 K/UL (ref 0–0.01)
NRBC BLD-RTO: 0 PER 100 WBC
PLATELET # BLD AUTO: 168 K/UL (ref 150–400)
PMV BLD AUTO: 10.1 FL (ref 8.9–12.9)
POTASSIUM SERPL-SCNC: 4.3 MMOL/L (ref 3.5–5.1)
Q-T INTERVAL, ECG07: 436 MS
QRS DURATION, ECG06: 134 MS
QTC CALCULATION (BEZET), ECG08: 413 MS
RBC # BLD AUTO: 4.11 M/UL (ref 4.1–5.7)
SODIUM SERPL-SCNC: 139 MMOL/L (ref 136–145)
TIBC SERPL-MCNC: 426 UG/DL (ref 250–450)
TRIGL SERPL-MCNC: 63 MG/DL (ref ?–150)
VENTRICULAR RATE, ECG03: 54 BPM
VLDLC SERPL CALC-MCNC: 12.6 MG/DL
WBC # BLD AUTO: 2 K/UL (ref 4.1–11.1)

## 2021-10-06 PROCEDURE — 74011250637 HC RX REV CODE- 250/637: Performed by: INTERNAL MEDICINE

## 2021-10-06 PROCEDURE — 74011250636 HC RX REV CODE- 250/636: Performed by: INTERNAL MEDICINE

## 2021-10-06 PROCEDURE — 84630 ASSAY OF ZINC: CPT

## 2021-10-06 PROCEDURE — 65660000001 HC RM ICU INTERMED STEPDOWN

## 2021-10-06 PROCEDURE — 94640 AIRWAY INHALATION TREATMENT: CPT

## 2021-10-06 PROCEDURE — 97530 THERAPEUTIC ACTIVITIES: CPT

## 2021-10-06 PROCEDURE — 77010033678 HC OXYGEN DAILY

## 2021-10-06 PROCEDURE — 82525 ASSAY OF COPPER: CPT

## 2021-10-06 PROCEDURE — 97116 GAIT TRAINING THERAPY: CPT

## 2021-10-06 PROCEDURE — 83540 ASSAY OF IRON: CPT

## 2021-10-06 PROCEDURE — 83615 LACTATE (LD) (LDH) ENZYME: CPT

## 2021-10-06 PROCEDURE — 74011000258 HC RX REV CODE- 258: Performed by: INTERNAL MEDICINE

## 2021-10-06 PROCEDURE — 97535 SELF CARE MNGMENT TRAINING: CPT

## 2021-10-06 PROCEDURE — 85027 COMPLETE CBC AUTOMATED: CPT

## 2021-10-06 PROCEDURE — 83036 HEMOGLOBIN GLYCOSYLATED A1C: CPT

## 2021-10-06 PROCEDURE — 99223 1ST HOSP IP/OBS HIGH 75: CPT | Performed by: PSYCHIATRY & NEUROLOGY

## 2021-10-06 PROCEDURE — 80162 ASSAY OF DIGOXIN TOTAL: CPT

## 2021-10-06 PROCEDURE — 74011636637 HC RX REV CODE- 636/637: Performed by: INTERNAL MEDICINE

## 2021-10-06 PROCEDURE — 74011000250 HC RX REV CODE- 250: Performed by: INTERNAL MEDICINE

## 2021-10-06 PROCEDURE — 92526 ORAL FUNCTION THERAPY: CPT | Performed by: SPEECH-LANGUAGE PATHOLOGIST

## 2021-10-06 PROCEDURE — 80048 BASIC METABOLIC PNL TOTAL CA: CPT

## 2021-10-06 PROCEDURE — 36415 COLL VENOUS BLD VENIPUNCTURE: CPT

## 2021-10-06 PROCEDURE — 80061 LIPID PANEL: CPT

## 2021-10-06 PROCEDURE — 94760 N-INVAS EAR/PLS OXIMETRY 1: CPT

## 2021-10-06 PROCEDURE — 74011250637 HC RX REV CODE- 250/637: Performed by: EMERGENCY MEDICINE

## 2021-10-06 RX ORDER — CARVEDILOL 3.12 MG/1
3.12 TABLET ORAL 2 TIMES DAILY
Status: DISCONTINUED | OUTPATIENT
Start: 2021-10-06 | End: 2021-10-09 | Stop reason: HOSPADM

## 2021-10-06 RX ADMIN — PREDNISONE 40 MG: 20 TABLET ORAL at 08:36

## 2021-10-06 RX ADMIN — CLOPIDOGREL BISULFATE 75 MG: 75 TABLET ORAL at 08:36

## 2021-10-06 RX ADMIN — PIPERACILLIN AND TAZOBACTAM 3.38 G: 3; .375 INJECTION, POWDER, LYOPHILIZED, FOR SOLUTION INTRAVENOUS at 08:36

## 2021-10-06 RX ADMIN — ACETAMINOPHEN 650 MG: 325 TABLET ORAL at 16:53

## 2021-10-06 RX ADMIN — ASPIRIN 81 MG: 81 TABLET, COATED ORAL at 08:36

## 2021-10-06 RX ADMIN — ACETAMINOPHEN 650 MG: 325 TABLET ORAL at 23:27

## 2021-10-06 RX ADMIN — BUMETANIDE 1 MG: 0.25 INJECTION, SOLUTION INTRAMUSCULAR; INTRAVENOUS at 11:57

## 2021-10-06 RX ADMIN — PIPERACILLIN AND TAZOBACTAM 3.38 G: 3; .375 INJECTION, POWDER, LYOPHILIZED, FOR SOLUTION INTRAVENOUS at 00:44

## 2021-10-06 RX ADMIN — BUDESONIDE 500 MCG: 0.5 INHALANT RESPIRATORY (INHALATION) at 19:52

## 2021-10-06 RX ADMIN — ENOXAPARIN SODIUM 40 MG: 40 INJECTION SUBCUTANEOUS at 06:29

## 2021-10-06 RX ADMIN — BUMETANIDE 1 MG: 0.25 INJECTION, SOLUTION INTRAMUSCULAR; INTRAVENOUS at 20:28

## 2021-10-06 RX ADMIN — PIPERACILLIN AND TAZOBACTAM 3.38 G: 3; .375 INJECTION, POWDER, LYOPHILIZED, FOR SOLUTION INTRAVENOUS at 16:43

## 2021-10-06 RX ADMIN — DIGOXIN 0.12 MG: 125 TABLET ORAL at 08:36

## 2021-10-06 RX ADMIN — CARVEDILOL 3.12 MG: 3.12 TABLET, FILM COATED ORAL at 17:27

## 2021-10-06 RX ADMIN — ARFORMOTEROL TARTRATE 15 MCG: 15 SOLUTION RESPIRATORY (INHALATION) at 19:52

## 2021-10-06 RX ADMIN — ALLOPURINOL 100 MG: 100 TABLET ORAL at 08:36

## 2021-10-06 RX ADMIN — FOLIC ACID 1 MG: 1 TABLET ORAL at 08:36

## 2021-10-06 RX ADMIN — PIPERACILLIN AND TAZOBACTAM 3.38 G: 3; .375 INJECTION, POWDER, LYOPHILIZED, FOR SOLUTION INTRAVENOUS at 23:29

## 2021-10-06 RX ADMIN — PRAVASTATIN SODIUM 40 MG: 40 TABLET ORAL at 08:36

## 2021-10-06 RX ADMIN — CARVEDILOL 6.25 MG: 6.25 TABLET, FILM COATED ORAL at 08:36

## 2021-10-06 NOTE — CONSULTS
[id number]    Name:  Elsie Jimenez  MR#:  [id number]  :  1950  ACCOUNT #:  [de-identified]  DATE OF SERVICE:  10/06/2021    REFERRING PHYSICIAN:  Dr. Burrell Staff. REASON FOR CONSULTATION:  History of anal cancer and head and neck cancer. HISTORY OF PRESENT ILLNESS:  The patient is a 67-year-old gentleman who I treated for an anal cancer back in . He got 5-FU and mitomycin, he did well. He had a complete response. He had been following with Dr. Mehul Gil. CT scan that showed no evidence of metastatic disease progression. He evidently had a tongue cancer that was removed earlier this year, I did not know about that. It was removed by he reports Dr. Allan Garner of [facility]. I do not have any those records. The patient came to the hospital yesterday because of worsening lower extremity edema. He came in on 10/04 with worsening edema. The patient had a CTA done of his chest that showed no pulmonary embolism, showed a moderate right pleural effusion, increased right middle lobe partial atelectasis versus pneumonia, increased mediastinal and right hilar lymphadenopathy. The patient had a CT scan of his maxillofacial area that showed local recurrence of known tongue cancer on the right with extension from the right tongue base to the mandibular body as well as influent right level II cervical chain lymphadenopathy. The patient had an MRI done of his brain that did not show any acute infarct or other acute findings. The patient had thoracentesis done, cytology is pending on that, I would get that next day. He reports feeling fairly well this morning, tired, but otherwise, really no complaints. PAST MEDICAL HISTORY:  Significant for AFib, coronary artery disease, anal/rectal cancer as above, COPD, GERD, hypertension, sleep apnea.     CURRENT MEDICATIONS:  He is on allopurinol, nebulizers, aspirin, Bumex, carvedilol, Plavix, digoxin, folic acid, Zosyn, pravastatin, and prednisone. SOCIAL HISTORY:  He is a former smoker. He does not drink. FAMILY HISTORY:  Brother had throat cancer. Father had lung cancer. REVIEW OF SYSTEMS:  A 12-point systems done and negative except for as above. PHYSICAL EXAMINATION:  GENERAL:  Lying in bed, in no acute distress. VITAL SIGNS:  Temperature 98.0, pulse 60, blood pressure 113/70, respirations 18, saturating 98% on 4 liters nasal cannula. HEENT:  Normocephalic, atraumatic. HEME/LYMPH:  He has some right cervical lymphadenopathy. No supraclavicular or axillary lymphadenopathy appreciated. CARDIOVASCULAR:  Regular. RESPIRATORY:  No distress. ABDOMEN:  Soft, nontender. EXTREMITIES:  1+ lower extremity edema bilaterally. NEURO:  Nonfocal.    LABORATORY DATA:  White blood cell count 2.0, hemoglobin 8.4, platelets 063. Chemistry:  Sodium 139, BUN 16, creatinine 1.36, calcium 8.2, total bili 0.6, ALT 8, AST 9, alk phos 80. IMPRESSION AND PLAN:  The patient is a 65-year-old gentleman who I treated for an anal/rectal squamous cell carcinoma back in 2014. He received 5-FU and mitomycin along with radiation therapy. He had a complete response. He had no evidence of recurrence. He evidently had a tongue cancer that was removed earlier this year by Dr. Rolando Engle of [facility], N[facility] Th[facility]. I am going to ask my nurse to try to get those records. It appears that he probably has recurrence there. We are going to see what the cytology from his thoracentesis shows. If that does not give us an answer about what is going on, we need [facility] Nose [facility] Throat to see him for further evaluation. He is slightly anemic and his white counts down some. We are going to check labs including iron studies, B12, folate, copper, zinc, LDH. We will follow up on those. Overall, he is feeling better. He will continue on antibiotics for possible pneumonia and we will continue to follow along with you.         [address] [place] [address], MD [email or web address]  D:  10/06/2021 10:13  T:  10/06/2021 12:07  JOB #:  [id number]

## 2021-10-06 NOTE — PROGRESS NOTES
Problem: Mobility Impaired (Adult and Pediatric)  Goal: *Acute Goals and Plan of Care (Insert Text)  Description: FUNCTIONAL STATUS PRIOR TO ADMISSION: Pt lives alone in one story home. He is normally independent with all activity with use of a cane although does reports some difficulty donning shoes and socks. He was noted in PT session on recent admission 8/24/21 to be able to amb 150'x2 with S only, no device at that time on 2L. HOME SUPPORT PRIOR TO ADMISSION: The patient lived alone with no local support. Physical Therapy Goals  Initiated 10/5/2021  1. Patient will move from supine to sit and sit to supine , scoot up and down, and roll side to side in bed with independence within 7 day(s). 2.  Patient will transfer from bed to chair and chair to bed with modified independence using the least restrictive device within 7 day(s). 3.  Patient will perform sit to stand with modified independence within 7 day(s). 4.  Patient will ambulate with modified independence for 150 feet with the least restrictive device within 7 day(s). 5.  Patient will ascend/descend 3 stairs with handrail(s) with modified independence within 7 day(s). Outcome: Progressing Towards Goal   PHYSICAL THERAPY TREATMENT  Patient: Rich Adams (51 y.o. male)  Date: 10/6/2021  Diagnosis: CHF (congestive heart failure) ([facility] [place] [facility] [I50.9]  Tongue cancer ([facility] [place] [facility] [C02.9] <principal problem not specified>       Precautions:    Chart, physical therapy assessment, plan of care and goals were reviewed. ASSESSMENT  Patient continues with skilled PT services and is progressing towards goals. Pt presents decreased strength and endurance. Pt receive don 4LPM and o2 stats at rest at >93%. Pt performed sit to stand transfer at [facility] with cueing  for hand placement. Pt ambulated 15ft with RW at [facility]/SBA. Pt requiring cueing to stay within walker for safety. Pts o2 stats not registering with mobility but once still >90% after ambulation .  Pt with no complaints of SOB on 4LPM. Pt  with improved mobility tolerance today. Current Level of Function Impacting Discharge (mobility/balance): mobility at [facility]/SBA     Other factors to consider for discharge: decreased strength and endurance. PLAN :  Patient continues to benefit from skilled intervention to address the above impairments. Continue treatment per established plan of care. to address goals. Recommendation for discharge: (in order for the patient to meet his/her long term goals)  Therapy up to 5 days/week in SNF setting    This discharge recommendation:  Has been made in collaboration with the attending provider and/or case management    IF patient discharges home will need the following DME: rolling walker       SUBJECTIVE:   Patient stated  Thank yall for coming to see me.     OBJECTIVE DATA SUMMARY:   Critical Behavior:  Neurologic State: Alert  Orientation Level: Oriented X4  Cognition: Follows commands, Appropriate decision making  Safety/Judgement: Awareness of environment, Insight into deficits  Functional Mobility Training:  Bed Mobility:                    Transfers:  Sit to Stand: Stand-by assistance  Stand to Sit: Stand-by assistance        Bed to Chair: Stand-by assistance                    Balance:  Sitting: Intact  Standing: Impaired; With support  Standing - Static: Good;Constant support  Standing - Dynamic : Good;Constant support  Ambulation/Gait Training:  Distance (ft): 15 Feet (ft)  Assistive Device: Walker, rolling  Ambulation - Level of Assistance: Contact guard assistance;Stand-by assistance        Gait Abnormalities: Decreased step clearance (trunk flexion)        Base of Support: Widened     Speed/Shruthi: Pace decreased (<100 feet/min)  Step Length: Right shortened;Left shortened           Pain Rating:  Pt with no complaints of pain     Activity Tolerance:   Fair and requires rest breaks    After treatment patient left in no apparent distress:   Sitting in chair and Call bell within reach    COMMUNICATION/COLLABORATION:   The patients plan of care was discussed with: Occupational therapist and Registered nurse.      Abena Temple PTA   Time Calculation: 24 mins

## 2021-10-06 NOTE — PROGRESS NOTES
Problem: Falls - Risk of  Goal: *Absence of Falls  Description: Document Davina Dunham Fall Risk and appropriate interventions in the flowsheet.   Outcome: Progressing Towards Goal  Note: Fall Risk Interventions:  Mobility Interventions: Bed/chair exit alarm, Assess mobility with egress test         Medication Interventions: Bed/chair exit alarm    Elimination Interventions: Call light in reach, Bed/chair exit alarm

## 2021-10-06 NOTE — CONSULTS
IP CONSULT TO NEUROLOGY  Consult performed by: Bib Edgar MD  Consult ordered by: Jabari Jones MD            NEUROLOGY CONSULT    NAME Yadira Gandara AGE 69 y.o. MRN [id number]  1950     REQUESTING PHYSICIAN: Bharti Painter MD      CHIEF COMPLAINT: Garbled speech     This is a 69 y.o. male with a medical history of coronary artery disease who was admitted for garbled speech. He does have a history of tongue cancer status post resection but states that this current episode of garbled speech is new for him. He has no trouble with vision, swallowing or masticating his food. He denies any other symptoms of cranial nerve deficits. He has no lateralizing weakness or sensory loss. ASSESSMENT AND PLAN     1. Dysarthria  Patient has tongue deviation to the right on exam as well as right facial weakness which is concerning for supranuclear event. This also may be secondary to underlying cancer. Would get an MRI of the brain with and without contrast    2. Tongue cancer ([facility]  CT suggestive of local recurrence of known tongue cancer with extension to the mandibular body and cervical lymph nodes. 3.  Hyperlipidemia  Continue pravastatin    4. Gout  Continue allopurinol    5.   Coronary artery disease  Continue carvedilol, Plavix and aspirin           ALLERGIES:  Levaquin [levofloxacin]     Current Facility-Administered Medications   Medication Dose Route Frequency    carvediloL (COREG) tablet 3.125 mg  3.125 mg Oral BID    allopurinoL (ZYLOPRIM) tablet 100 mg  100 mg Oral DAILY    aspirin delayed-release tablet 81 mg  81 mg Oral DAILY    clopidogreL (PLAVIX) tablet 75 mg  75 mg Oral DAILY    digoxin (LANOXIN) tablet 0.125 mg  0.125 mg Oral DAILY    folic acid (FOLVITE) tablet 1 mg  1 mg Oral DAILY    [Held by provider] gabapentin (NEURONTIN) capsule 600 mg  600 mg Oral BID    pravastatin (PRAVACHOL) tablet 40 mg  40 mg Oral DAILY    ipratropium (ATROVENT) 0.02 % nebulizer solution 0.5 mg  0.5 mg Nebulization Q6H PRN    enoxaparin (LOVENOX) injection 40 mg  40 mg SubCUTAneous 7am    predniSONE (DELTASONE) tablet 40 mg  40 mg Oral DAILY WITH BREAKFAST    bumetanide (BUMEX) injection 1 mg  1 mg IntraVENous Q12H    arformoteroL (BROVANA) neb solution 15 mcg  15 mcg Nebulization BID RT    And    budesonide (PULMICORT) 500 mcg/2 ml nebulizer suspension  500 mcg Nebulization BID RT    piperacillin-tazobactam (ZOSYN) 3.375 g in 0.9% sodium chloride (MBP/ADV) 100 mL MBP  3.375 g IntraVENous Q8H    labetaloL (NORMODYNE;TRANDATE) injection 5 mg  5 mg IntraVENous Q10MIN PRN    acetaminophen (TYLENOL) tablet 650 mg  650 mg Oral Q6H PRN    ondansetron (ZOFRAN) injection 4 mg  4 mg IntraVENous Q4H PRN       Past Medical History:   Diagnosis Date    Atrial fibrillation (HCC)     CAD (coronary artery disease)     Cancer ([facility]     Colon CA 2014 - polyp removed/chemo/radiation    Chronic obstructive pulmonary disease (HCC)     GERD (gastroesophageal reflux disease)     Heart failure (HCC)     Hypertension     Sleep apnea     not using machine because of surgery on tongue       Social History     Tobacco Use    Smoking status: Former Smoker     Quit date: 3/1/2011     Years since quitting: 10.6    Smokeless tobacco: Never Used   Substance Use Topics    Alcohol use: No       Family History   Problem Relation Age of Onset    Alcohol abuse Father     Cancer Father         lung    Cancer Brother         throat    Stroke Brother      Review of Systems   Constitutional: Negative for chills and fever. HENT: Negative for ear pain. Eyes: Negative for pain and discharge. Respiratory: Negative for cough and hemoptysis. Cardiovascular: Negative for chest pain and claudication. Gastrointestinal: Negative for constipation and diarrhea. Genitourinary: Negative for flank pain and hematuria. Musculoskeletal: Negative for back pain and myalgias.    Skin: Negative for itching and rash.   Neurological: Positive for speech change. Negative for headaches. Endo/Heme/Allergies: Negative for environmental allergies. Does not bruise/bleed easily. Psychiatric/Behavioral: Negative for depression and hallucinations. Visit Vitals  /89   Pulse 66   Temp 98 °F (36.7 °C)   Resp 16   Ht 5' 9\" (1.753 m)   Wt 186 lb (84.4 kg)   SpO2 91%   BMI 27.47 kg/m²      General: Well developed, well nourished. Patient in no distress   Head: Normocephalic, atraumatic, anicteric sclera   Neck Normal ROM, No thyromegally   Lungs:  Clear to auscultation bilaterally   Cardiac: Regular rate and rhythm with no murmurs. Abd: Bowel sounds were audible. Ext: No pedal edema   Skin: Supple no rash     NeurologicExam:  Mental Status: Alert and oriented to person place and time   Speech: Fluent no aphasia or dysarthria. Cranial Nerves:  II - XII Intact with exception of right hemiparesis of the lung with right facial weakness   Motor:  Full and symmetric strength of upper and lower extremities  Normal bulk and tone. Reflexes:   Deep tendon reflexes 1+/4 and symmetric. Sensory:   Symmetric and intact with no deficits    Gait:   Deferred. Tremor:   No tremor noted. Cerebellar:  Coordination intact. Neurovascular: No carotid bruits. No JVD         REVIEWED IMAGING:    MRI :      CT:  Results from Hospital Encounter encounter on 10/04/21    CT HEAD WO CONT    Narrative  EXAM: CT HEAD WO CONT    INDICATION: Garbled/slurred speech onset 12:30 today. Evaluate for CVA/brain  mass/hemorrhage. COMPARISON: None. CONTRAST: None. TECHNIQUE: Unenhanced CT of the head was performed using 5 mm images. Brain and  bone windows were generated. Coronal and sagittal reformats. CT dose reduction  was achieved through use of a standardized protocol tailored for this  examination and automatic exposure control for dose modulation.     FINDINGS:  The ventricles and sulci are normal in size, shape and configuration. . There is  mild periventricular and subcortical white matter hypodensity. There is no  intracranial hemorrhage, extra-axial collection, or mass effect. The basilar  cisterns are open. No CT evidence of acute infarct. Atherosclerotic  calcifications affect the carotid siphons. The bone windows demonstrate no abnormalities. The visualized portions of the  paranasal sinuses and mastoid air cells are clear. Impression  Nonspecific white matter changes most likely reflective of chronic  small vessel ischemic and/or senescent change. No CT evidence of hemorrhage,  acute infarct, or mass. Intracranial atherosclerosis.       REVIEWED LABS:  Lab Results   Component Value Date/Time    WBC 2.0 (L) 10/06/2021 02:50 AM    HCT 31.2 (L) 10/06/2021 02:50 AM    HGB 8.4 (L) 10/06/2021 02:50 AM    PLATELET 594 04/92/6058 02:50 AM     Lab Results   Component Value Date/Time    Sodium 139 10/06/2021 02:50 AM    Potassium 4.3 10/06/2021 02:50 AM    Chloride 106 10/06/2021 02:50 AM    CO2 34 (H) 10/06/2021 02:50 AM    Glucose 165 (H) 10/06/2021 02:50 AM    BUN 16 10/06/2021 02:50 AM    Creatinine 1.36 (H) 10/06/2021 02:50 AM    Calcium 8.2 (L) 10/06/2021 02:50 AM       Lab Results   Component Value Date/Time    LDL, calculated 44.4 10/06/2021 02:50 AM     Lab Results   Component Value Date/Time    Hemoglobin A1c 5.8 (H) 10/06/2021 02:50 AM

## 2021-10-06 NOTE — PROGRESS NOTES
End of Shift Note    Bedside shift change report given to Igor Manning (oncoming nurse) by Augie Mata (offgoing nurse). Report included the following information SBAR, Kardex and ED Summary    Shift worked:  DAY     Shift summary and any significant changes:     Pulm consulted d/t PHTN. Hemonc consulted, labs ordered. Waiting on cytology to come back from 10/5 Thoracentesis. Continue abx. Continue to wean O2. Concerns for physician to address:       Zone phone for oncoming shift:          Activity:  Activity Level: Up with Assistance  Number times ambulated in hallways past shift: 0  Number of times OOB to chair past shift: 2    Cardiac:   Cardiac Monitoring: Yes      Cardiac Rhythm: Atrial Fib    Access:   Current line(s): PIV     Genitourinary:   Urinary status: voiding    Respiratory:   O2 Device: Nasal cannula  Chronic home O2 use?: NO  Incentive spirometer at bedside: YES     GI:     Current diet:  ADULT DIET Dysphagia - Soft & Bite Sized; No Salt Added (3-4 gm)  DIET ONE TIME MESSAGE  Passing flatus: YES  Tolerating current diet: YES       Pain Management:   Patient states pain is manageable on current regimen: N/A    Skin:  Ingacio Score: 16  Interventions: PT/OT consult    Patient Safety:  Fall Score:  Total Score: 3  Interventions: bed/chair alarm and assistive device (walker, cane, etc)  High Fall Risk: Yes    Length of Stay:  Expected LOS: 4d 0h  Actual LOS: 2      Augie Mata

## 2021-10-06 NOTE — PROGRESS NOTES
Physician Progress Note      Susu Osei  [facility] #:                  [id number]  :                       1950  ADMIT DATE:       10/4/2021 7:08 PM  [address] [place] DATE:  RESPONDING  PROVIDER #:        Phan Cavanaugh MD          QUERY TEXT:    Patient admitted with SOB,CHF . Noted documentation of Acute Kidney Injury in H&P 10/5/21. In order to support the diagnosis of EDGAR, please include additional clinical indicators in your documentation. Or please document if the diagnosis of EDGAR has been ruled out after further study. The medical record reflects the following:  Risk Factors: 70 y/o male to ED for SOB, dizziness found to have Creatinine 1.4 (baseline 1.2)  PMHx CHF, CKD III, COPD, GERD, HTN, Afib, Tongue CA, Colon CA,    Clinical Indicators: 10/5/21 H&P: EDGAR on CKD stage 3 Cr 1.4 on arrival; Baseline around 1.2    Treatment: Admit, Hospitalist consult, avoid nephrotoxins, trend creatinine    Defined by Kidney Disease Improving Global Outcomes (KDIGO) clinical practice guideline for acute kidney injury:  -Increase in SCr by greater than or equal to 0.3 mg/dl within 48 hours; or  -Increase or decrease in SCr to greater than or equal to 1.5 times baseline, which is known or presumed to have occurred within the prior 7 days; or  -Urine volume < 0.5ml/kg/h for 6 hours  Options provided:  -- Acute kidney injury evidenced by, Please document evidence as well as baseline creatinine, if known. -- Currently resolved acute kidney injury was evidenced by, Please document evidence as well as baseline creatinine, if known. -- Acute kidney injury ruled out after study  -- Other - I will add my own diagnosis  -- Disagree - Not applicable / Not valid  -- Disagree - Clinically unable to determine / Unknown  -- Refer to Clinical Documentation Reviewer    PROVIDER RESPONSE TEXT:    Acute kidney injury was ruled out after study.     Query created by: Madeline Goode on 10/6/2021 1:16 PM      Electronically signed by:  Taya Chinchilla MD 10/6/2021 3:23 PM

## 2021-10-06 NOTE — PROGRESS NOTES
1040: Asymptomatic bradycardia HR dropped in the 30s per telemetry tech. Dr. Mark Stiles made aware. Hr is currently in the upper 50's will continue to monitor. RN made aware.

## 2021-10-06 NOTE — CONSULTS
Pulmonary, Critical Care, and Sleep Medicine      Name: Polina Rodriguez MRN: [id number]   : 1950 Hospital: [facility]   Date: 10/6/2021  Admission date: 10/4/2021 Hospital Day: 3       History:   Pt is unstable and acutely ill. Medical records and data reviewed. Pt seen in Consultation        IMPRESSION:   1. Acute chronic respiratory failure with Hypoxia; has home O2 not not using it; no details  2. Severe Pulmonary Hypertension with moderately reduced RV sysytolic function on echo 10/5/21; PASP 81 mm Hg; preserved LVEF 55-60%  3. VIRAJ- no details; not using machine due to tongue deformity  4. Aspiration pneumonia POA  5. Right pleural effusion  6. Right middle lobe atelectasis vs pneumonia by chest CT  7. Mediastinal and right hilar adenopathy  8. Recurrent tongue cancer by CT head and neck   9. History Anal/ rectal Squamous cell Cancer ; 5 FU and mitomycin  10. COPD- has seen my partner dr. Henry Khan  11. Atrial fibrillation  12. CAD   15. HTN  14. Leukopenic  15. Anemia   16. PAD s/p Bilateral SFA Stents BLADIMIR Nevarez  17. Body mass index is 27.47 kg/m². 18. Prognosis guarded       RECOMMENDATIONS/PLAN:   1. Treatment options may be very limited for Pulmonary HTN  2. Agree with IV antibiotics  3. Will need follow chest imaging to follow Right middle lobe and adenopathy  4. Await plan by Oncology  5. Supplemental O2 to keep sats > 93%  6. Consider repeat sleep evaluation and trial of nasal pillows; if VIRAJ can be effectively treated, may help his PHTN   7. Will followup with Dr. Liz Berry after discharge  8. Aspiration precautions  9. Labs to follow electrolytes, renal function and and blood counts  10. Bronchial hygiene with respiratory therapy techniques, bronchodilators  11. DVT prophylaxis  12.  Prescription drug management with home med reconciliation reviewed          [x] High complexity decision making was performed  [x] See my orders for details      Subjective/Initial History:     I was asked by Ramu Lam MD to see Yadira Gandara  a 69 y.o.  male in consultation for a chief complaint of severe pulmonary hypertension    Excerpts from admission 10/4/2021 or consult notes as follows:     \"  Daija Holloway is a 69 y.o. male with past medical history significant for atrial fibrillation, tongue cancer, COPD, GERD, CHF EF 20-25% in 2014, HTN  who presents with worsening shortness of breath for the past 3 to 4 days. Patient states that he began to experience shortness of breath when he was walking and exerting himself. He denies any PND orthopnea. He also complains of worsening lower extremity swelling. He saw his nurse practitioner today who sent him to the ED for further evaluation. Patient denies any chest pain. He denies any cough. He states that he has not seen oncology for some time but was supposed to follow-up with them this week for his history of tongue cancer. He currently denies any difficulty swallowing. \"    Admitted with presumed aspiration pneumonia. Thoracentesis performed for large right pleural effusion. Looks transudative on prelim studies. Ct scan reviewed and described Inferior right paratracheal lymph node is increased and measures  2.0 x 1.9 cm. Prevascular lymph nodes are increased and measure 1.1 cm in short axis. Subcarinal lymph node is increased and measures 1.6 cm in short axis. MELINDA: Right hilar lymphadenopathy measures up to 2.3 x 1.5 cm. Upper normal left  hilar lymph nodes. No chest pain. No fever. Echo done and LVEF normal but PASP 81 mm HG. VIRAJ not treated due to tongue cancer. Has home O2 but not used. Former smoker. Right jaw larger. No hemoptysis. On Advair, spiriva. On Plavix. CVA? Has stents for legs. Lives alone. Has not needed help. Seen by dr. Honey Welch today. Note reviewed. PCP Kurt Ponce.        Allergies   Allergen Reactions    Levaquin [Levofloxacin] Hives        MAR reviewed and pertinent medications noted or modified as needed     Current Facility-Administered Medications   Medication    carvediloL (COREG) tablet 3.125 mg    allopurinoL (ZYLOPRIM) tablet 100 mg    aspirin delayed-release tablet 81 mg    clopidogreL (PLAVIX) tablet 75 mg    digoxin (LANOXIN) tablet 5.806 mg    folic acid (FOLVITE) tablet 1 mg    [Held by provider] gabapentin (NEURONTIN) capsule 600 mg    pravastatin (PRAVACHOL) tablet 40 mg    ipratropium (ATROVENT) 0.02 % nebulizer solution 0.5 mg    enoxaparin (LOVENOX) injection 40 mg    predniSONE (DELTASONE) tablet 40 mg    bumetanide (BUMEX) injection 1 mg    arformoteroL (BROVANA) neb solution 15 mcg    And    budesonide (PULMICORT) 500 mcg/2 ml nebulizer suspension    piperacillin-tazobactam (ZOSYN) 3.375 g in 0.9% sodium chloride (MBP/ADV) 100 mL MBP    labetaloL (NORMODYNE;TRANDATE) injection 5 mg    acetaminophen (TYLENOL) tablet 650 mg    ondansetron (ZOFRAN) injection 4 mg      Patient PCP: Brent Gibson MD  PMH:  has a past medical history of Atrial fibrillation ([facility] [address] CAD (coronary artery disease), Cancer ([address] Chronic obstructive pulmonary disease ([address] GERD (gastroesophageal reflux disease), Heart failure ([address] Hypertension, and Sleep apnea. He also has no past medical history of Adverse effect of anesthesia. PSH:   has a past surgical history that includes hx afib ablation; colonoscopy (N/A, 9/21/2018); colonoscopy (N/A, 1/3/2020); hx orthopaedic; hx gi; hx gi; colonoscopy (N/A, 2/7/2020); vascular surgery procedure unlist; and flexible sigmoidoscopy (Left, 5/29/2020). FHX: family history includes Alcohol abuse in his father; Kvng [employer] in his brother and father; Stroke in his brother. SHX:  reports that he quit smoking about 10 years ago. He has never used smokeless tobacco. He reports that he does not drink alcohol and does not use drugs. ROS:A comprehensive review of systems was negative except for that written in the HPI.     Objective:     Vital Signs: Telemetry: Intake/Output:   Visit Vitals  /89   Pulse 99   Temp 97.6 °F (36.4 °C)   Resp 19   Ht 5' 9\" (1.753 m)   Wt 84.4 kg (186 lb)   SpO2 94%   BMI 27.47 kg/m²       Temp (24hrs), Av.6 °F (36.4 °C), Min:97 °F (36.1 °C), Max:98 °F (36.7 °C)        O2 Device: Nasal cannula O2 Flow Rate (L/min): 4 l/min       Wt Readings from Last 4 Encounters:   10/06/21 84.4 kg (186 lb)   21 87.1 kg (192 lb)   21 89.7 kg (197 lb 12 oz)   20 95.3 kg (210 lb)          Intake/Output Summary (Last 24 hours) at 10/6/2021 175  Last data filed at 10/5/2021 2031  Gross per 24 hour   Intake    Output 400 ml   Net -400 ml       Last shift:      No intake/output data recorded. Last 3 shifts: 10/04 1901 - 10/06 0700  In: 0   Out: 80 [Urine:1100]       Physical Exam:   General:  male; in no respiratory distress and acyanotic, alert, oriented times 3 and moderately ill;  NC;  HEENT: NCAT, enlarged and firm right mandible, poor dentition, lips and mucosa dry  Eyes: anicteric; conjunctiva clear  Neck: no nodes, no accessory MM use. kyphotic  Chest: no deformity,   Cardiac: regular rate, rhythm; no murmur  Lungs: distant breath sounds; nowheezes, no rales, no rhonchi  Abd: soft, NT, hypoactive BS, OBESE,  Ext: no edema; no joint swelling;  No clubbing  : No salomon,   Neuro: fluent,  follows commands; generalized weakness  Psych- no agitation, oriented to person;   Skin: warm, dry, no cyanosis;   Pulses: 1-2+ Bilateral pedal, radial  Capillary: brisk; pale    Labs:    Recent Labs     10/06/21  0250 10/05/21  0448 10/04/21  2049   WBC 2.0* 3.1* 3.3*   HGB 8.4* 8.2* 7.3*    166 158     Recent Labs     10/06/21  0250 10/05/21  0448 10/04/21  2049    138 136   K 4.3 4.1 4.0    105 104   CO2 34* 33* 32   * 85 88   BUN 16 15 15   CREA 1.36* 1.40* 1.40*   CA 8.2* 8.4* 8.3*   MG  --  2.6*  --    ALB  --   --  3.1*   ALT  --   --  8*     No results for input(s): PH, PCO2, PO2, HCO3, FIO2 in the last 72 hours. Recent Labs     10/04/21  2049   TROIQ <0.05     Lab Results   Component Value Date/Time     (H) 01/08/2014 01:48 AM      Lab Results   Component Value Date/Time    Culture result: NO GROWTH THUS FAR 10/05/2021 02:00 PM    Culture result: NO GROWTH THUS FAR 10/05/2021 12:45 PM    Culture result: NO GROWTH 5 DAYS 08/19/2021 10:10 AM     Lab Results   Component Value Date/Time    TSH 0.73 01/07/2014 04:44 AM       Imaging:    CXR Results  (Last 48 hours)               10/05/21 1410  XR CHEST PORT Final result    Impression:  Reduced right pleural effusion with no pneumothorax. Congestion with   interstitial edema and right middle lobe consolidation redemonstrated. Narrative:  EXAM: XR CHEST PORT       INDICATION: post thoracentesis       COMPARISON: Chest x-ray 10/5/2021 and chest x-ray 10/4/2021. FINDINGS: A portable AP radiograph of the chest was obtained at 14:08 hours. The   patient is on a cardiac monitor. There is been interval reduction of previously   seen subpulmonic pleural effusion with no pneumothorax. There is no significant   change in pulmonary vascular prominence with diffuse interstitial edema and   focal area of consolidation in the right middle lobe. The cardiac and   mediastinal contours and pulmonary vascularity are normal.  The bones and soft   tissues are grossly within normal limits. 10/05/21 1103  XR CHEST PORT Final result    Impression:  Stable disease. Narrative:  EXAM: Portable CXR. 1053 hours. COMPARISON: 10/4/2021. INDICATION: resp distress, pleural effusion R sided followup       FINDINGS:   Unchanged mild pulmonary edema, moderate right pleural effusion, right middle   lobe airspace disease/atelectasis, and cardiomegaly. No pneumothorax or midline   shift. 10/04/21 2042  XR CHEST PORT Final result    Impression:  1. Persistent right middle lobe opacity.                   Narrative:  INDICATION: . dyspnea Additional history:   COMPARISON: Previous chest xray, 8/19/2021. A CT of the chest, 8/19/2021. LIMITATIONS: Portable technique. Rotation. Ade Bowen FINDINGS: Single frontal view of the chest.    .   Lines/tubes/surgical: Cardiac monitor leads overly the patient. Heart/mediastinum: Unremarkable. Lungs/pleura: Persistent opacity in the right middle lobe. No visualized pleural   effusion or pneumothorax. Additional Comments: None. .           Results from [place] encounter on 10/04/21    XR CHEST PORT    Narrative  EXAM: XR CHEST PORT    INDICATION: post thoracentesis    COMPARISON: Chest x-ray 10/5/2021 and chest x-ray 10/4/2021. FINDINGS: A portable AP radiograph of the chest was obtained at 14:08 hours. The  patient is on a cardiac monitor. There is been interval reduction of previously  seen subpulmonic pleural effusion with no pneumothorax. There is no significant  change in pulmonary vascular prominence with diffuse interstitial edema and  focal area of consolidation in the right middle lobe. The cardiac and  mediastinal contours and pulmonary vascularity are normal.  The bones and soft  tissues are grossly within normal limits. Impression  Reduced right pleural effusion with no pneumothorax. Congestion with  interstitial edema and right middle lobe consolidation redemonstrated. XR CHEST PORT    Narrative  EXAM: Portable CXR. 1053 hours. COMPARISON: 10/4/2021. INDICATION: resp distress, pleural effusion R sided followup    FINDINGS:  Unchanged mild pulmonary edema, moderate right pleural effusion, right middle  lobe airspace disease/atelectasis, and cardiomegaly. No pneumothorax or midline  shift. Impression  Stable disease. XR CHEST PORT    Narrative  INDICATION: . dyspnea  Additional history:  COMPARISON: Previous chest xray, 8/19/2021. A CT of the chest, 8/19/2021. LIMITATIONS: Portable technique. Rotation. Ade Bowen   FINDINGS: Single frontal view of the chest.  .  Lines/tubes/surgical: Cardiac monitor leads overly the patient. Heart/mediastinum: Unremarkable. Lungs/pleura: Persistent opacity in the right middle lobe. No visualized pleural  effusion or pneumothorax. Additional Comments: None. .    Impression  1. Persistent right middle lobe opacity. Results from [place] encounter on 10/04/21    CT HEAD WO CONT    Narrative  EXAM: CT HEAD WO CONT    INDICATION: Garbled/slurred speech onset 12:30 today. Evaluate for CVA/brain  mass/hemorrhage. COMPARISON: None. CONTRAST: None. TECHNIQUE: Unenhanced CT of the head was performed using 5 mm images. Brain and  bone windows were generated. Coronal and sagittal reformats. CT dose reduction  was achieved through use of a standardized protocol tailored for this  examination and automatic exposure control for dose modulation. FINDINGS:  The ventricles and sulci are normal in size, shape and configuration. . There is  mild periventricular and subcortical white matter hypodensity. There is no  intracranial hemorrhage, extra-axial collection, or mass effect. The basilar  cisterns are open. No CT evidence of acute infarct. Atherosclerotic  calcifications affect the carotid siphons. The bone windows demonstrate no abnormalities. The visualized portions of the  paranasal sinuses and mastoid air cells are clear. Impression  Nonspecific white matter changes most likely reflective of chronic  small vessel ischemic and/or senescent change. No CT evidence of hemorrhage,  acute infarct, or mass. Intracranial atherosclerosis.       This care involved high complexity medical decision making: I personally:  · Reviewed the flowsheet and previous days notes  · Reviewed and summarized records or history from previous days note or discussions with staff, family  · High Risk Drug therapy requiring intensive monitoring for toxicity: eg steroids, antibiotics  · Reviewed and/or ordered Clinical lab tests  · Reviewed images and/or ordered Radiology tests  · Reviewed the patients / Telemetry  · discussed my assessment/management with : Nursing, for coordination of care    Kaveh Gaspar MD

## 2021-10-06 NOTE — PROGRESS NOTES
Hospitalist Progress Note    NAME: aMgy Nagy   :  1950   MRN:  [id number]       Assessment / Plan:  Aspiration Pneumonia POA - RML on CXR & CTA chest ?Malignant mass  With Worsening R Pleural Effusion POA- moderate  COPD  CHF  CTA showed 2. Mild pulmonary edema and increased moderate right pleural effusion. 3. Increased right middle lobe partial atelectasis versus pneumonia. Increased  mediastinal and right hilar lymphadenopathy. Consider underlying right middle  lobe bronchogenic neoplasm.    -Cont oxygen to keep sats>90%  -Improving clinically, continue Zosyn for aspiration pneumonia  -Appreciate speech recommendation, advance diet, may need MBS in the future  -s/p right thoracentesis, 1.1 L, seems transudative, cytology pending  -Echo showed EF of 55 to 60%, but severe pulmonary hypertension, PASP 81    -Continue Bumex 1 mg IV twice daily  -Continue prednisone 40 mg p.o. daily  -Consult pulmonary for severe pulmonary hypertension      Hx of tongue cancer s/p resection, XRT and chemo  Repeat CT scan showed Findings compatible with local recurrence of known tongue cancer on the right with extension from the right tongue base to the mandibular body with there is direct bony invasion as well as confluent right level 2 cervical chain  Lymphadenopathy  IP Consult oncology- awaiting input     Garbled/Slurred speech 10/05, not POA  R/o CVA versus Brain mets  Persistent afib POA- not on full anticoagulation ? reason    C/w coreg, decreased dose today  C/w digoxin, check digoxin level    -Had garbled speech yesterday 10/05, which is resolved, CT head and MRI brain without  acute CVA  Cont ASA, plavix, lipitor  IP Neurology consult for Aphasia/R/o CVA  Stroke orderset triggered       CKD stage III  EDGAR ruled out  Cr 1.4 on arrival  Baseline around 1.2  Avoid nephrotoxins  Trend Cr     Neuropathy  C/w gabapentin     Colon cancer            Code Status: Full Code   Surrogate Decision Maker: Donnie Harris Kayla Cruz     DVT Prophylaxis: Lovenox  GI Prophylaxis: not indicated     Baseline: independent      Estimated discharge date: October 8  Barriers: none at this time    Recommended Disposition: Home w/Family and HH PT, OT, RN? TBD     Subjective:     Chief Complaint / Reason for Physician Visit: F/U R pleural effusion moderate, Tongue Cancer, aspiration PNA  Currently on 4 L nasal cannula, he says he feels okay. No slurred speech    Review of Systems:  Symptom Y/N Comments  Symptom Y/N Comments   Fever/Chills n   Chest Pain n    Poor Appetite n   Edema n    Cough y   Abdominal Pain     Sputum y   Joint Pain n    SOB/RILEY y   Pruritis/Rash     Nausea/vomit n   Tolerating PT/OT y    Diarrhea    Tolerating Diet     Constipation    Other       Could NOT obtain due to:      Objective:     VITALS:   Last 24hrs VS reviewed since prior progress note. Most recent are:  Patient Vitals for the past 24 hrs:   Temp Pulse Resp BP SpO2   10/06/21 0726 98 °F (36.7 °C) 60 18 113/70 98 %   10/06/21 0257 97.2 °F (36.2 °C) (!) 55 18 (!) 111/46 98 %   10/05/21 2244 97.6 °F (36.4 °C) 72 20 117/80 97 %   10/05/21 2156  62  (!) 120/45    10/05/21 2030 97 °F (36.1 °C) (!) 55 18 (!) 118/52 100 %   10/05/21 1958     100 %   10/05/21 1534 97.6 °F (36.4 °C) (!) 55 18 (!) 128/42 100 %   10/05/21 1430  69 18 135/68 96 %   10/05/21 1400  74 18 132/68 96 %   10/05/21 1354 98 °F (36.7 °C) 69 18 (!) 128/54 95 %   10/05/21 1216  75 18  96 %       Intake/Output Summary (Last 24 hours) at 10/6/2021 1125  Last data filed at 10/5/2021 2031  Gross per 24 hour   Intake 0 ml   Output 400 ml   Net -400 ml        I had a face to face encounter and independently examined this patient on 10/6/2021, as outlined below:  PHYSICAL EXAM:  General: WD, WN. Alert, cooperative, no acute distress    EENT:  EOMI. Anicteric sclerae. MMM  Resp:  Lungs clear bilaterally  CV:  Regular  rhythm,  No edema,  GI:  Soft, Non distended, Non tender.   +Bowel sounds  Neurologic: Alert and oriented X 3, normal speech,   Psych:   Good insight. Not anxious nor agitated  Skin:  No rashes. No jaundice    Reviewed most current lab test results and cultures  YES  Reviewed most current radiology test results   YES  Review and summation of old records today    NO  Reviewed patient's current orders and MAR    YES  PMH/SH reviewed - no change compared to H&P  ________________________________________________________________________  Care Plan discussed with:    Comments   Patient x    Family      RN     Care Manager x    Consultant                        Multidiciplinary team rounds were held today with , nursing, pharmacist and clinical coordinator. Patient's plan of care was discussed; medications were reviewed and discharge planning was addressed. ________________________________________________________________________  Total NON critical care TIME:  36   Minutes    Total CRITICAL CARE TIME Spent:   Minutes non procedure based      Comments   >50% of visit spent in counseling and coordination of care     ________________________________________________________________________  Dorrie Schlatter, MD     Procedures: see electronic medical records for all procedures/Xrays and details which were not copied into this note but were reviewed prior to creation of Plan. LABS:  I reviewed today's most current labs and imaging studies.   Pertinent labs include:  Recent Labs     10/06/21  0250 10/05/21  0448 10/04/21  2049   WBC 2.0* 3.1* 3.3*   HGB 8.4* 8.2* 7.3*   HCT 31.2* 28.5* 25.0*    166 158     Recent Labs     10/06/21  0250 10/05/21  0448 10/04/21  2049    138 136   K 4.3 4.1 4.0    105 104   CO2 34* 33* 32   * 85 88   BUN 16 15 15   CREA 1.36* 1.40* 1.40*   CA 8.2* 8.4* 8.3*   MG  --  2.6*  --    ALB  --   --  3.1*   TBILI  --   --  0.6   ALT  --   --  8*       Signed: Dorrie Schlatter, MD

## 2021-10-06 NOTE — PROGRESS NOTES
Problem: Falls - Risk of  Goal: *Absence of Falls  Description: Document Gabriele Valencia Fall Risk and appropriate interventions in the flowsheet.   Outcome: Progressing Towards Goal  Note: Fall Risk Interventions:  Mobility Interventions: Bed/chair exit alarm         Medication Interventions: Bed/chair exit alarm    Elimination Interventions: Call light in reach              Problem: Patient Education: Go to Patient Education Activity  Goal: Patient/Family Education  Outcome: Progressing Towards Goal     Problem: Patient Education: Go to Patient Education Activity  Goal: Patient/Family Education  Outcome: Progressing Towards Goal     Problem: Patient Education: Go to Patient Education Activity  Goal: Patient/Family Education  Outcome: Progressing Towards Goal     Problem: Patient Education: Go to Patient Education Activity  Goal: Patient/Family Education  Outcome: Progressing Towards Goal

## 2021-10-06 NOTE — PROGRESS NOTES
Problem: Self Care Deficits Care Plan (Adult)  Goal: *Acute Goals and Plan of Care (Insert Text)  Description: FUNCTIONAL STATUS PRIOR TO ADMISSION: ambualted with SPC at times, performed all ADLs and IADLS without assist, driving     [address]: The patient lived alone. Occupational Therapy Goals:  Initiated 10/5/2021  1. Patient will perform grooming standing with rest breaks as needed with modified independence within 7 days. 2. Patient will perform toileting with modified independence within 7 days. 3. Patient will perform upper body dressing and lower body dressing with modified independence within 7 days. 4. Patient will transfer from toilet with modified independence using the least restrictive device and appropriate durable medical equipment within 7 days. Outcome: Progressing Towards Goal    OCCUPATIONAL THERAPY TREATMENT  Patient: Magy Nagy (11 y.o. male)  Date: 10/6/2021  Diagnosis: CHF (congestive heart failure) ([facility] [I50.9]  Tongue cancer ([facility] [C02.9] <principal problem not specified>       Precautions:    Chart, occupational therapy assessment, plan of care, and goals were reviewed. ASSESSMENT  Patient continues with skilled OT services and is progressing towards goals. Pt noted with progressive mobility/balance and activity tolerance, functionally evidenced by completing sit to stand and bed to chair transfers with SBA at , with pt noted to be received on 4 liters of 02 and 02 reading during mobility/balance unable to be taken, with poor pleth noted, and 02 reading >94% seated at rest in armchair at end of session. Pt continues to benefit from skilled OT to address functional deficits during acute hospitalization, with reporting therapist believing pt will benefit from SNF rehab upon discharge. Current Level of Function Impacting Discharge (ADLs):  Mod A LE ADLs    Other factors to consider for discharge: 02 needs         PLAN :  Patient continues to benefit from skilled intervention to address the above impairments. Continue treatment per established plan of care to address goals. Recommend with staff: OOB meals, Active ADL engagement, SBA at RW for toileting    Recommend next OT session: POC progression    Recommendation for discharge: (in order for the patient to meet his/her long term goals)  Therapy up to 5 days/week in SNF setting    This discharge recommendation:  Has not yet been discussed the attending provider and/or case management    IF patient discharges home will need the following DME: TBD       SUBJECTIVE:   Patient stated I was sick yesterday but I'm feeling better today.     OBJECTIVE DATA SUMMARY:   Cognitive/Behavioral Status:  Neurologic State: Alert  Orientation Level: Oriented X4  Cognition: Follows commands; Appropriate decision making  Perception: Appears intact  Perseveration: No perseveration noted  Safety/Judgement: Awareness of environment; Insight into deficits    Functional Mobility and Transfers for ADLs:  Transfers:  Sit to Stand: Stand-by assistance     Bed to Chair: Stand-by assistance    Balance:  Sitting: Intact  Standing: Impaired; With support  Standing - Static: Good;Constant support  Standing - Dynamic : Good;Constant support    ADL Intervention:  Lower Body Dressing Assistance  Protective Undergarmet: Moderate assistance  Socks: Minimum assistance  Leg Crossed Method Used: No  Position Performed: Bending forward method    Cognitive Retraining  Safety/Judgement: Awareness of environment; Insight into deficits    Patient instructed and indicated understanding the benefits of maintaining activity tolerance, functional mobility, and independence with self care tasks during acute stay  to ensure safe return home and to baseline.  Encouraged patient to increase frequency and duration OOB, be out of bed for all meals, perform daily ADLs (as approved by RN/MD regarding bathing etc), and performing functional mobility to/from bathroom. Pt educated on safe transfer techniques, with specific emphasis on proper hand placement to push up from seated surface rather than attempt to pull self up, fully positioning self in-front of desired seated location, feeling chair on back of legs and reaching back with 1-2 UE to slowly lower self to seated position. Provided verbal cuing for education for breathing techniques including pursed lip breathing techniques (PLB) and circulatory breathing. Additionally, patient was educated on energy conservation techniques, including how they specifically apply to functional activities; This includes pacing, rest breaks, and planning. Patient with good verbal understanding however needing additional cuing through out tasks to use techniques. Additional time needed during tasks. Pain:  No c/o pain    Activity Tolerance:   Good, Fair, desaturates with exertion and requires oxygen, and requires rest breaks    After treatment patient left in no apparent distress:   Sitting in chair, Call bell within reach, and RN aware and following    COMMUNICATION/COLLABORATION:   The patients plan of care was discussed with: Physical therapy assistant and Registered nurse.      Kristen Campos OT  Time Calculation: 26 mins

## 2021-10-06 NOTE — PROGRESS NOTES
Problem: Dysphagia (Adult)  Goal: *Acute Goals and Plan of Care (Insert Text)  Description: Initiated 10/5/2021  1. Patient will tolerate soft diet, thin liquids free of sequelae of aspiration within 7 days. 2. Patient will participate in MBS within 7 days. Outcome: Progressing Towards Goal       SPEECH LANGUAGE PATHOLOGY DYSPHAGIA TREATMENT  Patient: Joey Rudd (15 y.o. male)  Date: 10/6/2021  Diagnosis: CHF (congestive heart failure) ([facility] [I50.9]  Tongue cancer ([facility] [C02.9] <principal problem not specified>       Precautions:       ASSESSMENT:  Patient with multiple medical problems this admission as well as hx of tongue resection and long hx of R sided airspace disease. During follow up today, he appears to eat without difficulty, though he has oral dysphagia from past tongue resection with new enlargement of area. During assessment he also had widely varying HR including a fib and drop to 30's noted by monitor tech (he was still in bed at that time) and increased HR when he attempted to sit on edge of bed per his request. SaO2 also varied, down to ~78 prior to PO and to low 80s later when eating. RN in room for portion of visit and watched monitor. Chronic dysphagia may play a role in his hx of airspace disease, MBS likely warranted but not needed urgenty. Will follow for medical plan including plan to tongue CA recurrence and possibly repeat later this hospital stay, also once cardiac signs more stable. PLAN:  Recommendations and Planned Interventions:  Continue soft diet  Upright for PO  Slow rate of eating. Possible MBS in near future, see above. Patient continues to benefit from skilled intervention to address the above impairments. Continue treatment per established plan of care. Discharge Recommendations: To Be Determined     SUBJECTIVE:   Patient stated Do you have some cold ginger ale.     OBJECTIVE:   Cognitive and Communication Status:  Neurologic State: Alert  Orientation Level: Oriented to situation  Cognition: Appropriate decision making  Perception: Appears intact  Perseveration: No perseveration noted  Safety/Judgement: Awareness of environment  Dysphagia Treatment:  Oral Assessment:  Oral Assessment  Labial: No impairment  Lingual:  (s/p resction with recurrance- patient reports tongue feels more swollen)  Mandible: No impairment  P.O. Trials:  Patient Position: Sat at edge of bed by choice/for comfort which may have contributed to rapid changed in HR throughout session  Vocal quality prior to P.O.: No impairment (occasionally wet)  Consistency Presented: Solid; Thin liquid;Puree;Mechanical soft        Bolus Acceptance: No impairment  Bolus Formation/Control: No impairment (mildly prolonged, aptient chews on one side)        Oral Residue: Less than 10% of bolus  Initiation of Swallow: Delayed (# of seconds)  Laryngeal Elevation: Functional  Aspiration Signs/Symptoms:  (mild wet voice throughout session, did not change in frequency after PO)                               Pain:  Pain Scale 1: Numeric (0 - 10)  Pain Intensity 1: 0       After treatment:   Patient left in no apparent distress sitting up in chair, Call bell within reach, and Nursing notified    COMMUNICATION/EDUCATION:   Patient was educated regarding his deficit(s) of dysphagia  as this relates to his diagnosis of hx of oral CA. He demonstrated Good understanding as evidenced by his responses. The patient's plan of care including recommendations, planned interventions, and recommended diet changes were discussed with: Registered nurse.      Leonie De La Fuente, SLP  Time Calculation: 20 mins

## 2021-10-07 LAB
AMMONIA PLAS-SCNC: 39 UMOL/L
ANION GAP SERPL CALC-SCNC: 1 MMOL/L (ref 5–15)
ARTERIAL PATENCY WRIST A: ABNORMAL
BASE EXCESS BLDA CALC-SCNC: 1.8 MMOL/L
BDY SITE: ABNORMAL
BUN SERPL-MCNC: 22 MG/DL (ref 6–20)
BUN/CREAT SERPL: 15 (ref 12–20)
CALCIUM SERPL-MCNC: 8.1 MG/DL (ref 8.5–10.1)
CHLORIDE SERPL-SCNC: 104 MMOL/L (ref 97–108)
CO2 SERPL-SCNC: 30 MMOL/L (ref 21–32)
CREAT SERPL-MCNC: 1.45 MG/DL (ref 0.7–1.3)
FERRITIN SERPL-MCNC: 11 NG/ML (ref 26–388)
FOLATE SERPL-MCNC: 34.4 NG/ML (ref 5–21)
GAS FLOW.O2 O2 DELIVERY SYS: 3 L/MIN
GLUCOSE SERPL-MCNC: 97 MG/DL (ref 65–100)
HCO3 BLDA-SCNC: 27 MMOL/L (ref 22–26)
MAGNESIUM SERPL-MCNC: 2.1 MG/DL (ref 1.6–2.4)
PCO2 BLDA: 43 MMHG (ref 35–45)
PH BLDA: 7.41 [PH] (ref 7.35–7.45)
PO2 BLDA: 71 MMHG (ref 80–100)
POTASSIUM SERPL-SCNC: 4.6 MMOL/L (ref 3.5–5.1)
PROCALCITONIN SERPL-MCNC: <0.05 NG/ML
SAO2 % BLD: 95 % (ref 92–97)
SAO2% DEVICE SAO2% SENSOR NAME: ABNORMAL
SODIUM SERPL-SCNC: 135 MMOL/L (ref 136–145)
SPECIMEN SITE: ABNORMAL
VIT B12 SERPL-MCNC: 297 PG/ML (ref 193–986)

## 2021-10-07 PROCEDURE — 74011250636 HC RX REV CODE- 250/636: Performed by: INTERNAL MEDICINE

## 2021-10-07 PROCEDURE — 82140 ASSAY OF AMMONIA: CPT

## 2021-10-07 PROCEDURE — 74011250637 HC RX REV CODE- 250/637: Performed by: INTERNAL MEDICINE

## 2021-10-07 PROCEDURE — 80048 BASIC METABOLIC PNL TOTAL CA: CPT

## 2021-10-07 PROCEDURE — 84145 PROCALCITONIN (PCT): CPT

## 2021-10-07 PROCEDURE — 74011636637 HC RX REV CODE- 636/637: Performed by: INTERNAL MEDICINE

## 2021-10-07 PROCEDURE — 82803 BLOOD GASES ANY COMBINATION: CPT

## 2021-10-07 PROCEDURE — 82728 ASSAY OF FERRITIN: CPT

## 2021-10-07 PROCEDURE — 77010033678 HC OXYGEN DAILY

## 2021-10-07 PROCEDURE — 74011000250 HC RX REV CODE- 250: Performed by: INTERNAL MEDICINE

## 2021-10-07 PROCEDURE — 74011000258 HC RX REV CODE- 258: Performed by: INTERNAL MEDICINE

## 2021-10-07 PROCEDURE — 36415 COLL VENOUS BLD VENIPUNCTURE: CPT

## 2021-10-07 PROCEDURE — 65660000001 HC RM ICU INTERMED STEPDOWN

## 2021-10-07 PROCEDURE — 82607 VITAMIN B-12: CPT

## 2021-10-07 PROCEDURE — 36600 WITHDRAWAL OF ARTERIAL BLOOD: CPT

## 2021-10-07 PROCEDURE — 83735 ASSAY OF MAGNESIUM: CPT

## 2021-10-07 PROCEDURE — 94640 AIRWAY INHALATION TREATMENT: CPT

## 2021-10-07 PROCEDURE — 74011250637 HC RX REV CODE- 250/637: Performed by: EMERGENCY MEDICINE

## 2021-10-07 PROCEDURE — 82746 ASSAY OF FOLIC ACID SERUM: CPT

## 2021-10-07 RX ORDER — LANOLIN ALCOHOL/MO/W.PET/CERES
1 CREAM (GRAM) TOPICAL
Status: DISCONTINUED | OUTPATIENT
Start: 2021-10-08 | End: 2021-10-07

## 2021-10-07 RX ADMIN — ALLOPURINOL 100 MG: 100 TABLET ORAL at 08:36

## 2021-10-07 RX ADMIN — CARVEDILOL 3.12 MG: 3.12 TABLET, FILM COATED ORAL at 08:36

## 2021-10-07 RX ADMIN — CLOPIDOGREL BISULFATE 75 MG: 75 TABLET ORAL at 11:00

## 2021-10-07 RX ADMIN — CARVEDILOL 3.12 MG: 3.12 TABLET, FILM COATED ORAL at 18:22

## 2021-10-07 RX ADMIN — ASPIRIN 81 MG: 81 TABLET, COATED ORAL at 08:36

## 2021-10-07 RX ADMIN — PREDNISONE 40 MG: 20 TABLET ORAL at 08:36

## 2021-10-07 RX ADMIN — PIPERACILLIN AND TAZOBACTAM 3.38 G: 3; .375 INJECTION, POWDER, LYOPHILIZED, FOR SOLUTION INTRAVENOUS at 16:24

## 2021-10-07 RX ADMIN — PIPERACILLIN AND TAZOBACTAM 3.38 G: 3; .375 INJECTION, POWDER, LYOPHILIZED, FOR SOLUTION INTRAVENOUS at 08:37

## 2021-10-07 RX ADMIN — BUMETANIDE 1 MG: 0.25 INJECTION, SOLUTION INTRAMUSCULAR; INTRAVENOUS at 20:30

## 2021-10-07 RX ADMIN — ENOXAPARIN SODIUM 40 MG: 40 INJECTION SUBCUTANEOUS at 08:36

## 2021-10-07 RX ADMIN — ACETAMINOPHEN 650 MG: 325 TABLET ORAL at 05:24

## 2021-10-07 RX ADMIN — ARFORMOTEROL TARTRATE 15 MCG: 15 SOLUTION RESPIRATORY (INHALATION) at 19:53

## 2021-10-07 RX ADMIN — FOLIC ACID 1 MG: 1 TABLET ORAL at 08:36

## 2021-10-07 RX ADMIN — BUDESONIDE 500 MCG: 0.5 INHALANT RESPIRATORY (INHALATION) at 09:00

## 2021-10-07 RX ADMIN — ARFORMOTEROL TARTRATE 15 MCG: 15 SOLUTION RESPIRATORY (INHALATION) at 08:11

## 2021-10-07 RX ADMIN — IRON SUCROSE 200 MG: 20 INJECTION, SOLUTION INTRAVENOUS at 11:00

## 2021-10-07 RX ADMIN — PRAVASTATIN SODIUM 40 MG: 40 TABLET ORAL at 08:36

## 2021-10-07 RX ADMIN — BUDESONIDE 500 MCG: 0.5 INHALANT RESPIRATORY (INHALATION) at 19:53

## 2021-10-07 RX ADMIN — BUMETANIDE 1 MG: 0.25 INJECTION, SOLUTION INTRAMUSCULAR; INTRAVENOUS at 08:36

## 2021-10-07 RX ADMIN — DIGOXIN 0.12 MG: 125 TABLET ORAL at 08:36

## 2021-10-07 NOTE — CONSULTS
Pulmonary, Critical Care, and Sleep Medicine      Name: Devendra Rader MRN: [id number]   : 1950 Hospital: [facility]   Date: 10/7/2021  Admission date: 10/4/2021 Hospital Day: 4       History:     10/7/2021 : Pt is acutely ill. Medical records and data reviewed. Office notes reviewed, Saw my partner a year ago. VIRAJ untreated. Still has CPAP at home. Leg edema. Pt admits to eating too much salt. Orders out because it is easier. Explained pathophysiology of PHTN, RV failure. 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 CTA without PE. Called AdamJanice louie Child [phone]  Discussed above and pt management. She noted pt hypersomnolent. Asked that we check ammonia level. Has been high on past. Pt used to drink. IMPRESSION:   1. Acute chronic respiratory failure with Hypoxia; has home O2 not not using it; no details  2. Severe Pulmonary Hypertension with moderately reduced RV systolic function on echo 10/5/21; PASP 81 mm Hg; preserved LVEF 55-60%  3. Hypersomnolence- metabolic or sleep deprivation? 4. VIRAJ- no details; not using machine due to tongue deformity  5. Aspiration pneumonia POA  6. Right pleural effusion; s/p thoracentesis 10/5/21 1100ml  7. Right middle lobe atelectasis vs pneumonia by chest CT  8. Mediastinal and right hilar adenopathy  9. Recurrent tongue cancer by CT head and neck   10. History Anal/ rectal Squamous cell Cancer ; 5 FU and mitomycin  11. COPD- has seen my partner dr. Oh Patterson  12. Atrial fibrillation  13. CAD   15. HTN  15. Leukopenic  16. Anemia   17. PAD s/p Bilateral SFA Stents Dr. Jean Pena, S  18. H/o ETOH  Body mass index is 27.47 kg/m². 19. Prognosis guarded       RECOMMENDATIONS/PLAN:   1. Treatment options may be very limited for Pulmonary HTN  2. ABG, ammonia level  3. Agree with IV antibiotics  4. Continue diuresis  5. Will need follow chest imaging to follow Right middle lobe and adenopathy  6.  Await plan by Oncology  7. Supplemental O2 to keep sats > 93%  8. Will followup with Dr. Jose Queen after discharge to arrange for PFTs and repeat sleep evaluation; if VIRAJ can be effectively treated, may help his PHTN   9. Dietary counseling done  10. Aspiration precautions  11. Labs to follow electrolytes, renal function and and blood counts  12. Bronchial hygiene with respiratory therapy techniques, bronchodilators  13. DVT prophylaxis  14. Prescription drug management with home med reconciliation reviewed          [x] High complexity decision making was performed  [x] See my orders for details      Subjective/Initial History:     I was asked by Erica Pettit MD to see Astrid Mott  a 69 y.o.  male in consultation for a chief complaint of severe pulmonary hypertension    Excerpts from admission 10/4/2021 or consult notes as follows:     \"  Zenaida Briceno is a 69 y.o. male with past medical history significant for atrial fibrillation, tongue cancer, COPD, GERD, CHF EF 20-25% in 2014, HTN  who presents with worsening shortness of breath for the past 3 to 4 days. Patient states that he began to experience shortness of breath when he was walking and exerting himself. He denies any PND orthopnea. He also complains of worsening lower extremity swelling. He saw his nurse practitioner today who sent him to the ED for further evaluation. Patient denies any chest pain. He denies any cough. He states that he has not seen oncology for some time but was supposed to follow-up with them this week for his history of tongue cancer. He currently denies any difficulty swallowing. \"    Admitted with presumed aspiration pneumonia. Thoracentesis performed for large right pleural effusion. Looks transudative on prelim studies. Ct scan reviewed and described Inferior right paratracheal lymph node is increased and measures  2.0 x 1.9 cm. Prevascular lymph nodes are increased and measure 1.1 cm in short axis.  Subcarinal lymph node is increased and measures 1.6 cm in short axis. MELINDA: Right hilar lymphadenopathy measures up to 2.3 x 1.5 cm. Upper normal left  hilar lymph nodes. No chest pain. No fever. Echo done and LVEF normal but PASP 81 mm HG. VIRAJ not treated due to tongue cancer. Has home O2 but not used. Former smoker. Right jaw larger. No hemoptysis. On Advair, spiriva. On Plavix. CVA? Has stents for legs. Lives alone. Has not needed help. Seen by dr. Candido Cushing today. Note reviewed. PCP Ashby Severance. Allergies   Allergen Reactions    Levaquin [Levofloxacin] Hives        MAR reviewed and pertinent medications noted or modified as needed     Current Facility-Administered Medications   Medication    carvediloL (COREG) tablet 3.125 mg    allopurinoL (ZYLOPRIM) tablet 100 mg    aspirin delayed-release tablet 81 mg    clopidogreL (PLAVIX) tablet 75 mg    digoxin (LANOXIN) tablet 1.345 mg    folic acid (FOLVITE) tablet 1 mg    [Held by provider] gabapentin (NEURONTIN) capsule 600 mg    pravastatin (PRAVACHOL) tablet 40 mg    ipratropium (ATROVENT) 0.02 % nebulizer solution 0.5 mg    enoxaparin (LOVENOX) injection 40 mg    predniSONE (DELTASONE) tablet 40 mg    bumetanide (BUMEX) injection 1 mg    arformoteroL (BROVANA) neb solution 15 mcg    And    budesonide (PULMICORT) 500 mcg/2 ml nebulizer suspension    piperacillin-tazobactam (ZOSYN) 3.375 g in 0.9% sodium chloride (MBP/ADV) 100 mL MBP    labetaloL (NORMODYNE;TRANDATE) injection 5 mg    acetaminophen (TYLENOL) tablet 650 mg    ondansetron (ZOFRAN) injection 4 mg      Patient PCP: Brigida Dill MD  PMH:  has a past medical history of Atrial fibrillation ([address] CAD (coronary artery disease), Cancer ([address] Chronic obstructive pulmonary disease ([address] GERD (gastroesophageal reflux disease), Heart failure ([address] Hypertension, and Sleep apnea. He also has no past medical history of Adverse effect of anesthesia.   PSH:   has a past surgical history that includes hx afib ablation; colonoscopy (N/A, 2018); colonoscopy (N/A, 1/3/2020); hx orthopaedic; hx gi; hx gi; colonoscopy (N/A, 2020); vascular surgery procedure unlist; and flexible sigmoidoscopy (Left, 2020). FHX: family history includes Alcohol abuse in his father; Kristin Mace in his brother and father; Stroke in his brother. SHX:  reports that he quit smoking about 10 years ago. He has never used smokeless tobacco. He reports that he does not drink alcohol and does not use drugs. ROS:A comprehensive review of systems was negative except for that written in the HPI. Objective:     Vital Signs: Telemetry:     Intake/Output:   Visit Vitals  BP (!) 122/57   Pulse 67   Temp 98.1 °F (36.7 °C)   Resp 16   Ht 5' 9\" (1.753 m)   Wt 84.4 kg (186 lb)   SpO2 96%   BMI 27.47 kg/m²       Temp (24hrs), Av.1 °F (36.7 °C), Min:97.6 °F (36.4 °C), Max:98.7 °F (37.1 °C)        O2 Device: Nasal cannula O2 Flow Rate (L/min): 4 l/min       Wt Readings from Last 4 Encounters:   10/06/21 84.4 kg (186 lb)   21 87.1 kg (192 lb)   21 89.7 kg (197 lb 12 oz)   20 95.3 kg (210 lb)          Intake/Output Summary (Last 24 hours) at 10/7/2021 1312  Last data filed at 10/7/2021 1132  Gross per 24 hour   Intake 980 ml   Output 520 ml   Net 460 ml       Last shift:      10/07 0701 - 10/07 1900  In: 480 [P.O.:480]  Out: 320 [Urine:320]  Last 3 shifts: 10/05 1901 - 10/07 07  In: 500 [I.V.:500]  Out: 600 [Urine:600]       Physical Exam:   General:  male; in no respiratory distress and acyanotic, alert, oriented times 3 and moderately ill;  NC;  HEENT: NCAT, enlarged and firm right mandible, poor dentition, lips and mucosa dry  Eyes: anicteric; conjunctiva clear  Neck: no nodes, no accessory MM use. kyphotic  Chest: no deformity,   Cardiac: regular rate, rhythm; no murmur  Lungs: distant breath sounds; nowheezes, no rales, no rhonchi  Abd: soft, NT, hypoactive BS, OBESE,  Ext: no edema; no joint swelling;  No clubbing  : No salomon,   Neuro: fluent,  follows commands; generalized weakness  Psych- no agitation, oriented to person;   Skin: warm, dry, no cyanosis;   Pulses: 1-2+ Bilateral pedal, radial  Capillary: brisk; pale    Labs:    Recent Labs     10/06/21  0250 10/05/21  0448 10/04/21  2049   WBC 2.0* 3.1* 3.3*   HGB 8.4* 8.2* 7.3*    166 158     Recent Labs     10/07/21  0358 10/06/21  0250 10/05/21  0448 10/04/21  2049 10/04/21  2049   * 139 138   < > 136   K 4.6 4.3 4.1   < > 4.0    106 105   < > 104   CO2 30 34* 33*   < > 32   GLU 97 165* 85   < > 88   BUN 22* 16 15   < > 15   CREA 1.45* 1.36* 1.40*   < > 1.40*   CA 8.1* 8.2* 8.4*   < > 8.3*   MG  --   --  2.6*  --   --    ALB  --   --   --   --  3.1*   ALT  --   --   --   --  8*    < > = values in this interval not displayed. No results for input(s): PH, PCO2, PO2, HCO3, FIO2 in the last 72 hours. Recent Labs     10/04/21  2049   TROIQ <0.05     Lab Results   Component Value Date/Time     (H) 01/08/2014 01:48 AM      Lab Results   Component Value Date/Time    Culture result: NO GROWTH THUS FAR 10/05/2021 02:00 PM    Culture result: NO GROWTH THUS FAR 10/05/2021 12:45 PM    Culture result: NO GROWTH 5 DAYS 08/19/2021 10:10 AM     Lab Results   Component Value Date/Time    TSH 0.73 01/07/2014 04:44 AM       Imaging:    CXR Results  (Last 48 hours)               10/05/21 1410  XR CHEST PORT Final result    Impression:  Reduced right pleural effusion with no pneumothorax. Congestion with   interstitial edema and right middle lobe consolidation redemonstrated. Narrative:  EXAM: XR CHEST PORT       INDICATION: post thoracentesis       COMPARISON: Chest x-ray 10/5/2021 and chest x-ray 10/4/2021. FINDINGS: A portable AP radiograph of the chest was obtained at 14:08 hours. The   patient is on a cardiac monitor. There is been interval reduction of previously   seen subpulmonic pleural effusion with no pneumothorax.  There is no significant   change in pulmonary vascular prominence with diffuse interstitial edema and   focal area of consolidation in the right middle lobe. The cardiac and   mediastinal contours and pulmonary vascularity are normal.  The bones and soft   tissues are grossly within normal limits. Results from [place] encounter on 10/04/21    XR CHEST PORT    Narrative  EXAM: XR CHEST PORT    INDICATION: post thoracentesis    COMPARISON: Chest x-ray 10/5/2021 and chest x-ray 10/4/2021. FINDINGS: A portable AP radiograph of the chest was obtained at 14:08 hours. The  patient is on a cardiac monitor. There is been interval reduction of previously  seen subpulmonic pleural effusion with no pneumothorax. There is no significant  change in pulmonary vascular prominence with diffuse interstitial edema and  focal area of consolidation in the right middle lobe. The cardiac and  mediastinal contours and pulmonary vascularity are normal.  The bones and soft  tissues are grossly within normal limits. Impression  Reduced right pleural effusion with no pneumothorax. Congestion with  interstitial edema and right middle lobe consolidation redemonstrated. XR CHEST PORT    Narrative  EXAM: Portable CXR. 1053 hours. COMPARISON: 10/4/2021. INDICATION: resp distress, pleural effusion R sided followup    FINDINGS:  Unchanged mild pulmonary edema, moderate right pleural effusion, right middle  lobe airspace disease/atelectasis, and cardiomegaly. No pneumothorax or midline  shift. Impression  Stable disease. XR CHEST PORT    Narrative  INDICATION: . dyspnea  Additional history:  COMPARISON: Previous chest xray, 8/19/2021. A CT of the chest, 8/19/2021. LIMITATIONS: Portable technique. Rotation. [place] FINDINGS: Single frontal view of the chest.  .  Lines/tubes/surgical: Cardiac monitor leads overly the patient. Heart/mediastinum: Unremarkable. Lungs/pleura: Persistent opacity in the right middle lobe. No visualized pleural  effusion or pneumothorax. Additional Comments: None. .    Impression  1. Persistent right middle lobe opacity. Results from [place] encounter on 10/04/21    CT HEAD WO CONT    Narrative  EXAM: CT HEAD WO CONT    INDICATION: Garbled/slurred speech onset 12:30 today. Evaluate for CVA/brain  mass/hemorrhage. COMPARISON: None. CONTRAST: None. TECHNIQUE: Unenhanced CT of the head was performed using 5 mm images. Brain and  bone windows were generated. Coronal and sagittal reformats. CT dose reduction  was achieved through use of a standardized protocol tailored for this  examination and automatic exposure control for dose modulation. FINDINGS:  The ventricles and sulci are normal in size, shape and configuration. . There is  mild periventricular and subcortical white matter hypodensity. There is no  intracranial hemorrhage, extra-axial collection, or mass effect. The basilar  cisterns are open. No CT evidence of acute infarct. Atherosclerotic  calcifications affect the carotid siphons. The bone windows demonstrate no abnormalities. The visualized portions of the  paranasal sinuses and mastoid air cells are clear. Impression  Nonspecific white matter changes most likely reflective of chronic  small vessel ischemic and/or senescent change. No CT evidence of hemorrhage,  acute infarct, or mass. Intracranial atherosclerosis.       This care involved high complexity medical decision making: I personally:  · Reviewed the flowsheet and previous days notes  · Reviewed and summarized records or history from previous days note or discussions with staff, family  · High Risk Drug therapy requiring intensive monitoring for toxicity: eg steroids, antibiotics  · Reviewed and/or ordered Clinical lab tests  · Reviewed images and/or ordered Radiology tests  · Reviewed the patients / Telemetry  · discussed my assessment/management with : Nursing, for coordination of care    Robert JC Tanya Carr MD

## 2021-10-07 NOTE — PROGRESS NOTES
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 Case discussed with following: .     Patient complains of the following: No complaints    Additional concerns noted by the staff:     Patient Vitals for the past 24 hrs:   BP Temp Pulse Resp SpO2   10/07/21 0814     95 %   10/07/21 0803 123/64 98.4 °F (36.9 °C) 66 15 97 %   10/07/21 0319 107/66 98.7 °F (37.1 °C) 61 17 96 %   10/06/21 2252 121/65 97.7 °F (36.5 °C) (!) 58 14 95 %   10/06/21 1953     94 %   10/06/21 1920 (!) 101/52 97.9 °F (36.6 °C) 71 15 (!) 85 %   10/06/21 1609 116/89 97.6 °F (36.4 °C) 99 19 94 %   10/06/21 1518 116/89 98 °F (36.7 °C) 66 16 91 %   10/06/21 1201 (!) 119/47 98 °F (36.7 °C) 60 18 98 %       Review of Systems:  12 point ROS done and negative except as above    Physical Examination:  Gen NAD  Resp no distress  Psych normal    Labs:  Recent Results (from the past 24 hour(s))   DIGOXIN    Collection Time: 10/06/21 11:33 AM   Result Value Ref Range    Digoxin level 1.8 0.90 - 2.00 NG/ML   IRON PROFILE    Collection Time: 10/06/21 11:33 AM   Result Value Ref Range    Iron 15 (L) 35 - 150 ug/dL    TIBC 426 250 - 450 ug/dL    Iron % saturation 4 (L) 20 - 50 %   LD    Collection Time: 10/06/21 11:33 AM   Result Value Ref Range     85 - 341 U/L   METABOLIC PANEL, BASIC    Collection Time: 10/07/21  3:58 AM   Result Value Ref Range    Sodium 135 (L) 136 - 145 mmol/L    Potassium 4.6 3.5 - 5.1 mmol/L    Chloride 104 97 - 108 mmol/L    CO2 30 21 - 32 mmol/L    Anion gap 1 (L) 5 - 15 mmol/L    Glucose 97 65 - 100 mg/dL    BUN 22 (H) 6 - 20 MG/DL    Creatinine 1.45 (H) 0.70 - 1.30 MG/DL    BUN/Creatinine ratio 15 12 - 20      GFR est AA 58 (L) >60 ml/min/1.73m2    GFR est non-AA 48 (L) >60 ml/min/1.73m2    Calcium 8.1 (L) 8.5 - 10.1 MG/DL   FERRITIN    Collection Time: 10/07/21  5:15 AM   Result Value Ref Range    Ferritin 11 (L) 26 - 388 NG/ML   FOLATE Collection Time: 10/07/21  5:15 AM   Result Value Ref Range    Folate 34.4 (H) 5.0 - 21.0 ng/mL   VITAMIN B12    Collection Time: 10/07/21  5:15 AM   Result Value Ref Range    Vitamin B12 297 193 - 986 pg/mL       Assessment and Plan:   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

## 2021-10-07 NOTE — PROGRESS NOTES
1500 Bedside (SBAR) Shift report received from Tye Ramey RN.  1900 Bedside (SBAR) Shift report given to Rosita Kennedy RN.

## 2021-10-07 NOTE — PROGRESS NOTES
Physician Progress Note      Kike Beebe  [facility] #:                  [id number]  :                       1950  ADMIT DATE:       10/4/2021 7:08 PM  [address] DATE:  RESPONDING  PROVIDER #:        Loretta Boss MD          QUERY TEXT:    Pt admitted with SOB. Has CHF documented. If possible, please document in progress notes and discharge summary further specificity regarding the type and acuity of CHF:    The medical record reflects the following:  Risk Factors: 70 y/o male to ED for SOB, noted CHF, Pleural effusion via CTA, receiving Bumex, Pro BNP 10,504  PMHx CHF, CKD III, COPD, GERD, HTN, Afib, Tongue CA, Colon CA,    Clinical Indicators:  10/5/21 H&P: noted CHF on MD provider progress notes. 10/4/21 ED Provider PN: Admit diagnosis: Clinical Impression:  1. Acute on chronic systolic congestive heart failure    10/4/21 CTA showed 2. Mild pulmonary edema and increased moderate right pleural effusion. Labs: 10/4/2021 20:49 NT pro-BNP: 10,504 (H)    10/5/21 CXR FINDINGS:  Unchanged mild pulmonary edema, moderate right pleural effusion, right middle lobe airspace disease/atelectasis, and cardiomegaly. No pneumothorax or midline shift. 10/5/21 Echo: Final result  LV: Estimated LVEF is 55 - 60%. Normal cavity size, systolic function (ejection fraction normal) and diastolic function. Mild concentric hypertrophy.       Treatment: Admit to telemetry, Hospitalist consult, CXR, CTA, Thoracentesis, Labs pro BNP, Echo, Meds Bumex IV 1mg BID, I/O, Daily weights, Vitals per unit, fluid restriction 1200ml  Options provided:  -- Acute on Chronic Systolic CHF/HFrEF  -- Acute on Chronic Diastolic CHF/HFpEF  -- Acute Diastolic CHF/HFpEF  -- Chronic Diastolic CHF/HFpEF  -- Other - I will add my own diagnosis  -- Disagree - Not applicable / Not valid  -- Disagree - Clinically unable to determine / Unknown  -- Refer to Clinical Documentation Reviewer    PROVIDER RESPONSE TEXT:    Provider disagreed with this query. Query created by: Norman Thakkar on 10/7/2021 7:58 AM      QUERY TEXT:    Patient admitted with SOB, CHF, noted to have Pleural effusion with history of malignancy. If possible, please document in progress notes and d/c summary further specificity regarding the type/underlying cause of pleural effusion: The medical record reflects the following:  Risk Factors: 68 y/o male to ED for SOB, noted CHF, Pleural effusion requiring thoracentesis, hematology consult- possible recurrence tongue cancer. PMHx CHF, CKD III, COPD, GERD, HTN, Afib, Tongue CA, Colon CA,    Clinical Indicators:  10/5/21 MD PN: Worsening R Pleural Effusion POA- moderate  CTA showed? 2. Mild pulmonary edema and increased moderate right pleural effusion. 3. Increased right middle lobe partial atelectasis versus pneumonia. Increased  mediastinal and right hilar lymphadenopathy. Consider underlying right middle  lobe bronchogenic neoplasm. ? Hx of tongue cancer s/p resection, XRT and chemo  Repeat CT scan showed? Findings compatible with local recurrence of known tongue cancer on the right with extension from the right tongue base to the mandibular body with there is direct bony invasion as well as confluent right level 2 cervical chain  Lymphadenopathy    10/6/21 Hematology Consult PN:  \"He evidently had a tongue cancer that was removed earlier this year by Dr. Satya Tejeda of [facility], [facility]ose [facility] Throat. I am going to ask my nurse to try to get those records. It appears that he probably has recurrence there. We are going to see what the cytology from his thoracentesis shows\".     10/5 Thoracentesis Rt  IMPRESSION  Technically successful ultrasound guided right thoracentesis with evacuation of 1100 ml of fluid      Treatment: Admit to telemetry, Hospitalist consult, CXR, CTA, Thoracentesis, Labs pro BNP, Echo, Meds Bumex IV 1mg BID, I/O, Daily weights, Vitals per unit  Options provided:  -- Pleural effusion due to CHF  -- Malignant pleural effusion due to recurrence tongue cancer  -- Other - I will add my own diagnosis  -- Disagree - Not applicable / Not valid  -- Disagree - Clinically unable to determine / Unknown  -- Refer to Clinical Documentation Reviewer    PROVIDER RESPONSE TEXT:    Provider is clinically unable to determine a response to this query.     Query created by: Norman Thakkar on 10/7/2021 8:14 AM      Electronically signed by:  Arielle Duvall MD 10/7/2021 3:25 PM

## 2021-10-07 NOTE — PROGRESS NOTES
Hospitalist Progress Note    NAME: Jerad Ramirez   :  1950   MRN:  [id number]       Assessment / Plan:  Aspiration Pneumonia POA - RML on CXR & CTA chest ?Malignant mass  With Worsening R Pleural Effusion POA- moderate  COPD  CHF  CTA showed 2. Mild pulmonary edema and increased moderate right pleural effusion. 3. Increased right middle lobe partial atelectasis versus pneumonia. Increased  mediastinal and right hilar lymphadenopathy. Consider underlying right middle  lobe bronchogenic neoplasm.    -Cont oxygen to keep sats>90%, currently on 3 L. Lower than yesterday supplements  -Improving clinically, continue Zosyn for aspiration pneumonia, send procalcitonin and downgrade to Augmentin since more clinical improvement is evident  -Appreciate speech recommendation, advance diet, may need MBS in the future  -s/p right thoracentesis, 1.1 L, seems transudative, cytology pending  -Echo showed EF of 55 to 60%, but severe pulmonary hypertension, PASP 81    -Continue Bumex 1 mg IV twice daily  -Continue prednisone 40 mg p.o. daily  -Pulmonology input is appreciated      Hx of tongue cancer s/p resection, XRT and chemo  Repeat CT scan showed Findings compatible with local recurrence of known tongue cancer on the right with extension from the right tongue base to the mandibular body with there is direct bony invasion as well as confluent right level 2 cervical chain  Lymphadenopathy  Oncology input is appreciated     Garbled/Slurred speech 10/05, not POA  R/o CVA versus Brain mets  Persistent afib POA- not on full anticoagulation ? reason    C/w coreg, decreased dose today  C/w digoxin, check digoxin level    -Had garbled speech 10/05 which is resolved, CT head and MRI brain without  acute CVA  Cont ASA, plavix, lipitor  Neurology input is appreciated  Stroke orderset triggered    Iron deficiency anemia  Started on IV iron  Hemodynamically stable  No indication for transfusion at this point       CKD stage III  EDGAR ruled out  Cr 1.4 on arrival  Baseline around 1.2  Avoid nephrotoxins  Trend Cr     Neuropathy  C/w gabapentin     Colon cancer 2014           Code Status: Full Code   Surrogate Decision Maker: Maldonado Issa     DVT Prophylaxis: Lovenox  GI Prophylaxis: not indicated     Baseline: independent      Estimated discharge date: October 8  Barriers: none at this time    Recommended Disposition: Home w/Family and HH PT, OT, RN? TBD     Subjective:   Patient was seen and examined. No acute events overnight. Discussed with RN overnight events. All patient's questions were answered. \"feeling ok\"    Review of Systems:  Symptom Y/N Comments  Symptom Y/N Comments   Fever/Chills n   Chest Pain n    Poor Appetite    Edema     Cough y   Abdominal Pain n    Sputum y   Joint Pain     SOB/RILEY t   Pruritis/Rash     Nausea/vomit n   Tolerating PT/OT     Diarrhea    Tolerating Diet y    Constipation    Other       Could NOT obtain due to:          Objective:     VITALS:   Last 24hrs VS reviewed since prior progress note. Most recent are:  Patient Vitals for the past 24 hrs:   Temp Pulse Resp BP SpO2   10/07/21 1055 98.1 °F (36.7 °C) 67 16 (!) 122/57 96 %   10/07/21 0814     95 %   10/07/21 0803 98.4 °F (36.9 °C) 66 15 123/64 97 %   10/07/21 0319 98.7 °F (37.1 °C) 61 17 107/66 96 %   10/06/21 2252 97.7 °F (36.5 °C) (!) 58 14 121/65 95 %   10/06/21 1953     94 %   10/06/21 1920 97.9 °F (36.6 °C) 71 15 (!) 101/52 (!) 85 %   10/06/21 1609 97.6 °F (36.4 °C) 99 19 116/89 94 %   10/06/21 1518 98 °F (36.7 °C) 66 16 116/89 91 %       Intake/Output Summary (Last 24 hours) at 10/7/2021 1349  Last data filed at 10/7/2021 1132  Gross per 24 hour   Intake 980 ml   Output 520 ml   Net 460 ml        I had a face to face encounter and independently examined this patient on 10/7/2021, as outlined below:  PHYSICAL EXAM:  General: WD, WN. Alert, cooperative, no acute distress    EENT:  EOMI. Anicteric sclerae.  MMM  Resp:  Lungs clear bilaterally  CV:  Regular  rhythm,  No edema,  GI:  Soft, Non distended, Non tender. +Bowel sounds  Neurologic:  Alert and oriented X 3, normal speech,   Psych:   Good insight. Not anxious nor agitated  Skin:  No rashes. No jaundice    Reviewed most current lab test results and cultures  YES  Reviewed most current radiology test results   YES  Review and summation of old records today    NO  Reviewed patient's current orders and MAR    YES  PMH/SH reviewed - no change compared to H&P  ________________________________________________________________________  Care Plan discussed with:    Comments   Patient x    Family      RN     Care Manager x    Consultant                        Multidiciplinary team rounds were held today with , nursing, pharmacist and clinical coordinator. Patient's plan of care was discussed; medications were reviewed and discharge planning was addressed. ________________________________________________________________________  Total NON critical care TIME:  36   Minutes    Total CRITICAL CARE TIME Spent:   Minutes non procedure based      Comments   >50% of visit spent in counseling and coordination of care     ________________________________________________________________________  Shy Cat MD     Procedures: see electronic medical records for all procedures/Xrays and details which were not copied into this note but were reviewed prior to creation of Plan. LABS:  I reviewed today's most current labs and imaging studies.   Pertinent labs include:  Recent Labs     10/06/21  0250 10/05/21  0448 10/04/21  2049   WBC 2.0* 3.1* 3.3*   HGB 8.4* 8.2* 7.3*   HCT 31.2* 28.5* 25.0*    166 158     Recent Labs     10/07/21  0358 10/06/21  0250 10/05/21  0448 10/04/21  2049 10/04/21  2049   * 139 138   < > 136   K 4.6 4.3 4.1   < > 4.0    106 105   < > 104   CO2 30 34* 33*   < > 32   GLU 97 165* 85   < > 88   BUN 22* 16 15   < > 15   CREA 1.45* 1.36* 1.40*   < > 1.40*   CA 8.1* 8.2* 8.4*   < > 8.3*   MG 2.1  --  2.6*  --   --    ALB  --   --   --   --  3.1*   TBILI  --   --   --   --  0.6   ALT  --   --   --   --  8*    < > = values in this interval not displayed.        Signed: Sabrina Espinoza MD

## 2021-10-07 NOTE — PROGRESS NOTES
Transition of Care Plan:     RUR: 25%  Disposition: Home with Outpatient service (PT)  Follow up appointments:PCP; Cardiologist  DME needed: None  Transportation at Discharge: Pt's daughter Carina Coombs will transport at d/c.   [facility] or means to access home:  Pt has keys       IM Medicare Letter: 2nd IM Letter needed  Is patient a BCPI-A Bundle:  N/A                    If yes, was Bundle Letter given?:  N/A   Caregiver Contact:Janice Allred- Daughter [phone]  Discharge Caregiver contacted prior to discharge? CM will notify caregiver at d/c.     9:45am- CM met with pt about bedside to discuss d/c plan. CM discussed with pt about PT/OT recommendations of SNF. Pt declined SNF. CM discussed with pt about HH. Pt declined HH. Pt stated that he would like to do OP PT.     9:00am- CM called pt's daughter to discuss d/c plan. CM informed daughter about PT/OT recommendations of SNF. Pt's daughter stated that pt would most likely not want to go to SNF. Pt's daughter inquired about outpatient PT. CM informed pt's daughter that CM will discuss d/c plan with pt. CM will continue to follow patient for discharge planning needs and arrange for services as deemed necessary.     Lenin Gamez [address]  [phone]

## 2021-10-07 NOTE — PROGRESS NOTES
Bedside and Verbal shift change report received from [facility]. Report included the following information SBAR, Kardex and Recent Results.

## 2021-10-07 NOTE — ROUTINE PROCESS
Telemetry notified me that patient had 6 beats VT. Vital signs stable. Patient sitting on side of bed without complaints. Notified Dr. Sheila Moore and he added on a magnesium to am labs.

## 2021-10-08 LAB
BASOPHILS # BLD: 0 K/UL (ref 0–0.1)
BASOPHILS NFR BLD: 0 % (ref 0–1)
BLASTS NFR BLD MANUAL: 0 %
DIFFERENTIAL METHOD BLD: ABNORMAL
EOSINOPHIL # BLD: 0 K/UL (ref 0–0.4)
EOSINOPHIL NFR BLD: 0 % (ref 0–7)
ERYTHROCYTE [DISTWIDTH] IN BLOOD BY AUTOMATED COUNT: 20.6 % (ref 11.5–14.5)
HCT VFR BLD AUTO: 26.7 % (ref 36.6–50.3)
HGB BLD-MCNC: 7.5 G/DL (ref 12.1–17)
IMM GRANULOCYTES # BLD AUTO: 0 K/UL
IMM GRANULOCYTES NFR BLD AUTO: 0 %
LYMPHOCYTES # BLD: 0.6 K/UL (ref 0.8–3.5)
LYMPHOCYTES NFR BLD: 12 % (ref 12–49)
MCH RBC QN AUTO: 20.5 PG (ref 26–34)
MCHC RBC AUTO-ENTMCNC: 28.1 G/DL (ref 30–36.5)
MCV RBC AUTO: 73 FL (ref 80–99)
METAMYELOCYTES NFR BLD MANUAL: 0 %
MONOCYTES # BLD: 0.2 K/UL (ref 0–1)
MONOCYTES NFR BLD: 5 % (ref 5–13)
MYELOCYTES NFR BLD MANUAL: 0 %
NEUTS BAND NFR BLD MANUAL: 0 % (ref 0–6)
NEUTS SEG # BLD: 3.9 K/UL (ref 1.8–8)
NEUTS SEG NFR BLD: 83 % (ref 32–75)
NRBC # BLD: 0 K/UL (ref 0–0.01)
NRBC BLD-RTO: 0 PER 100 WBC
OTHER CELLS NFR BLD MANUAL: 0 %
PLATELET # BLD AUTO: 153 K/UL (ref 150–400)
PMV BLD AUTO: 9.7 FL (ref 8.9–12.9)
PROMYELOCYTES NFR BLD MANUAL: 0 %
RBC # BLD AUTO: 3.66 M/UL (ref 4.1–5.7)
RBC MORPH BLD: ABNORMAL
WBC # BLD AUTO: 4.7 K/UL (ref 4.1–11.1)

## 2021-10-08 PROCEDURE — 74011250636 HC RX REV CODE- 250/636: Performed by: INTERNAL MEDICINE

## 2021-10-08 PROCEDURE — 92526 ORAL FUNCTION THERAPY: CPT | Performed by: SPEECH-LANGUAGE PATHOLOGIST

## 2021-10-08 PROCEDURE — 97535 SELF CARE MNGMENT TRAINING: CPT | Performed by: OCCUPATIONAL THERAPIST

## 2021-10-08 PROCEDURE — 74011636637 HC RX REV CODE- 636/637: Performed by: INTERNAL MEDICINE

## 2021-10-08 PROCEDURE — 94640 AIRWAY INHALATION TREATMENT: CPT

## 2021-10-08 PROCEDURE — 74011250637 HC RX REV CODE- 250/637: Performed by: EMERGENCY MEDICINE

## 2021-10-08 PROCEDURE — 2709999900 HC NON-CHARGEABLE SUPPLY

## 2021-10-08 PROCEDURE — 77010033678 HC OXYGEN DAILY

## 2021-10-08 PROCEDURE — 85027 COMPLETE CBC AUTOMATED: CPT

## 2021-10-08 PROCEDURE — 74011250637 HC RX REV CODE- 250/637: Performed by: INTERNAL MEDICINE

## 2021-10-08 PROCEDURE — 97116 GAIT TRAINING THERAPY: CPT

## 2021-10-08 PROCEDURE — 65660000001 HC RM ICU INTERMED STEPDOWN

## 2021-10-08 PROCEDURE — 74011000250 HC RX REV CODE- 250: Performed by: INTERNAL MEDICINE

## 2021-10-08 PROCEDURE — 36415 COLL VENOUS BLD VENIPUNCTURE: CPT

## 2021-10-08 PROCEDURE — 74011000258 HC RX REV CODE- 258: Performed by: INTERNAL MEDICINE

## 2021-10-08 RX ADMIN — IRON SUCROSE 300 MG: 20 INJECTION, SOLUTION INTRAVENOUS at 11:00

## 2021-10-08 RX ADMIN — BUDESONIDE 500 MCG: 0.5 INHALANT RESPIRATORY (INHALATION) at 19:38

## 2021-10-08 RX ADMIN — BUDESONIDE 500 MCG: 0.5 INHALANT RESPIRATORY (INHALATION) at 07:16

## 2021-10-08 RX ADMIN — ASPIRIN 81 MG: 81 TABLET, COATED ORAL at 10:02

## 2021-10-08 RX ADMIN — CLOPIDOGREL BISULFATE 75 MG: 75 TABLET ORAL at 10:02

## 2021-10-08 RX ADMIN — CARVEDILOL 3.12 MG: 3.12 TABLET, FILM COATED ORAL at 17:51

## 2021-10-08 RX ADMIN — ACETAMINOPHEN 650 MG: 325 TABLET ORAL at 12:17

## 2021-10-08 RX ADMIN — FOLIC ACID 1 MG: 1 TABLET ORAL at 10:02

## 2021-10-08 RX ADMIN — PIPERACILLIN AND TAZOBACTAM 3.38 G: 3; .375 INJECTION, POWDER, LYOPHILIZED, FOR SOLUTION INTRAVENOUS at 10:02

## 2021-10-08 RX ADMIN — DIGOXIN 0.12 MG: 125 TABLET ORAL at 10:02

## 2021-10-08 RX ADMIN — PREDNISONE 40 MG: 20 TABLET ORAL at 10:02

## 2021-10-08 RX ADMIN — ARFORMOTEROL TARTRATE 15 MCG: 15 SOLUTION RESPIRATORY (INHALATION) at 07:16

## 2021-10-08 RX ADMIN — ALLOPURINOL 100 MG: 100 TABLET ORAL at 10:02

## 2021-10-08 RX ADMIN — PRAVASTATIN SODIUM 40 MG: 40 TABLET ORAL at 10:02

## 2021-10-08 RX ADMIN — BUMETANIDE 1 MG: 0.25 INJECTION, SOLUTION INTRAMUSCULAR; INTRAVENOUS at 10:02

## 2021-10-08 RX ADMIN — PIPERACILLIN AND TAZOBACTAM 3.38 G: 3; .375 INJECTION, POWDER, LYOPHILIZED, FOR SOLUTION INTRAVENOUS at 02:00

## 2021-10-08 RX ADMIN — ENOXAPARIN SODIUM 40 MG: 40 INJECTION SUBCUTANEOUS at 06:39

## 2021-10-08 RX ADMIN — CARVEDILOL 3.12 MG: 3.12 TABLET, FILM COATED ORAL at 10:02

## 2021-10-08 RX ADMIN — PIPERACILLIN AND TAZOBACTAM 3.38 G: 3; .375 INJECTION, POWDER, LYOPHILIZED, FOR SOLUTION INTRAVENOUS at 17:51

## 2021-10-08 RX ADMIN — BUMETANIDE 1 MG: 0.25 INJECTION, SOLUTION INTRAMUSCULAR; INTRAVENOUS at 22:22

## 2021-10-08 RX ADMIN — ARFORMOTEROL TARTRATE 15 MCG: 15 SOLUTION RESPIRATORY (INHALATION) at 19:38

## 2021-10-08 NOTE — PROGRESS NOTES
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 Case discussed with following: .     Patient complains of the following:feels better    Additional concerns noted by the staff:     Patient Vitals for the past 24 hrs:   BP Temp Pulse Resp SpO2 Weight   10/08/21 0804 (!) 138/49 98 °F (36.7 °C) (!) 54 22 95 %    10/08/21 0717     98 %    10/08/21 0421 (!) 128/41 98.4 °F (36.9 °C) (!) 59 18 97 %    10/08/21 0411      85.1 kg (187 lb 9.6 oz)   10/08/21 0249  98.4 °F (36.9 °C) 69 17 96 %    10/07/21 2255 (!) 150/65 98.6 °F (37 °C) 62 17 96 %    10/07/21 1955     92 %    10/07/21 1953 (!) 135/47 98.5 °F (36.9 °C) (!) 58 13 90 %    10/07/21 1822   66      10/07/21 1541 (!) 116/98 98.7 °F (37.1 °C) 64 18 92 %    10/07/21 1055 (!) 122/57 98.1 °F (36.7 °C) 67 16 96 %        Review of Systems:  12 point ROS done and negative except as above    Physical Examination:  Gen NAD  Resp no distress  Psych normal    Labs:  Recent Results (from the past 24 hour(s))   PROCALCITONIN    Collection Time: 10/07/21 11:11 AM   Result Value Ref Range    Procalcitonin <0.05 ng/mL   AMMONIA    Collection Time: 10/07/21  3:46 PM   Result Value Ref Range    Ammonia 39 (H) <32 UMOL/L   BLOOD GAS, ARTERIAL    Collection Time: 10/07/21  3:49 PM   Result Value Ref Range    pH 7.41 7.35 - 7.45      PCO2 43 35 - 45 mmHg    PO2 71 (L) 80 - 100 mmHg    O2 SAT 95 92 - 97 %    BICARBONATE 27 (H) 22 - 26 mmol/L    BASE EXCESS 1.8 mmol/L    O2 METHOD NASAL CANNULA      O2 FLOW RATE 3.00 L/min    Sample source ARTERIAL      SITE RIGHT RADIAL      SABRINA'S TEST NOT APPLICABLE     CBC WITH MANUAL DIFF    Collection Time: 10/08/21  3:45 AM   Result Value Ref Range    WBC 4.7 4.1 - 11.1 K/uL    RBC 3.66 (L) 4.10 - 5.70 M/uL    HGB 7.5 (L) 12.1 - 17.0 g/dL    HCT 26.7 (L) 36.6 - 50.3 %    MCV 73.0 (L) 80.0 - 99.0 FL    MCH 20.5 (L) 26.0 - 34.0 PG    MCHC 28.1 (L) 30.0 - 36.5 g/dL    RDW 20.6 (H) 11.5 - 14.5 %    PLATELET 955 311 - 480 K/uL    MPV 9.7 8.9 - 12.9 FL    NRBC 0.0 0  WBC    ABSOLUTE NRBC 0.00 0.00 - 0.01 K/uL    NEUTROPHILS 83 (H) 32 - 75 %    BAND NEUTROPHILS 0 0 - 6 %    LYMPHOCYTES 12 12 - 49 %    MONOCYTES 5 5 - 13 %    EOSINOPHILS 0 0 - 7 %    BASOPHILS 0 0 - 1 %    METAMYELOCYTES 0 0 %    MYELOCYTES 0 0 %    PROMYELOCYTES 0 0 %    BLASTS 0 0 %    OTHER CELL 0 0      IMMATURE GRANULOCYTES 0 %    ABS. NEUTROPHILS 3.9 1.8 - 8.0 K/UL    ABS. LYMPHOCYTES 0.6 (L) 0.8 - 3.5 K/UL    ABS. MONOCYTES 0.2 0.0 - 1.0 K/UL    ABS. EOSINOPHILS 0.0 0.0 - 0.4 K/UL    ABS. BASOPHILS 0.0 0.0 - 0.1 K/UL    ABS. IMM. GRANS. 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 Izquierdo, he had a partial glossectomy in the Fall of 2019 that showed invasive squamous cell Ca with negative margins and evidently no other evidence of disease  -will need to get back in to see Dr. Izquierdo after discharge as they do not come to [facility], I have left him a message to call me  -will likely need possible surgery vs Chemo/XRT    Anemia  -has DORCAS  -gave venofer yesterday, will order more for today and tomorrow if patient still here      Please call over the weekend with any questions. If patient still here on Monday, will have service f/u.   If discharged, please set up for f/u with me in the office next week

## 2021-10-08 NOTE — PROGRESS NOTES
Problem: Dysphagia (Adult)  Goal: *Acute Goals and Plan of Care (Insert Text)  Description: Initiated 10/5/2021  1. Patient will tolerate soft diet, thin liquids free of sequelae of aspiration within 7 days. 2. Patient will participate in MBS within 7 days. Outcome: Progressing Towards Goal     SPEECH LANGUAGE PATHOLOGY DYSPHAGIA TREATMENT  Patient: Benoit Mello (27 y.o. male)  Date: 10/8/2021  Diagnosis: CHF (congestive heart failure) ([facility]) [I50.9]  [facility] ([facility] [C02.9] <principal problem not specified>       Precautions:       ASSESSMENT:  Patient agreed to thin liquid trials only. Patient reported pain in right jaw that increases with talking and chewing. Patient reports no difficulty swallowing only inconsistent pain in jaw. Patient tolerated thin liquids without difficulty and free of s/s aspiration. PLAN:  Recommendations and Planned Interventions:  Soft diet   Thin liquids    Patient continues to benefit from skilled intervention to address the above impairments. Continue treatment per established plan of care. Discharge Recommendations: To Be Determined     SUBJECTIVE:   Patient stated I can't eat that. It (my jaw) will be hurting. Lala King  RN approved visit. OBJECTIVE:   Cognitive and Communication Status:  Neurologic State: Alert  Orientation Level: Oriented X4  Cognition: Follows commands  Perception: Appears intact  Perseveration: No perseveration noted  Safety/Judgement: Awareness of environment, Fall prevention  Dysphagia Treatment:  Oral Assessment:     P.O. Trials:  Patient Position: Up in chair  Vocal quality prior to P.O.: No impairment  Consistency Presented:  Thin liquid  How Presented: Self-fed/presented;Cup/sip     Bolus Acceptance: No impairment  Bolus Formation/Control: No impairment     Propulsion: No impairment  Oral Residue: None  Initiation of Swallow: No impairment     Aspiration Signs/Symptoms: None                        After treatment:   Patient left in no apparent distress in bed, Call bell within reach, and Nursing notified    COMMUNICATION/EDUCATION:   Patient was educated regarding  role of SLP and POC. Patient verbalized understanding    The patient's plan of care including recommendations, planned interventions, and recommended diet changes were discussed with: Registered nurse.      WISAM Zuniga  Time Calculation: 13 mins

## 2021-10-08 NOTE — PROGRESS NOTES
Pulmonary, Critical Care, and Sleep Medicine      Name: Harlan Andujar MRN: [id number]   : 1950 Hospital: [facility]   Date: 10/8/2021  Admission date: 10/4/2021 Hospital Day: 5       History:     10/7/2021 : Pt is acutely ill. Medical records and data reviewed. Office notes reviewed, Saw my partner a year ago. VIRAJ untreated. Still has CPAP at home. Leg edema. Pt admits to eating too much salt. Orders out because it is easier. Explained pathophysiology of PHTN, RV failure. 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 CTA without PE. Called Janice Smith Child [phone]  Discussed above and pt management. She noted pt hypersomnolent. Asked that we check ammonia level. Has been high on past. Pt used to drink. 10/8/2021 : Severe right jaw pain radiating to temple and under neck. No SOB. Pt is acutely ill. Medical records and data reviewed. No malignancy on cytology . Ammonia level 39    IMPRESSION:   1. Acute chronic respiratory failure with Hypoxia; has home O2 not not using it; no details  2. Severe Pulmonary Hypertension with moderately reduced RV systolic function on echo 10/5/21; PASP 81 mm Hg; preserved LVEF 55-60%  3. Hypersomnolence- metabolic or sleep deprivation? 4. VIRAJ- no details; not using machine due to tongue deformity  5. Aspiration pneumonia POA  6. Right pleural effusion; s/p thoracentesis 10/5/21 1100ml; No malugnant cells seen  7. Right middle lobe atelectasis vs pneumonia by chest CT  8. Mediastinal and right hilar adenopathy  9. Recurrent tongue cancer by CT head and neck   10. History Anal/ rectal Squamous cell Cancer ; 5 FU and mitomycin  11. COPD- has seen my partner dr. Merari Barclay  12. Atrial fibrillation  13. CAD   15. HTN  15. Leukopenic  16. Anemia   17. PAD s/p Bilateral SFA Stents Dr. Guevara Stockton, [facility]  18. H/o ETOH  Body mass index is 27.7 kg/m². 19. Prognosis guarded       RECOMMENDATIONS/PLAN:   1.  Treatment options may be very limited for Pulmonary HTN  2. Will need follow chest imaging to follow Right middle lobe and adenopathy  3. Oncology following  4. Supplemental O2 to keep sats > 93%  5. Bronchial hygiene with respiratory therapy techniques, bronchodilators  6. DVT prophylaxis  7. Prescription drug management with home med reconciliation reviewed  8. Gentle duresis  9. Will see over weekend upon request  10. At time of discharge we can arrange for f/u studies          [x] High complexity decision making was performed  [x] See my orders for details      Subjective/Initial History:     I was asked by Yoselin Edwards MD to see Ralph Reis  a 69 y.o.  male in consultation for a chief complaint of severe pulmonary hypertension    Excerpts from admission 10/4/2021 or consult notes as follows:     \"  Sol Eli is a 69 y.o. male with past medical history significant for atrial fibrillation, tongue cancer, COPD, GERD, CHF EF 20-25% in 2014, HTN  who presents with worsening shortness of breath for the past 3 to 4 days. Patient states that he began to experience shortness of breath when he was walking and exerting himself. He denies any PND orthopnea. He also complains of worsening lower extremity swelling. He saw his nurse practitioner today who sent him to the ED for further evaluation. Patient denies any chest pain. He denies any cough. He states that he has not seen oncology for some time but was supposed to follow-up with them this week for his history of tongue cancer. He currently denies any difficulty swallowing. \"    Admitted with presumed aspiration pneumonia. Thoracentesis performed for large right pleural effusion. Looks transudative on prelim studies. Ct scan reviewed and described Inferior right paratracheal lymph node is increased and measures  2.0 x 1.9 cm. Prevascular lymph nodes are increased and measure 1.1 cm in short axis. Subcarinal lymph node is increased and measures 1.6 cm in short axis. MELINDA: Right hilar lymphadenopathy measures up to 2.3 x 1.5 cm. Upper normal left  hilar lymph nodes. No chest pain. No fever. Echo done and LVEF normal but PASP 81 mm HG. VIRAJ not treated due to tongue cancer. Has home O2 but not used. Former smoker. Right jaw larger. No hemoptysis. On Advair, spiriva. On Plavix. CVA? Has stents for legs. Lives alone. Has not needed help. Seen by dr. Neville Watson today. Note reviewed. PCP Duane Hess. Allergies   Allergen Reactions    Levaquin [Levofloxacin] Hives        MAR reviewed and pertinent medications noted or modified as needed     Current Facility-Administered Medications   Medication    iron sucrose (VENOFER) 300 mg in 0.9% sodium chloride 250 mL IVPB    carvediloL (COREG) tablet 3.125 mg    allopurinoL (ZYLOPRIM) tablet 100 mg    aspirin delayed-release tablet 81 mg    clopidogreL (PLAVIX) tablet 75 mg    digoxin (LANOXIN) tablet 7.097 mg    folic acid (FOLVITE) tablet 1 mg    [Held by provider] gabapentin (NEURONTIN) capsule 600 mg    pravastatin (PRAVACHOL) tablet 40 mg    ipratropium (ATROVENT) 0.02 % nebulizer solution 0.5 mg    enoxaparin (LOVENOX) injection 40 mg    predniSONE (DELTASONE) tablet 40 mg    bumetanide (BUMEX) injection 1 mg    arformoteroL (BROVANA) neb solution 15 mcg    And    budesonide (PULMICORT) 500 mcg/2 ml nebulizer suspension    piperacillin-tazobactam (ZOSYN) 3.375 g in 0.9% sodium chloride (MBP/ADV) 100 mL MBP    labetaloL (NORMODYNE;TRANDATE) injection 5 mg    acetaminophen (TYLENOL) tablet 650 mg    ondansetron (ZOFRAN) injection 4 mg      Patient PCP: Roddy Echeverria MD  PMH:  has a past medical history of Atrial fibrillation ([address] CAD (coronary artery disease), Cancer ([address] Chronic obstructive pulmonary disease ([address] GERD (gastroesophageal reflux disease), Heart failure ([address] Hypertension, and Sleep apnea. He also has no past medical history of Adverse effect of anesthesia.   PSH:   has a past surgical history that includes hx afib ablation; colonoscopy (N/A, 2018); colonoscopy (N/A, 1/3/2020); hx orthopaedic; hx gi; hx gi; colonoscopy (N/A, 2020); vascular surgery procedure unlist; and flexible sigmoidoscopy (Left, 2020). FHX: family history includes Alcohol abuse in his father; Paulette Jeff in his brother and father; Stroke in his brother. SHX:  reports that he quit smoking about 10 years ago. He has never used smokeless tobacco. He reports that he does not drink alcohol and does not use drugs. ROS:A comprehensive review of systems was negative except for that written in the HPI. Objective:     Vital Signs: Telemetry:     Intake/Output:   Visit Vitals  BP (!) 138/49 (BP 1 Location: Left upper arm)   Pulse 65   Temp 98 °F (36.7 °C)   Resp 22   Ht 5' 9\" (1.753 m)   Wt 85.1 kg (187 lb 9.6 oz)   SpO2 95%   BMI 27.70 kg/m²       Temp (24hrs), Av.4 °F (36.9 °C), Min:98 °F (36.7 °C), Max:98.7 °F (37.1 °C)        O2 Device: Nasal cannula O2 Flow Rate (L/min): 3 l/min       Wt Readings from Last 4 Encounters:   10/08/21 85.1 kg (187 lb 9.6 oz)   21 87.1 kg (192 lb)   21 89.7 kg (197 lb 12 oz)   20 95.3 kg (210 lb)          Intake/Output Summary (Last 24 hours) at 10/8/2021 1133  Last data filed at 10/8/2021 0640  Gross per 24 hour   Intake 100 ml   Output 400 ml   Net -300 ml       Last shift:      No intake/output data recorded. Last 3 shifts: 10/06 1901 - 10/08 0700  In: 200 [P.O.:960; I.V.:700]  Out: 920 [Urine:920]       Physical Exam:   General:  male; in no respiratory distress and acyanotic, alert, oriented times 3 and moderately ill;  NC;  HEENT: NCAT, enlarged and firm right mandible, poor dentition, lips and mucosa dry  Eyes: anicteric; conjunctiva clear  Neck: no nodes, no accessory MM use. kyphotic  Chest: no deformity,   Cardiac: regular rate, rhythm; no murmur  Lungs: distant breath sounds; nowheezes, no rales, no rhonchi  Abd: soft, NT, hypoactive BS, OBESE,  Ext: no edema; no joint swelling; No clubbing  : No salomon,   Neuro: fluent,  follows commands; generalized weakness  Psych- no agitation, oriented to person;   Skin: warm, dry, no cyanosis;   Pulses: 1-2+ Bilateral pedal, radial  Capillary: brisk; pale    Labs:    Recent Labs     10/08/21  0345 10/06/21  0250   WBC 4.7 2.0*   HGB 7.5* 8.4*    168     Recent Labs     10/07/21  0358 10/06/21  0250   * 139   K 4.6 4.3    106   CO2 30 34*   GLU 97 165*   BUN 22* 16   CREA 1.45* 1.36*   CA 8.1* 8.2*   MG 2.1  --      Recent Labs     10/07/21  1549   PH 7.41   PCO2 43   PO2 71*   HCO3 27*     No results for input(s): CPK, CKNDX, TROIQ in the last 72 hours. No lab exists for component: CPKMB  Lab Results   Component Value Date/Time     (H) 01/08/2014 01:48 AM      Lab Results   Component Value Date/Time    Culture result: NO GROWTH THUS FAR 10/05/2021 02:00 PM    Culture result: NO GROWTH THUS FAR 10/05/2021 12:45 PM    Culture result: NO GROWTH 5 DAYS 08/19/2021 10:10 AM     Lab Results   Component Value Date/Time    TSH 0.73 01/07/2014 04:44 AM       Imaging:    CXR Results  (Last 48 hours)    None        Results from [place] encounter on 10/04/21    XR CHEST PORT    Narrative  EXAM: XR CHEST PORT    INDICATION: post thoracentesis    COMPARISON: Chest x-ray 10/5/2021 and chest x-ray 10/4/2021. FINDINGS: A portable AP radiograph of the chest was obtained at 14:08 hours. The  patient is on a cardiac monitor. There is been interval reduction of previously  seen subpulmonic pleural effusion with no pneumothorax. There is no significant  change in pulmonary vascular prominence with diffuse interstitial edema and  focal area of consolidation in the right middle lobe. The cardiac and  mediastinal contours and pulmonary vascularity are normal.  The bones and soft  tissues are grossly within normal limits. Impression  Reduced right pleural effusion with no pneumothorax. Congestion with  interstitial edema and right middle lobe consolidation redemonstrated. XR CHEST PORT    Narrative  EXAM: Portable CXR. 1053 hours. COMPARISON: 10/4/2021. INDICATION: resp distress, pleural effusion R sided followup    FINDINGS:  Unchanged mild pulmonary edema, moderate right pleural effusion, right middle  lobe airspace disease/atelectasis, and cardiomegaly. No pneumothorax or midline  shift. Impression  Stable disease. XR CHEST PORT    Narrative  INDICATION: . dyspnea  Additional history:  COMPARISON: Previous chest xray, 8/19/2021. A CT of the chest, 8/19/2021. LIMITATIONS: Portable technique. Rotation. [facility] FINDINGS: Single frontal view of the chest.  .  Lines/tubes/surgical: Cardiac monitor leads overly the patient. Heart/mediastinum: Unremarkable. Lungs/pleura: Persistent opacity in the right middle lobe. No visualized pleural  effusion or pneumothorax. Additional Comments: None. .    Impression  1. Persistent right middle lobe opacity. Results from [place] encounter on 10/04/21    CT HEAD WO CONT    Narrative  EXAM: CT HEAD WO CONT    INDICATION: Garbled/slurred speech onset 12:30 today. Evaluate for CVA/brain  mass/hemorrhage. COMPARISON: None. CONTRAST: None. TECHNIQUE: Unenhanced CT of the head was performed using 5 mm images. Brain and  bone windows were generated. Coronal and sagittal reformats. CT dose reduction  was achieved through use of a standardized protocol tailored for this  examination and automatic exposure control for dose modulation. FINDINGS:  The ventricles and sulci are normal in size, shape and configuration. . There is  mild periventricular and subcortical white matter hypodensity. There is no  intracranial hemorrhage, extra-axial collection, or mass effect. The basilar  cisterns are open. No CT evidence of acute infarct. Atherosclerotic  calcifications affect the carotid siphons.     The bone windows demonstrate no abnormalities. The visualized portions of the  paranasal sinuses and mastoid air cells are clear. Impression  Nonspecific white matter changes most likely reflective of chronic  small vessel ischemic and/or senescent change. No CT evidence of hemorrhage,  acute infarct, or mass. Intracranial atherosclerosis.       This care involved high complexity medical decision making: I personally:  · Reviewed the flowsheet and previous days notes  · Reviewed and summarized records or history from previous days note or discussions with staff, family  · High Risk Drug therapy requiring intensive monitoring for toxicity: eg steroids, antibiotics  · Reviewed and/or ordered Clinical lab tests  · Reviewed images and/or ordered Radiology tests  · Reviewed the patients / Telemetry  · discussed my assessment/management with : Nursing, for coordination of care    Nyra Dandy, MD

## 2021-10-08 NOTE — PROGRESS NOTES
Problem: Self Care Deficits Care Plan (Adult)  Goal: *Acute Goals and Plan of Care (Insert Text)  Description: FUNCTIONAL STATUS PRIOR TO ADMISSION: ambualted with SPC at times, performed all ADLs and IADLS without assist, driving     [address]: The patient lived alone. Occupational Therapy Goals:  Initiated 10/5/2021  1. Patient will perform grooming standing with rest breaks as needed with modified independence within 7 days. 2. Patient will perform toileting with modified independence within 7 days. 3. Patient will perform upper body dressing and lower body dressing with modified independence within 7 days. 4. Patient will transfer from toilet with modified independence using the least restrictive device and appropriate durable medical equipment within 7 days. Outcome: Progressing Towards Goal   OCCUPATIONAL THERAPY TREATMENT  Patient: Ralph Reis (10 y.o. male)  Date: 10/8/2021  Diagnosis: CHF (congestive heart failure) ([facility]) [I50.9]  Tongue cancer ([facility] [C02.9] <principal problem not specified>       Precautions:  none  Chart, occupational therapy assessment, plan of care, and goals were reviewed. ASSESSMENT  Patient continues with skilled OT services and is progressing towards goals. Pt was seated at bedside chair upon arrival.  Pt reports that he does have home O2 but wasn't using it. Reinforced with pt that he does need to wear his O2 24/7 at discharge as O2 sat with activity was 91% on 2L. Educated on line management during functional tasks and the need to wear the O2 even in the shower. Pt is performing mobility at a SBA with rolling walker and lower body dressing with CGA. Recommend home health with increased assist at discharge.         O2 stat with activity 2L 91%    Current Level of Function Impacting Discharge (ADLs): CGA mobility without assist devices, SBA with rolling walker, donned underwear seated to thread CGA    Other factors to consider for discharge: none         PLAN :  Patient continues to benefit from skilled intervention to address the above impairments. Continue treatment per established plan of care to address goals. Recommend with staff: OOB to chair and to bathroom for ADLS with assist    Recommend next OT session: standing tasks, IADLS    Recommendation for discharge: (in order for the patient to meet his/her long term goals)  Occupational therapy at least 2 days/week in the home AND ensure assist and/or supervision for safety with IADLS    This discharge recommendation:  Has been made in collaboration with the attending provider and/or case management    IF patient discharges home will need the following DME: rolling walker       SUBJECTIVE:   Patient stated Juanis Gallagher will be able to take care of myself. I had oxygen at home. I just got it but didn't use it.     OBJECTIVE DATA SUMMARY:   Cognitive/Behavioral Status:  Neurologic State: Alert  Orientation Level: Oriented X4  Cognition: Follows commands  Perception: Appears intact  Perseveration: No perseveration noted  Safety/Judgement: Awareness of environment; Fall prevention    Functional Mobility and Transfers for ADLs:  Bed Mobility:  Scooting: Supervision    Transfers:  Sit to Stand: Supervision  Functional Transfers  Bathroom Mobility: Contact guard assistance  Bed to Chair: Supervision (with Rw and SBA without)    Balance:  Standing - Static: Good  Standing - Dynamic : Good (with RW occasional without )    ADL Intervention:    Lower Body Dressing Assistance  Underpants: Supervision    Had pt manage O2 during ADL mobility with CGA. Cognitive Retraining  Safety/Judgement: Awareness of environment; Fall prevention      Activity Tolerance:   Fair and requires rest breaks    After treatment patient left in no apparent distress:   Sitting in chair and Call bell within reach    COMMUNICATION/COLLABORATION:   The patients plan of care was discussed with: Physical therapy assistant, Registered nurse, and radha .      Johan Suarez, OTR/L  Time Calculation: 15 mins

## 2021-10-08 NOTE — PROGRESS NOTES
Hospitalist Progress Note    NAME: Zulema Gilliam   :  1950   MRN:  [id number]       Assessment / Plan:  Aspiration Pneumonia POA - RML on CXR & CTA chest ?Malignant mass  With Worsening R Pleural Effusion POA- moderate  COPD  CHF  CTA showed 2. Mild pulmonary edema and increased moderate right pleural effusion. 3. Increased right middle lobe partial atelectasis versus pneumonia. Increased  mediastinal and right hilar lymphadenopathy. Consider underlying right middle  lobe bronchogenic neoplasm.    -Cont oxygen to keep sats>90%, currently on 3 L. Lower than yesterday supplements  -Improving clinically, change Zosyn to Augmentin for aspiration pneumonia  Repeat procalcitonin still low, will change Zosyn to Augmentin for 7 more days  -Appreciate speech recommendation, advance diet, may need MBS in the future  -s/p right thoracentesis, 1.1 L, seems transudative, cytology pending  -Echo showed EF of 55 to 60%, but severe pulmonary hypertension, PASP 81    -Continue Bumex 1 mg IV twice daily  -Continue prednisone 40 mg p.o. daily  -Pulmonology input is appreciated  We will still need to wean him off to his home oxygen before discharge or this is might be his new baseline      Hx of tongue cancer s/p resection, XRT and chemo  Repeat CT scan showed Findings compatible with local recurrence of known tongue cancer on the right with extension from the right tongue base to the mandibular body with there is direct bony invasion as well as confluent right level 2 cervical chain  Lymphadenopathy  Oncology input is appreciated, he needs to follow-up as an outpatient with ENT for possible recurrent head and neck cancer she is complaining of jaw pain     Garbled/Slurred speech 10/05, not POA  R/o CVA versus Brain mets  Persistent afib POA- not on full anticoagulation ? reason    C/w coreg, decreased dose today  C/w digoxin, check digoxin level    -Had garbled speech 10/05 which is resolved, CT head and MRI brain without acute CVA  Cont ASA, plavix, lipitor  Neurology input is appreciated  Stroke orderset triggered    Iron deficiency anemia  Continue IV iron  Hemodynamically stable  No indication for transfusion at this point       CKD stage III  EDGAR ruled out  Cr 1.4 on arrival  Baseline around 1.2  Avoid nephrotoxins  Trend Cr     Neuropathy  C/w gabapentin     Colon cancer 2014           Code Status: Full Code   Surrogate Decision Maker: Antonio Mello     DVT Prophylaxis: Lovenox  GI Prophylaxis: not indicated     Baseline: independent      Estimated discharge date: October 9  Barriers: Clinical improvement    Recommended Disposition: Home w/Family and HH PT, OT, RN? TBD     Subjective:   Patient was seen and examined. No acute events overnight. Discussed with RN overnight events. All patient's questions were answered. \"Doing all right\"    Review of Systems:  Symptom Y/N Comments  Symptom Y/N Comments   Fever/Chills n   Chest Pain n    Poor Appetite    Edema     Cough y  improving  Abdominal Pain n    Sputum y   Joint Pain     SOB/RILEY y  improving  Pruritis/Rash     Nausea/vomit n   Tolerating PT/OT     Diarrhea    Tolerating Diet y    Constipation    Other y  jaw pain     Could NOT obtain due to:          Objective:     VITALS:   Last 24hrs VS reviewed since prior progress note.  Most recent are:  Patient Vitals for the past 24 hrs:   Temp Pulse Resp BP SpO2   10/08/21 1156 98.6 °F (37 °C) (!) 53 18 (!) 131/96 96 %   10/08/21 1002  65      10/08/21 0804 98 °F (36.7 °C) (!) 54 22 (!) 138/49 95 %   10/08/21 0717     98 %   10/08/21 0421 98.4 °F (36.9 °C) (!) 59 18 (!) 128/41 97 %   10/08/21 0249 98.4 °F (36.9 °C) 69 17  96 %   10/07/21 2255 98.6 °F (37 °C) 62 17 (!) 150/65 96 %   10/07/21 1955     92 %   10/07/21 1953 98.5 °F (36.9 °C) (!) 58 13 (!) 135/47 90 %   10/07/21 1822  66      10/07/21 1541 98.7 °F (37.1 °C) 64 18 (!) 116/98 92 %       Intake/Output Summary (Last 24 hours) at 10/8/2021 Waqar filed at 10/8/2021 0640  Gross per 24 hour   Intake 100 ml   Output 400 ml   Net -300 ml        I had a face to face encounter and independently examined this patient on 10/8/2021, as outlined below:  PHYSICAL EXAM:  General: WD, WN. Alert, cooperative, no acute distress    EENT:  EOMI. Anicteric sclerae. MMM  Resp:  Lungs clear bilaterally  CV:  Regular  rhythm,  No edema,  GI:  Soft, Non distended, Non tender. +Bowel sounds  Neurologic:  Alert and oriented X 3, normal speech,   Psych:   Good insight. Not anxious nor agitated  Skin:  No rashes. No jaundice    Reviewed most current lab test results and cultures  YES  Reviewed most current radiology test results   YES  Review and summation of old records today    NO  Reviewed patient's current orders and MAR    YES  PMH/ reviewed - no change compared to H&P  ________________________________________________________________________  Care Plan discussed with:    Comments   Patient x    Family      RN     Care Manager x    Consultant  x  pulmonology Dr. Molly Carrillo team rounds were held today with , nursing, pharmacist and clinical coordinator. Patient's plan of care was discussed; medications were reviewed and discharge planning was addressed. ________________________________________________________________________  Total NON critical care TIME:  36   Minutes    Total CRITICAL CARE TIME Spent:   Minutes non procedure based      Comments   >50% of visit spent in counseling and coordination of care     ________________________________________________________________________  Hayder Mcneal MD     Procedures: see electronic medical records for all procedures/Xrays and details which were not copied into this note but were reviewed prior to creation of Plan. LABS:  I reviewed today's most current labs and imaging studies.   Pertinent labs include:  Recent Labs     10/08/21  0345 10/06/21  0250   WBC 4.7 2.0*   HGB 7.5* 8.4*   HCT 26.7* 31.2*    168     Recent Labs     10/07/21  0358 10/06/21  0250   * 139   K 4.6 4.3    106   CO2 30 34*   GLU 97 165*   BUN 22* 16   CREA 1.45* 1.36*   CA 8.1* 8.2*   MG 2.1  --        Signed: Sandy Sandifer, MD

## 2021-10-08 NOTE — PROGRESS NOTES
Problem: Mobility Impaired (Adult and Pediatric)  Goal: *Acute Goals and Plan of Care (Insert Text)  Description: FUNCTIONAL STATUS PRIOR TO ADMISSION: Pt lives alone in one story home. He is normally independent with all activity with use of a cane although does reports some difficulty donning shoes and socks. He was noted in PT session on recent admission 8/24/21 to be able to amb 150'x2 with S only, no device at that time on 2L. HOME SUPPORT PRIOR TO ADMISSION: The patient lived alone with no local support. Physical Therapy Goals  Initiated 10/5/2021  1. Patient will move from supine to sit and sit to supine , scoot up and down, and roll side to side in bed with independence within 7 day(s). 2.  Patient will transfer from bed to chair and chair to bed with modified independence using the least restrictive device within 7 day(s). 3.  Patient will perform sit to stand with modified independence within 7 day(s). 4.  Patient will ambulate with modified independence for 150 feet with the least restrictive device within 7 day(s). 5.  Patient will ascend/descend 3 stairs with handrail(s) with modified independence within 7 day(s). Outcome: Progressing Towards Goal   PHYSICAL THERAPY TREATMENT  Patient: Arturo Beasley (19 y.o. male)  Date: 10/8/2021  Diagnosis: CHF (congestive heart failure) ([facility]) [I50.9]  Tong[facility] ([facility] [C02.9] <principal problem not specified>       Precautions:    Chart, physical therapy assessment, plan of care and goals were reviewed. ASSESSMENT  Patient continues with skilled PT services and is progressing towards goals. Pt presents with decreased strength and endurance. Pt received on 2LPM. Pt performed sit to stand transfer at supervision. Pt ambulated 25ft with RW and 25ft with no device at [facility]. Pt with increased trunk flexion but with no LOB or instability. Pt may benefit form RW when fatigued. Pt reported he didn't need it.  Pts o2 stats at 91% after ambulation on 2LPM. Pt educated on using home oxygen due to needing to right now. Pt with understanding. Pt with improved mobility tolerance and safety. Pt would benefit from increased supervision or having someone check in on him. Current Level of Function Impacting Discharge (mobility/balance): mobility at CGA/SBA     Other factors to consider for discharge: decreased strength and endurance. PLAN :  Patient continues to benefit from skilled intervention to address the above impairments. Continue treatment per established plan of care. to address goals. Recommendation for discharge: (in order for the patient to meet his/her long term goals)  Physical therapy at least 2 days/week in the home AND ensure assist and/or supervision for safety with mobility     This discharge recommendation:  Has been made in collaboration with the attending provider and/or case management    IF patient discharges home will need the following DME: rolling walker       SUBJECTIVE:   Patient stated  I have a head ache.     OBJECTIVE DATA SUMMARY:   Critical Behavior:  Neurologic State: Alert  Orientation Level: Oriented X4  Cognition: Follows commands  Safety/Judgement: Awareness of environment, Fall prevention  Functional Mobility Training:  Bed Mobility:           Scooting: Supervision        Transfers:  Sit to Stand: Supervision  Stand to Sit: Supervision        Bed to Chair: Supervision (with Rw and SBA without)                    Balance:  Standing - Static: Good  Standing - Dynamic : Good (with RW occasional without )  Ambulation/Gait Training:  Distance (ft): 50 Feet (ft) (25ft with RW 25ft with no device)  Assistive Device: Gait belt;Walker, rolling  Ambulation - Level of Assistance: Contact guard assistance        Gait Abnormalities: Decreased step clearance (trunk flexion )        Base of Support: Widened     Speed/Shruthi: Pace decreased (<100 feet/min)  Step Length: Left shortened;Right shortened          Pain Rating:  Pt reported a head ache     Activity Tolerance:   Fair and desaturates with exertion and requires oxygen    After treatment patient left in no apparent distress:   Sitting in chair and Call bell within reach    COMMUNICATION/COLLABORATION:   The patients plan of care was discussed with: Registered nurse.      Toya Juárez PTA   Time Calculation: 17 mins

## 2021-10-08 NOTE — PROGRESS NOTES
Problem: Falls - Risk of  Goal: *Absence of Falls  Description: Document Olimpia Soriano Fall Risk and appropriate interventions in the flowsheet. Outcome: Progressing Towards Goal  Note: Fall Risk Interventions:  Mobility Interventions: Assess mobility with egress test         Medication Interventions: Bed/chair exit alarm    Elimination Interventions: Bed/chair exit alarm              Problem: Patient Education: Go to Patient Education Activity  Goal: Patient/Family Education  Outcome: Progressing Towards Goal     Problem: Patient Education: Go to Patient Education Activity  Goal: Patient/Family Education  Outcome: Progressing Towards Goal     Problem: Patient Education: Go to Patient Education Activity  Goal: Patient/Family Education  Outcome: Progressing Towards Goal     Problem: Patient Education: Go to Patient Education Activity  Goal: Patient/Family Education  Outcome: Progressing Towards Goal     Problem: Pressure Injury - Risk of  Goal: *Prevention of pressure injury  Description: Document Ignacio Scale and appropriate interventions in the flowsheet.   Outcome: Progressing Towards Goal  Note: Pressure Injury Interventions:  Sensory Interventions: Assess changes in LOC    Moisture Interventions: Absorbent underpads    Activity Interventions: Increase time out of bed    Mobility Interventions: Float heels, HOB 30 degrees or less    Nutrition Interventions: Document food/fluid/supplement intake    Friction and Shear Interventions: Foam dressings/transparent film/skin sealants

## 2021-10-08 NOTE — PROGRESS NOTES
Transition of Care Plan:     RUR: 25%  Disposition: Home with Outpatient service (PT/OT)  Follow up appointments:PCP; Cardiologist  DME needed: None  Transportation at Discharge: Pt's daughter Colton Bello will transport at d/c.   [place] or means to access home:  Pt has keys       IM Medicare Letter: Given 10/8/2021  Is patient a BCPI-A Bundle:  N/A                    If yes, was Bundle Letter given?:  N/A   Caregiver Contact:Janice Lopez- Daughter [phone]  Discharge Caregiver contacted prior to discharge?   CM will notify caregiver at d/c.     12:12pm- [facility] called CM via phone to schedule pt's follow-up appointment. Appointment scheduled for Oct 12, 2021 at 2:45pm with Antoine Nava NP.     12:08pm-  met with pt at bedside to discuss d/c plan. Medicare pt has received, reviewed, and signed 2nd  letter informing them of their right to appeal the discharge. Signed copy has been placed on pt bedside chart. 11:30am- CM called pt's PCP to schedule f/u appointment. No answer. CM left a voicemail message. CM will continue to follow patient for discharge planning needs and arrange for services as deemed necessary.     Lenin Souza [address]  [phone]

## 2021-10-08 NOTE — PROGRESS NOTES
End of Shift Note    Bedside shift change report given to [address] [place] [address] (oncoming nurse) by Monisha Grider (offgoing nurse). Report included the following information SBAR, Kardex and ED Summary    Shift worked:  Day     Shift summary and any significant changes:     possible discharge tomorrow      Concerns for physician to address:       Zone phone for oncoming shift:          Activity:  Activity Level: Up with Assistance  Number times ambulated in hallways past shift: 0  Number of times OOB to chair past shift: 4    Cardiac:   Cardiac Monitoring: Yes      Cardiac Rhythm: Atrial Fib    Access:   Current line(s): PIV     Genitourinary:   Urinary status: voiding    Respiratory:   O2 Device: Nasal cannula  Chronic home O2 use?: NO  Incentive spirometer at bedside: YES     GI:  Last Bowel Movement Date: 10/07/21  Current diet:  ADULT DIET Dysphagia - Soft & Bite Sized; No Salt Added (3-4 gm)  DIET ONE TIME MESSAGE  Passing flatus: YES  Tolerating current diet: YES       Pain Management:   Patient states pain is manageable on current regimen: N/A    Skin:  Ignacio Score: 16  Interventions: PT/OT consult    Patient Safety:  Fall Score:  Total Score: 3  Interventions: assistive device (walker, cane, etc)  High Fall Risk: Yes    Length of Stay:  Expected LOS: 5d 9h  Actual LOS: [place]

## 2021-10-08 NOTE — PROGRESS NOTES
ADULT PROTOCOL: JET AEROSOL ASSESSMENT    Patient  Yadira Gandara     69 y.o.   male     10/8/2021  8:49 AM    Breath Sounds Pre Procedure: Right Breath Sounds: Diminished                               Left Breath Sounds: Diminished    Breath Sounds Post Procedure: Right Breath Sounds: Diminished                                 Left Breath Sounds: Clear, Diminished    Breathing pattern: Pre procedure Breathing Pattern: Regular          Post procedure Breathing Pattern: Regular    Heart Rate: Pre procedure Pulse: 69           Post procedure Pulse: 55    Resp Rate: Pre procedure Respirations: 18           Post procedure Respirations: 18      Cough: Pre procedure Cough: Non-productive               Post procedure        Oxygen: 4L/NC  Changed: No    SpO2: Pre procedure SpO2: 98 %               Post procedure SpO2: 99 %      Nebulizer Therapy: Current medications Aerosolized Medications: Brovana, Pulmicort      Changed: No        Problem List:   Patient Active Problem List   Diagnosis Code    Atrial flutter ([facility]) I48.92    COPD (chronic obstructive pulmonary disease) ([facility]) J44.9    Dilated cardiomyopathy ([facility]) I42.0    ETOH abuse F10.10    Acute on chronic systolic heart failure ([facility]) I50.23    PAD (peripheral artery disease) ([facility]) I73.9    Diarrhea R19.7    Aspiration pneumonia ([facility]) J69.0    Cellulitis of left hand L03.114    Acute respiratory failure with hypoxia ([address] J96.01    CHF (congestive heart failure) ([address] I50.9    Tongue cancer ([facility] C02.9    Aphasia R47.01       Respiratory Therapist: Jj Verdugo, RT

## 2021-10-09 VITALS
HEIGHT: 69 IN | OXYGEN SATURATION: 94 % | RESPIRATION RATE: 22 BRPM | SYSTOLIC BLOOD PRESSURE: 100 MMHG | WEIGHT: 177.2 LBS | BODY MASS INDEX: 26.25 KG/M2 | TEMPERATURE: 98 F | DIASTOLIC BLOOD PRESSURE: 76 MMHG | HEART RATE: 72 BPM

## 2021-10-09 PROBLEM — R47.01 APHASIA: Status: RESOLVED | Noted: 2021-10-05 | Resolved: 2021-10-09

## 2021-10-09 PROBLEM — J69.0 ASPIRATION PNEUMONIA (HCC): Status: RESOLVED | Noted: 2021-08-19 | Resolved: 2021-10-09

## 2021-10-09 PROBLEM — J96.01 ACUTE RESPIRATORY FAILURE WITH HYPOXIA (HCC): Status: RESOLVED | Noted: 2021-08-19 | Resolved: 2021-10-09

## 2021-10-09 LAB
ANION GAP SERPL CALC-SCNC: 3 MMOL/L (ref 5–15)
BACTERIA SPEC CULT: NORMAL
BACTERIA SPEC CULT: NORMAL
BUN SERPL-MCNC: 17 MG/DL (ref 6–20)
BUN/CREAT SERPL: 15 (ref 12–20)
CALCIUM SERPL-MCNC: 8.7 MG/DL (ref 8.5–10.1)
CHLORIDE SERPL-SCNC: 104 MMOL/L (ref 97–108)
CO2 SERPL-SCNC: 33 MMOL/L (ref 21–32)
CREAT SERPL-MCNC: 1.1 MG/DL (ref 0.7–1.3)
GLUCOSE SERPL-MCNC: 90 MG/DL (ref 65–100)
GRAM STN SPEC: NORMAL
GRAM STN SPEC: NORMAL
POTASSIUM SERPL-SCNC: 3.8 MMOL/L (ref 3.5–5.1)
SERVICE CMNT-IMP: NORMAL
SERVICE CMNT-IMP: NORMAL
SODIUM SERPL-SCNC: 140 MMOL/L (ref 136–145)

## 2021-10-09 PROCEDURE — 74011250636 HC RX REV CODE- 250/636: Performed by: INTERNAL MEDICINE

## 2021-10-09 PROCEDURE — 74011000250 HC RX REV CODE- 250: Performed by: INTERNAL MEDICINE

## 2021-10-09 PROCEDURE — 74011250637 HC RX REV CODE- 250/637: Performed by: INTERNAL MEDICINE

## 2021-10-09 PROCEDURE — 74011000258 HC RX REV CODE- 258: Performed by: INTERNAL MEDICINE

## 2021-10-09 PROCEDURE — 77010033678 HC OXYGEN DAILY

## 2021-10-09 PROCEDURE — 94640 AIRWAY INHALATION TREATMENT: CPT

## 2021-10-09 PROCEDURE — 36415 COLL VENOUS BLD VENIPUNCTURE: CPT

## 2021-10-09 PROCEDURE — 80048 BASIC METABOLIC PNL TOTAL CA: CPT

## 2021-10-09 PROCEDURE — 74011636637 HC RX REV CODE- 636/637: Performed by: INTERNAL MEDICINE

## 2021-10-09 PROCEDURE — 2709999900 HC NON-CHARGEABLE SUPPLY

## 2021-10-09 RX ORDER — FUROSEMIDE 20 MG/1
20 TABLET ORAL EVERY 12 HOURS
Qty: 60 TABLET | Refills: 0 | Status: SHIPPED | OUTPATIENT
Start: 2021-10-09 | End: 2021-11-08

## 2021-10-09 RX ORDER — AMOXICILLIN AND CLAVULANATE POTASSIUM 500; 125 MG/1; MG/1
1 TABLET, FILM COATED ORAL 2 TIMES DAILY
Qty: 10 TABLET | Refills: 0 | Status: SHIPPED | OUTPATIENT
Start: 2021-10-09 | End: 2021-10-14

## 2021-10-09 RX ORDER — CARVEDILOL 3.12 MG/1
3.12 TABLET ORAL 2 TIMES DAILY
Qty: 60 TABLET | Refills: 0 | Status: SHIPPED | OUTPATIENT
Start: 2021-10-09

## 2021-10-09 RX ORDER — LANOLIN ALCOHOL/MO/W.PET/CERES
325 CREAM (GRAM) TOPICAL
Qty: 30 TABLET | Refills: 0 | Status: SHIPPED | OUTPATIENT
Start: 2021-10-09

## 2021-10-09 RX ORDER — PREDNISONE 20 MG/1
40 TABLET ORAL
Qty: 14 TABLET | Refills: 0 | Status: SHIPPED | OUTPATIENT
Start: 2021-10-10 | End: 2021-10-17

## 2021-10-09 RX ADMIN — PRAVASTATIN SODIUM 40 MG: 40 TABLET ORAL at 08:52

## 2021-10-09 RX ADMIN — BUMETANIDE 1 MG: 0.25 INJECTION, SOLUTION INTRAMUSCULAR; INTRAVENOUS at 08:46

## 2021-10-09 RX ADMIN — BUDESONIDE 500 MCG: 0.5 INHALANT RESPIRATORY (INHALATION) at 07:35

## 2021-10-09 RX ADMIN — ALLOPURINOL 100 MG: 100 TABLET ORAL at 08:47

## 2021-10-09 RX ADMIN — PIPERACILLIN AND TAZOBACTAM 3.38 G: 3; .375 INJECTION, POWDER, LYOPHILIZED, FOR SOLUTION INTRAVENOUS at 01:04

## 2021-10-09 RX ADMIN — PREDNISONE 40 MG: 20 TABLET ORAL at 08:37

## 2021-10-09 RX ADMIN — FOLIC ACID 1 MG: 1 TABLET ORAL at 08:46

## 2021-10-09 RX ADMIN — PIPERACILLIN AND TAZOBACTAM 3.38 G: 3; .375 INJECTION, POWDER, LYOPHILIZED, FOR SOLUTION INTRAVENOUS at 08:37

## 2021-10-09 RX ADMIN — CLOPIDOGREL BISULFATE 75 MG: 75 TABLET ORAL at 08:46

## 2021-10-09 RX ADMIN — ARFORMOTEROL TARTRATE 15 MCG: 15 SOLUTION RESPIRATORY (INHALATION) at 07:35

## 2021-10-09 RX ADMIN — ASPIRIN 81 MG: 81 TABLET, COATED ORAL at 08:46

## 2021-10-09 RX ADMIN — DIGOXIN 0.12 MG: 125 TABLET ORAL at 08:46

## 2021-10-09 RX ADMIN — ENOXAPARIN SODIUM 40 MG: 40 INJECTION SUBCUTANEOUS at 08:37

## 2021-10-09 RX ADMIN — CARVEDILOL 3.12 MG: 3.12 TABLET, FILM COATED ORAL at 08:46

## 2021-10-09 NOTE — PROGRESS NOTES
End of Shift Note    Bedside shift change report given to [id number] (oncoming nurse) by Dot Runner (offgoing nurse). Report included the following information SBAR and Kardex    Shift worked:  [id number]     Shift summary and any significant changes:    none   Concerns for physician to address: none   Zone phone for oncoming shift:       Activity:  Activity Level: Up with Assistance  Number times ambulated in hallways past shift: 0  Number of times OOB to chair past shift: 0    Cardiac:   Cardiac Monitoring: Yes      Cardiac Rhythm: Atrial Fib    Access:   Current line(s): PIV     Genitourinary:   Urinary status: voiding    Respiratory:   O2 Device: Nasal cannula  Chronic home O2 use?: NO  Incentive spirometer at bedside: YES     GI:  Last Bowel Movement Date: 10/08/21  Current diet:  ADULT DIET Dysphagia - Soft & Bite Sized; No Salt Added (3-4 gm)  DIET ONE TIME MESSAGE  Passing flatus: YES  Tolerating current diet: YES       Pain Management:   Patient states pain is manageable on current regimen: YES    Skin:  Ignacio Score: 16  Interventions: increase time out of bed    Patient Safety:  Fall Score:  Total Score: 3  Interventions: gripper socks and pt to call before getting OOB  High Fall Risk: Yes    Length of Stay:  Expected LOS: 5d 9h  Actual LOS: [address]

## 2021-10-09 NOTE — PROGRESS NOTES
Transition of Care Plan:     RUR: 25%  Disposition: Home with Outpatient service (PT/OT)  Follow up appointments:PCP; Cardiologist  DME needed: None  Transportation at Discharge: Pt's daughter Jose Bay will transport at d/c.   [place] or means to access home:  Pt has keys       IM Medicare Rolfe Costain 10/8/2021  Is patient a BCPI-A Bundle:  N/A                    If yes, was Bundle Letter given?:  N/A   Caregiver Contact:Janice Blancas- Daughter [phone]  Discharge Caregiver contacted prior to discharge?   Yes    3:10pm-No further CM needs identified. CM notified pt's nurse of d/c.    3:04pm. CM met with pt at bedside to discuss d/c plan. Pt stated that his daughter will transport at d/c. Care Management Interventions  PCP Verified by CM: Yes  Palliative Care Criteria Met (RRAT>21 & CHF Dx)?: No  Mode of Transport at Discharge:  (Pt's daughter Jose Bay will transport at d/c. )  Transition of Care Consult (CM Consult):  Other [facility] Outpatient service (PT/OT))  Discharge Durable Medical Equipment: No  Physical Therapy Consult: Yes  Occupational Therapy Consult: Yes  Speech Therapy Consult: Yes  Support Systems: Other (Comment)  Confirm Follow Up Transport: [address] Provided?: No  Discharge Location  Discharge Placement: Home with outpatient services [facility] Outpatient service (PT/OT))      Lenin Gramajo [address] [place] [address]  [phone]

## 2021-10-09 NOTE — PROGRESS NOTES
ADULT PROTOCOL: JET AEROSOL  REASSESSMENT    Patient  Ralph Reis     69 y.o.   male     10/9/2021  11:11 AM    Breath Sounds Pre Procedure: Right Breath Sounds: Clear, Diminished                               Left Breath Sounds: Clear, Diminished    Breath Sounds Post Procedure: Right Breath Sounds: Clear, Diminished                                 Left Breath Sounds: Clear, Diminished    Breathing pattern: Pre procedure Breathing Pattern: Regular          Post procedure Breathing Pattern: Regular    Heart Rate: Pre procedure Pulse: 60           Post procedure Pulse: 78    Resp Rate: Pre procedure Respirations: 17           Post procedure Respirations: 16      Oxygen: 3L/NC     Changed:NO    SpO2: Pre procedure SpO2: 95 %                 Post procedure SpO2: 93 %      Nebulizer Therapy: Current medications Aerosolized Medications: Brovana, Pulmicort      Changed:NO      Problem List:   Patient Active Problem List   Diagnosis Code    Atrial flutter ([facility]) I48.92    COPD (chronic obstructive pulmonary disease) ([facility]) J44.9    Dilated cardiomyopathy ([facility]) I42.0    ETOH abuse F10.10    Acute on chronic systolic heart failure ([facility]) I50.23    PAD (peripheral artery disease) ([facility]) I73.9    Diarrhea R19.7    Aspiration pneumonia ([facility]) J69.0    Cellulitis of left hand L03.114    Acute respiratory failure with hypoxia ([facility]) J96.01    CHF (congestive heart failure) ([facility]) I50.9    Tongue cancer ([facility] C02.9    Aphasia R47.01       Respiratory Therapist: Radha Sharma RT

## 2021-10-10 LAB
COPPER SERPL-MCNC: 181 UG/DL (ref 69–132)
ZINC SERPL-MCNC: 72 UG/DL (ref 44–115)

## 2021-10-10 NOTE — DISCHARGE SUMMARY
Hospitalist Discharge Summary     Patient ID:  Rossy Noel  [id number]  57 y.o.  1950  10/4/2021    PCP on record: Raysa Oconnor MD    Admit date: 10/4/2021  Discharge date and time: 10/10/2021    DISCHARGE DIAGNOSIS:    Aspiration Pneumonia POA - RML on CXR & CTA chest ?Malignant mass  With Worsening R Pleural Effusion POA- moderate  COPD  CHF          Hx of tongue cancer s/p resection, XRT and chemo       Garbled/Slurred speech 10/05, not POA  CVA ruled out  Persistent afib POA     Iron deficiency anemia          CKD stage III  EDGAR ruled out       Neuropathy       Colon cancer 2014    CONSULTATIONS:  IP CONSULT TO HEMATOLOGY  IP CONSULT TO NEUROLOGY  IP CONSULT TO PULMONOLOGY    Excerpted HPI from H&P of Sujey Nicolas MD:    Sherri Hernandez is a 69 y.o. male with past medical history significant for atrial fibrillation, tongue cancer, COPD, GERD, CHF EF 20-25% in 2014, HTN  who presents with worsening shortness of breath for the past 3 to 4 days. Patient states that he began to experience shortness of breath when he was walking and exerting himself. He denies any PND orthopnea. He also complains of worsening lower extremity swelling. He saw his nurse practitioner today who sent him to the ED for further evaluation. Patient denies any chest pain. He denies any cough. He states that he has not seen oncology for some time but was supposed to follow-up with them this week for his history of tongue cancer. He currently denies any difficulty swallowing.     ______________________________________________________________________  DISCHARGE SUMMARY/HOSPITAL COURSE:  for full details see H&P, daily progress notes, labs, consult notes. Aspiration Pneumonia POA - RML on CXR & CTA chest ?Malignant mass  With Worsening R Pleural Effusion POA- moderate  COPD  CHF  CTA showed 2. Mild pulmonary edema and increased moderate right pleural effusion.   3. Increased right middle lobe partial atelectasis versus pneumonia. Increased  mediastinal and right hilar lymphadenopathy. Consider underlying right middle  lobe bronchogenic neoplasm.     -Cont oxygen to keep sats>90%, currently on 3 L. Lower than yesterday supplements  -Improving clinically, change Zosyn to Augmentin for aspiration pneumonia  Repeat procalcitonin still low, will change Zosyn to Augmentin for 7 more days  -Appreciate speech recommendation, advance diet, may need MBS in the future  -s/p right thoracentesis, 1.1 L, seems transudative, cytology pending  -Echo showed EF of 55 to 60%, but severe pulmonary hypertension, PASP 81     -Status post IV Bumex, resume home diuretics  -Continue prednisone 40 mg p.o. daily  -Pulmonology input is appreciated  He is back to his previous oxygen requirement        Hx of tongue cancer s/p resection, XRT and chemo  Repeat CT scan showed Findings compatible with local recurrence of known tongue cancer on the right with extension from the right tongue base to the mandibular body with there is direct bony invasion as well as confluent right level 2 cervical chain  Lymphadenopathy  Oncology input is appreciated, he needs to follow-up as an outpatient with ENT for possible recurrent head and neck cancer. Follow-up to be scheduled with oncology and ENT as an outpatient.     Garbled/Slurred speech 10/05, not POA  CVA ruled out  Persistent afib POA- not on full anticoagulation ? reason     C/w coreg, decreased dose today  C/w digoxin, check digoxin level     -Had garbled speech 10/05 which is resolved, CT head and MRI brain without  acute CVA  Cont ASA, plavix, lipitor  Neurology input is appreciated  Stroke orderset triggered     Iron deficiency anemia  -s/p IV iron  Hemodynamically stable  No indication for transfusion at this point  -Iron tablets on dc        CKD stage III  EDGAR ruled out  Cr 1.4 on arrival  Baseline around 1.2  Cr back to baseline     Neuropathy  C/w gabapentin     Colon cancer 2014               _______________________________________________________________________  Patient seen and examined by me on discharge day. Pertinent Findings:  Gen:    Not in distress  Chest: Clear lungs  CVS:   Regular rhythm. No edema  Abd:  Soft, not distended, not tender  Neuro:  Alert,   _______________________________________________________________________  DISCHARGE MEDICATIONS:   Discharge Medication List as of 10/9/2021  1:42 PM      START taking these medications    Details   predniSONE (DELTASONE) 20 mg tablet Take 40 mg by mouth daily (with breakfast) for 7 days. , Normal, Disp-14 Tablet, R-0      amoxicillin-clavulanate (Augmentin) 500-125 mg per tablet Take 1 Tablet by mouth two (2) times a day for 5 days. , Normal, Disp-10 Tablet, R-0      ferrous sulfate (Iron) 325 mg (65 mg iron) tablet Take 1 Tablet by mouth Daily (before breakfast). , Normal, Disp-30 Tablet, R-0         CONTINUE these medications which have CHANGED    Details   carvediloL (COREG) 3.125 mg tablet Take 1 Tablet by mouth two (2) times a day., Normal, Disp-60 Tablet, R-0      furosemide (LASIX) 20 mg tablet Take 1 Tablet by mouth every twelve (12) hours for 30 days. , Normal, Disp-60 Tablet, R-0         CONTINUE these medications which have NOT CHANGED    Details   allopurinoL (ZYLOPRIM) 100 mg tablet Take 1 Tablet by mouth daily. , Normal, Disp-30 Tablet, R-1      betamethasone dipropionate (DIPROSONE) 0.05 % topical cream Apply  to affected area two (2) times daily as needed for Skin Irritation. , Historical Med      tiotropium (SPIRIVA WITH HANDIHALER) 18 mcg inhalation capsule Take 1 Cap by inhalation daily. , Historical Med      fluticasone-salmeterol (ADVAIR DISKUS) 250-50 mcg/dose diskus inhaler Take 1 Puff by inhalation two (2) times a day., Historical Med      gabapentin (NEURONTIN) 300 mg capsule Take 600 mg by mouth two (2) times a day., Historical Med      aspirin delayed-release 81 mg tablet Take 81 mg by mouth daily. , Historical Med      clopidogrel (PLAVIX) 75 mg tab Take 75 mg by mouth daily. , Historical Med      pravastatin (PRAVACHOL) 40 mg tablet Take 40 mg by mouth daily. , Historical Med      digoxin (LANOXIN) 0.125 mg tablet Take 0.125 mg by mouth daily. , Historical Med      folic acid (FOLVITE) 1 mg tablet Take 1 Tab by mouth daily. , Print, Disp-30 Tab, R-11               Patient Follow Up Instructions:    Activity: Activity as tolerated  Diet: Resume previous diet  Wound Care: None needed        Follow-up Information     Follow up With Specialties Details Why Contact Jonathon Otoole NP Nurse Practitioner Go on 10/12/2021 Follow-up appointment at 2:45pm [place] [id number]  [phone]      [place], [address]   [address]  [address] [address]  [phone]          ________________________________________________________________    Risk of deterioration: Low    Condition at Discharge:  Stable  __________________________________________________________________    Disposition  Home with family, no needs    ____________________________________________________________________    Code Status: Full Code  ___________________________________________________________________      Total time in minutes spent coordinating this discharge (includes going over instructions, follow-up, prescriptions, and preparing report for sign off to her PCP) :  >30 minutes    Signed:  Jeny Rod MD

## 2021-10-15 ENCOUNTER — TRANSCRIBE ORDER (OUTPATIENT)
Dept: SCHEDULING | Age: 71
End: 2021-10-15

## 2021-10-15 DIAGNOSIS — D64.9 ANEMIA, UNSPECIFIED: Primary | ICD-10-CM

## 2021-10-20 ENCOUNTER — TRANSCRIBE ORDER (OUTPATIENT)
Dept: SCHEDULING | Age: 71
End: 2021-10-20

## 2021-10-20 DIAGNOSIS — C02.2 MALIGNANT NEOPLASM OF VENTRAL SURFACE OF TONGUE (HCC): Primary | ICD-10-CM

## 2021-10-20 DIAGNOSIS — Z78.9 NONSMOKER: ICD-10-CM

## 2021-10-22 ENCOUNTER — TRANSCRIBE ORDER (OUTPATIENT)
Dept: SCHEDULING | Age: 71
End: 2021-10-22

## 2022-03-19 PROBLEM — I50.9 CHF (CONGESTIVE HEART FAILURE) (HCC): Status: ACTIVE | Noted: 2021-10-04

## 2022-03-19 PROBLEM — L03.114 CELLULITIS OF LEFT HAND: Status: ACTIVE | Noted: 2021-08-19

## 2022-03-19 PROBLEM — R19.7 DIARRHEA: Status: ACTIVE | Noted: 2021-08-19

## 2022-03-20 PROBLEM — C02.9 TONGUE CANCER (HCC): Status: ACTIVE | Noted: 2021-10-04

## 2023-05-12 RX ORDER — ALLOPURINOL 100 MG/1
1 TABLET ORAL DAILY
COMMUNITY
Start: 2021-08-24

## 2023-05-12 RX ORDER — FLUTICASONE PROPIONATE AND SALMETEROL 250; 50 UG/1; UG/1
1 POWDER RESPIRATORY (INHALATION) 2 TIMES DAILY
COMMUNITY

## 2023-05-12 RX ORDER — FERROUS SULFATE 325(65) MG
TABLET ORAL
COMMUNITY
Start: 2021-10-09

## 2023-05-12 RX ORDER — PRAVASTATIN SODIUM 40 MG
40 TABLET ORAL DAILY
COMMUNITY

## 2023-05-12 RX ORDER — BETAMETHASONE DIPROPIONATE 0.5 MG/G
CREAM TOPICAL 2 TIMES DAILY PRN
COMMUNITY

## 2023-05-12 RX ORDER — CLOPIDOGREL BISULFATE 75 MG/1
75 TABLET ORAL DAILY
COMMUNITY

## 2023-05-12 RX ORDER — ASPIRIN 81 MG/1
81 TABLET ORAL DAILY
COMMUNITY

## 2023-05-12 RX ORDER — GABAPENTIN 300 MG/1
CAPSULE ORAL 2 TIMES DAILY
COMMUNITY

## 2023-05-12 RX ORDER — FUROSEMIDE 20 MG/1
TABLET ORAL EVERY 12 HOURS
COMMUNITY
Start: 2021-10-09

## 2023-05-12 RX ORDER — CARVEDILOL 3.12 MG/1
TABLET ORAL 2 TIMES DAILY
COMMUNITY
Start: 2021-10-09

## 2023-05-12 RX ORDER — FOLIC ACID 1 MG/1
TABLET ORAL DAILY
COMMUNITY
Start: 2014-01-24

## 2023-05-12 RX ORDER — DIGOXIN 125 MCG
TABLET ORAL DAILY
COMMUNITY

## (undated) DEVICE — SET ADMIN 16ML TBNG L100IN 2 Y INJ SITE IV PIGGY BK DISP

## (undated) DEVICE — REM POLYHESIVE ADULT PATIENT RETURN ELECTRODE: Brand: VALLEYLAB

## (undated) DEVICE — SYRINGE MED 20ML STD CLR PLAS LUERLOCK TIP N CTRL DISP

## (undated) DEVICE — BAG BELONG PT PERS CLEAR HANDL

## (undated) DEVICE — Z DISCONTINUED USE 2751540 TUBING IRRIG L10IN DISP PMP ENDOGATOR

## (undated) DEVICE — AIRLIFE™ U/CONNECT-IT OXYGEN TUBING 7 FEET (2.1 M) CRUSH-RESISTANT OXYGEN TUBING, VINYL TIPPED: Brand: AIRLIFE™

## (undated) DEVICE — FCPS BX HOT RJ4 2.2MMX240CM -- RADIAL JAW 4 BX/40

## (undated) DEVICE — ENDO CARRY-ON PROCEDURE KIT INCLUDES ENZYMATIC SPONGE, GAUZE, BIOHAZARD LABEL, TRAY, LUBRICANT, DIRTY SCOPE LABEL, WATER LABEL, TRAY, DRAWSTRING PAD, AND DEFENDO 4-PIECE KIT.: Brand: ENDO CARRY-ON PROCEDURE KIT

## (undated) DEVICE — KENDALL RADIOLUCENT FOAM MONITORING ELECTRODE -RECTANGULAR SHAPE: Brand: KENDALL

## (undated) DEVICE — CATH IV AUTOGRD BC BLU 22GA 25 -- INSYTE

## (undated) DEVICE — FORCEPS BX L240CM JAW DIA2.2MM RAD JAW 4 HOT DISP

## (undated) DEVICE — 1200 GUARD II KIT W/5MM TUBE W/O VAC TUBE: Brand: GUARDIAN

## (undated) DEVICE — BW-412T DISP COMBO CLEANING BRUSH: Brand: SINGLE USE COMBINATION CLEANING BRUSH

## (undated) DEVICE — TRAP SURG QUAD PARABOLA SLOT DSGN SFTY SCRN TRAPEASE

## (undated) DEVICE — APC PROBE 2200 C, SINGLE USE OD 2.3MM/6.9FR; L 2.2M/7.2FT: Brand: ERBE

## (undated) DEVICE — Device

## (undated) DEVICE — SOLIDIFIER FLUID 3000 CC ABSORB

## (undated) DEVICE — TUBING HYDR IRR --

## (undated) DEVICE — WORKING LENGTH 235CM, WORKING CHANNEL 2.8MM: Brand: RESOLUTION 360 CLIP

## (undated) DEVICE — CONNECTOR TBNG AUX H2O JET DISP FOR OLY 160/180 SER

## (undated) DEVICE — QUILTED PREMIUM COMFORT UNDERPAD,EXTRA HEAVY: Brand: WINGS

## (undated) DEVICE — SNARE ENDOSCP M L240CM W27MM SHTH DIA2.4MM CHN 2.8MM OVL

## (undated) DEVICE — KIT IV STRT W CHLORAPREP PD 1ML

## (undated) DEVICE — NEEDLE HYPO 18GA L1.5IN PNK S STL HUB POLYPR SHLD REG BVL

## (undated) DEVICE — Z DISCONTINUED NO SUB IDED SET EXTN W/ 4 W STPCOCK M SPIN LOK 36IN